# Patient Record
Sex: MALE | Race: WHITE | Employment: OTHER | URBAN - METROPOLITAN AREA
[De-identification: names, ages, dates, MRNs, and addresses within clinical notes are randomized per-mention and may not be internally consistent; named-entity substitution may affect disease eponyms.]

---

## 2017-01-06 ENCOUNTER — OFFICE VISIT (OUTPATIENT)
Dept: INTERNAL MEDICINE CLINIC | Age: 72
End: 2017-01-06

## 2017-01-06 VITALS
OXYGEN SATURATION: 96 % | WEIGHT: 187.8 LBS | HEIGHT: 70 IN | SYSTOLIC BLOOD PRESSURE: 142 MMHG | TEMPERATURE: 97.9 F | HEART RATE: 87 BPM | BODY MASS INDEX: 26.88 KG/M2 | DIASTOLIC BLOOD PRESSURE: 85 MMHG | RESPIRATION RATE: 16 BRPM

## 2017-01-06 DIAGNOSIS — I87.2 VENOUS INSUFFICIENCY OF BOTH LOWER EXTREMITIES: ICD-10-CM

## 2017-01-06 DIAGNOSIS — R79.89 LOW SERUM TESTOSTERONE LEVEL: ICD-10-CM

## 2017-01-06 DIAGNOSIS — N40.1 BENIGN PROSTATIC HYPERPLASIA WITH LOWER URINARY TRACT SYMPTOMS, UNSPECIFIED MORPHOLOGY: ICD-10-CM

## 2017-01-06 DIAGNOSIS — N52.9 IMPOTENCE OF ORGANIC ORIGIN: ICD-10-CM

## 2017-01-06 DIAGNOSIS — M19.90 OSTEOARTHRITIS, UNSPECIFIED OSTEOARTHRITIS TYPE, UNSPECIFIED SITE: ICD-10-CM

## 2017-01-06 DIAGNOSIS — E78.00 PURE HYPERCHOLESTEROLEMIA: ICD-10-CM

## 2017-01-06 DIAGNOSIS — I10 ESSENTIAL HYPERTENSION: Primary | ICD-10-CM

## 2017-01-06 RX ORDER — SIMVASTATIN 40 MG/1
TABLET, FILM COATED ORAL
Qty: 90 TAB | Refills: 3 | Status: SHIPPED | OUTPATIENT
Start: 2017-01-06 | End: 2017-03-04 | Stop reason: SDUPTHER

## 2017-01-06 RX ORDER — LOSARTAN POTASSIUM 50 MG/1
TABLET ORAL
Qty: 90 TAB | Refills: 3 | Status: SHIPPED | OUTPATIENT
Start: 2017-01-06 | End: 2018-01-08 | Stop reason: SDUPTHER

## 2017-01-06 RX ORDER — DICLOFENAC SODIUM 75 MG/1
TABLET, DELAYED RELEASE ORAL
Qty: 90 TAB | Refills: 3 | Status: SHIPPED | OUTPATIENT
Start: 2017-01-06 | End: 2017-03-09 | Stop reason: SDUPTHER

## 2017-01-06 RX ORDER — TOLTERODINE TARTRATE 2 MG/1
2 TABLET, EXTENDED RELEASE ORAL DAILY
COMMUNITY
End: 2022-01-11 | Stop reason: SDUPTHER

## 2017-01-06 RX ORDER — TADALAFIL 5 MG/1
TABLET ORAL
Qty: 90 TAB | Refills: 3 | Status: SHIPPED | OUTPATIENT
Start: 2017-01-06 | End: 2017-03-09 | Stop reason: SDUPTHER

## 2017-01-06 NOTE — PROGRESS NOTES
Mr Marcos Veras returns to [facility] for an annual physical.  He c/o no pain and appears to be in no distress

## 2017-01-06 NOTE — MR AVS SNAPSHOT
Visit Information Date & Time Provider Department Dept. Phone Encounter #  
 1/6/2017 11:15 AM [facility] [phone] [id number] Follow-up Instructions Return in about 6 months (around 7/6/2017). Upcoming Health Maintenance Date Due Hepatitis C Screening 1945 DTaP/Tdap/Td series (1 - Tdap) 12/14/2011 MEDICARE YEARLY EXAM 7/13/2016 INFLUENZA AGE 9 TO ADULT 8/1/2016 GLAUCOMA SCREENING Q2Y 4/8/2018 COLONOSCOPY 2/15/2023 Allergies as of 1/6/2017  Review Complete On: 1/6/2017 By: Jero Factor, DO Severity Noted Reaction Type Reactions Bactrim [Sulfamethoprim Ds] Medium 08/30/2013   Side Effect Hives, Other (comments) Nausea,sweating,cold sweats Pcn [Penicillins] Medium 12/13/2010   Intolerance Swelling Throat and face Lisinopril  12/21/2015    Angioedema Current Immunizations  Reviewed on 8/11/2015 Name Date Influenza Vaccine 10/20/2014 Pneumococcal Conjugate (PCV-13) 8/12/2015 Pneumococcal Vaccine (Unspecified Type) 12/13/2011 TD Vaccine 12/13/2011 Zoster Vaccine, Live 11/10/2014 Not reviewed this visit You Were Diagnosed With   
  
 Codes Comments Essential hypertension    -  Primary ICD-10-CM: I10 
ICD-9-CM: 401.9 Pure hypercholesterolemia     ICD-10-CM: E78.00 ICD-9-CM: 272.0 Osteoarthritis, unspecified osteoarthritis type, unspecified site     ICD-10-CM: M19.90 ICD-9-CM: 715.90 Benign prostatic hyperplasia with lower urinary tract symptoms, unspecified morphology     ICD-10-CM: N40.1 ICD-9-CM: 600.01 Venous insufficiency of both lower extremities     ICD-10-CM: I87.2 ICD-9-CM: 459.81 Low serum testosterone level     ICD-10-CM: E29.1 ICD-9-CM: 257.2 Impotence of organic origin     ICD-10-CM: N52.9 ICD-9-CM: 607.84 Vitals BP Pulse Temp Resp Height(growth percentile) Weight(growth percentile) 142/85 (BP 1 Location: Left arm, BP Patient Position: Sitting) 87 97.9 °F (36.6 °C) (Oral) 16 5' 10\" (1.778 m) 187 lb 12.8 oz (85.2 kg) SpO2 BMI Smoking Status 96% 26.95 kg/m2 Current Some Day Smoker BMI and BSA Data Body Mass Index Body Surface Area  
 26.95 kg/m 2 2.05 m 2 Preferred Pharmacy Pharmacy Name Phone [address] [facility] [phone] Your Updated Medication List  
  
   
This list is accurate as of: 1/6/17 11:44 AM.  Always use your most recent med list.  
  
  
  
  
 aspirin delayed-release 81 mg tablet Take 81 mg by mouth daily. clindamycin 150 mg capsule Commonly known as:  CLEOCIN Take 600 mg by mouth. Take 600 mg (4 capsules) 1 hour prior to dental appointment DETROL 2 mg tablet Generic drug:  tolterodine Take 2 mg by mouth daily. diclofenac EC 75 mg EC tablet Commonly known as:  VOLTAREN  
TAKE 1 TABLET DAILY losartan 50 mg tablet Commonly known as:  COZAAR  
TAKE 1 TABLET DAILY  
  
 M-VIT PO Take 1 Tab by mouth. Takes one multivitamin po daily. Does not have iron. Omega-3-DHA-EPA-Fish Oil 1,000 mg (120 mg-180 mg) Cap Take 1,200 mg by mouth daily. simvastatin 40 mg tablet Commonly known as:  ZOCOR  
TAKE 1 TABLET NIGHTLY  
  
 tadalafil 5 mg tablet Commonly known as:  CIALIS  
TAKE 1 TABLET AS NEEDED  
  
 vardenafil 20 mg tablet Commonly known as:  LEVITRA Take 20 mg by mouth as needed. Prescriptions Sent to Pharmacy Refills  
 losartan (COZAAR) 50 mg tablet 3 Sig: TAKE 1 TABLET DAILY Class: Normal  
 Pharmacy: [address], [address] Ph #: [phone]  
 tadalafil (CIALIS) 5 mg tablet 3 Sig: TAKE 1 TABLET AS NEEDED Class: Normal  
 Pharmacy: [address], [address] Ph #: [phone] diclofenac EC (VOLTAREN) 75 mg EC tablet 3 Sig: TAKE 1 TABLET DAILY Class: Normal  
 Pharmacy: [address], [address] [place] [address] Ph #: [phone]  
 simvastatin (ZOCOR) 40 mg tablet 3 Sig: TAKE 1 TABLET NIGHTLY Class: Normal  
 Pharmacy: [address], [address] Ph #: [phone] We Performed the Following CBC W/O DIFF [52697 CPT(R)] LIPID PANEL [45381 CPT(R)] METABOLIC PANEL, COMPREHENSIVE [31533 CPT(R)] PSA DIAGNOSTIC (PROSTATIC SPECIFIC AG) [id number] CPT(R)] Follow-up Instructions Return in about 6 months (around 7/6/2017). Introducing [facility]! [facility] introduces Q1Media patient portal. Now you can access parts of your medical record, email your doctor's office, and request medication refills online. 1. In your internet browser, go to [email or web address] 2. Click on the First Time User? Click Here link in the Sign In box. You will see the New Member Sign Up page. 3. Enter your Q1Media Access Code exactly as it appears below. You will not need to use this code after youve completed the sign-up process. If you do not sign up before the expiration date, you must request a new code. · Q1Media Access Code: [email or web address] Expires: 4/6/2017 11:34 AM 
 
4. Enter the last four digits of your Social Security Number (xxxx) and Date of Birth (mm/dd/yyyy) as indicated and click Submit. You will be taken to the next sign-up page. 5. Create a Calxedat ID. This will be your Q1Media login ID and cannot be changed, so think of one that is secure and easy to remember. 6. Create a Calxedat password. You can change your password at any time. 7. Enter your Password Reset Question and Answer. This can be used at a later time if you forget your password. 8. Enter your e-mail address.  You will receive e-mail notification when new information is available in Navera. 9. Click Sign Up. You can now view and download portions of your medical record. 10. Click the Download Summary menu link to download a portable copy of your medical information. If you have questions, please visit the Frequently Asked Questions section of the Navera website. Remember, Navera is NOT to be used for urgent needs. For medical emergencies, dial 911. Now available from your iPhone and Android! Please provide this summary of care documentation to your next provider. Your primary care clinician is listed as Margarita York. If you have any questions after today's visit, please call [phone].

## 2017-01-06 NOTE — PROGRESS NOTES
HISTORY OF PRESENT ILLNESS  Lg Martell is a 70 y.o. male. Pt. comes in for f/u. Has multiple medical problems. Overall has been doing well. Chronic issues with BPH. Urologist added Detrol at [facility] and it is helping. Insurance may not cover Cialis although it has helped. Levitra is not helping his ED much. Testosterone was low but expensive. has chronic arthritic issues in knees but stable. Vision is stable. Followed by eye MD with h/o retinal detachment. Reports compliance with medications and diet. Med list and most recent labs/studies reviewed with pt. All look stable. Active physically to control weight. Due for repeat labs. Needs refills. Preventive care is up to date. Reports no other new c/o. Follow Up Chronic Condition   Pertinent negatives include no chest pain, no abdominal pain, no headaches and no shortness of breath. Hypertension    Pertinent negatives include no chest pain, no blurred vision, no headaches, no dizziness and no shortness of breath. Review of Systems   Constitutional: Negative. Eyes: Negative for blurred vision. Respiratory: Negative for shortness of breath. Cardiovascular: Positive for leg swelling. Negative for chest pain. Gastrointestinal: Negative for abdominal pain. Genitourinary: Positive for urgency. Negative for dysuria. Musculoskeletal: Positive for joint pain. Negative for falls. Skin: Negative. Neurological: Negative for dizziness, sensory change and headaches. Endo/Heme/Allergies: Negative. Psychiatric/Behavioral: Negative. All other systems reviewed and are negative. Physical Exam   Constitutional: He is oriented to person, place, and time. He appears well-developed and well-nourished. No distress. pleasant   HENT:   Head: Normocephalic and atraumatic. Mouth/Throat: Oropharynx is clear and moist.   Eyes: Conjunctivae are normal. No scleral icterus. Neck: Normal range of motion. Neck supple. No JVD present.  No thyromegaly present. Cardiovascular: Normal rate, regular rhythm, normal heart sounds and intact distal pulses. No murmur heard. Pulmonary/Chest: Effort normal and breath sounds normal. No respiratory distress. He has no wheezes. He has no rales. Abdominal: Soft. Bowel sounds are normal. He exhibits no distension. There is no tenderness. Musculoskeletal: He exhibits edema (L pedal with skin changes,chronic, stable). He exhibits no tenderness. djd  L knee surgical scar   Neurological: He is alert and oriented to person, place, and time. No cranial nerve deficit. Coordination normal.   Skin: Skin is warm and dry. No rash noted. Psychiatric: He has a normal mood and affect. His behavior is normal.   Nursing note and vitals reviewed. ASSESSMENT and PLAN  Susi Tavares was seen today for follow up chronic condition and hypertension. Diagnoses and all orders for this visit:    Essential hypertension  -     CBC W/O DIFF  -     LIPID PANEL  -     METABOLIC PANEL, COMPREHENSIVE    Pure hypercholesterolemia    Osteoarthritis, unspecified osteoarthritis type, unspecified site    Benign prostatic hyperplasia with lower urinary tract symptoms, unspecified morphology  -     PSA - PROSTATE SPECIFIC AG    Venous insufficiency of both lower extremities    Low serum testosterone level  -     tadalafil (CIALIS) 5 mg tablet; TAKE 1 TABLET AS NEEDED    Impotence of organic origin  -     tadalafil (CIALIS) 5 mg tablet; TAKE 1 TABLET AS NEEDED    Other orders  -     losartan (COZAAR) 50 mg tablet; TAKE 1 TABLET DAILY  -     diclofenac EC (VOLTAREN) 75 mg EC tablet; TAKE 1 TABLET DAILY  -     simvastatin (ZOCOR) 40 mg tablet; TAKE 1 TABLET NIGHTLY      Follow-up Disposition:  Return in about 6 months (around 7/6/2017).   lab results and schedule of future lab studies reviewed with patient  reviewed diet, exercise and weight control  cardiovascular risk and specific lipid/LDL goals reviewed  reviewed medications and side effects in detail  use of aspirin to prevent MI and TIA's discussed  Overall stable  F/u with other MD's as scheduled

## 2017-01-06 NOTE — LETTER
1/11/2017 9:10 AM 
 
Mr. Nathaly Figueroa [address] [phone] Dear Nathaly Figueroa: 
 
Please find your most recent results below. Resulted Orders PSA DIAGNOSTIC (PROSTATIC SPECIFIC AG) Result Value Ref Range Prostate Specific Ag 1.3 0.0 - 4.0 ng/mL Comment:  
   Roche ECLIA methodology. According to the American Urological Association, Serum PSA should 
decrease and remain at undetectable levels after radical 
prostatectomy. The AUA defines biochemical recurrence as an initial 
PSA value 0.2 ng/mL or greater followed by a subsequent confirmatory PSA value 0.2 ng/mL or greater. Values obtained with different assay methods or kits cannot be used 
interchangeably. Results cannot be interpreted as absolute evidence 
of the presence or absence of malignant disease. Narrative Performed at:  [address]  [id number] : Nicolás Acevedo MD, Phone:  [phone] CBC W/O DIFF Result Value Ref Range WBC 7.4 3.4 - 10.8 x10E3/uL  
 RBC 4.86 4.14 - 5.80 x10E6/uL HGB 14.7 12.6 - 17.7 g/dL HCT 44.0 37.5 - 51.0 % MCV 91 79 - 97 fL  
 MCH 30.2 26.6 - 33.0 pg  
 MCHC 33.4 31.5 - 35.7 g/dL  
 RDW 14.2 12.3 - 15.4 % PLATELET 049 314 - 028 x10E3/uL Narrative Performed at:  [address]  [id number] : Nicolás Acevedo MD, Phone:  [phone] LIPID PANEL Result Value Ref Range Cholesterol, total 174 100 - 199 mg/dL Triglyceride 91 0 - 149 mg/dL HDL Cholesterol 62 >39 mg/dL VLDL, calculated 18 5 - 40 mg/dL LDL, calculated 94 0 - 99 mg/dL Narrative Performed at:  [address]  [id number] : Nicolás Acevedo MD, Phone:  [phone] METABOLIC PANEL, COMPREHENSIVE Result Value Ref Range Glucose 101 (H) 65 - 99 mg/dL BUN 17 8 - 27 mg/dL Creatinine 1.03 0.76 - 1.27 mg/dL GFR est non-AA 73 >59 mL/min/1.73 GFR est AA 84 >59 mL/min/1.73  
 BUN/Creatinine ratio 17 10 - 22 Sodium 144 134 - 144 mmol/L Potassium 4.9 3.5 - 5.2 mmol/L Chloride 104 96 - 106 mmol/L  
 CO2 24 18 - 29 mmol/L Calcium 10.6 (H) 8.6 - 10.2 mg/dL Protein, total 7.1 6.0 - 8.5 g/dL Albumin 4.3 3.5 - 4.8 g/dL GLOBULIN, TOTAL 2.8 1.5 - 4.5 g/dL A-G Ratio 1.5 1.1 - 2.5 Bilirubin, total 0.6 0.0 - 1.2 mg/dL Alk. phosphatase 79 39 - 117 IU/L  
 AST 24 0 - 40 IU/L  
 ALT 24 0 - 44 IU/L Narrative Performed at:  [address]  [id number] : Natividad Soriano MD, Phone:  [phone] CVD REPORT Result Value Ref Range INTERPRETATION Note Comment:  
   Supplement report is available. Narrative Performed at:  [address] [place] [address]  [id number] : Jose Orlando PhD, Phone:  [phone] RECOMMENDATIONS: 
 
Mildly high calcium otherwise stable Stop calcium supplements Increase fluid intake Please call me if you have any questions: [phone] Sincerely, 
 
 
Ryder Blackmon, DO

## 2017-01-07 LAB
ALBUMIN SERPL-MCNC: 4.3 G/DL (ref 3.5–4.8)
ALBUMIN/GLOB SERPL: 1.5 {RATIO} (ref 1.1–2.5)
ALP SERPL-CCNC: 79 IU/L (ref 39–117)
ALT SERPL-CCNC: 24 IU/L (ref 0–44)
AST SERPL-CCNC: 24 IU/L (ref 0–40)
BILIRUB SERPL-MCNC: 0.6 MG/DL (ref 0–1.2)
BUN SERPL-MCNC: 17 MG/DL (ref 8–27)
BUN/CREAT SERPL: 17 (ref 10–22)
CALCIUM SERPL-MCNC: 10.6 MG/DL (ref 8.6–10.2)
CHLORIDE SERPL-SCNC: 104 MMOL/L (ref 96–106)
CHOLEST SERPL-MCNC: 174 MG/DL (ref 100–199)
CO2 SERPL-SCNC: 24 MMOL/L (ref 18–29)
CREAT SERPL-MCNC: 1.03 MG/DL (ref 0.76–1.27)
ERYTHROCYTE [DISTWIDTH] IN BLOOD BY AUTOMATED COUNT: 14.2 % (ref 12.3–15.4)
GLOBULIN SER CALC-MCNC: 2.8 G/DL (ref 1.5–4.5)
GLUCOSE SERPL-MCNC: 101 MG/DL (ref 65–99)
HCT VFR BLD AUTO: 44 % (ref 37.5–51)
HDLC SERPL-MCNC: 62 MG/DL
HGB BLD-MCNC: 14.7 G/DL (ref 12.6–17.7)
INTERPRETATION, 910389: NORMAL
LDLC SERPL CALC-MCNC: 94 MG/DL (ref 0–99)
MCH RBC QN AUTO: 30.2 PG (ref 26.6–33)
MCHC RBC AUTO-ENTMCNC: 33.4 G/DL (ref 31.5–35.7)
MCV RBC AUTO: 91 FL (ref 79–97)
PLATELET # BLD AUTO: 236 X10E3/UL (ref 150–379)
POTASSIUM SERPL-SCNC: 4.9 MMOL/L (ref 3.5–5.2)
PROT SERPL-MCNC: 7.1 G/DL (ref 6–8.5)
PSA SERPL-MCNC: 1.3 NG/ML (ref 0–4)
RBC # BLD AUTO: 4.86 X10E6/UL (ref 4.14–5.8)
SODIUM SERPL-SCNC: 144 MMOL/L (ref 134–144)
TRIGL SERPL-MCNC: 91 MG/DL (ref 0–149)
VLDLC SERPL CALC-MCNC: 18 MG/DL (ref 5–40)
WBC # BLD AUTO: 7.4 X10E3/UL (ref 3.4–10.8)

## 2017-07-07 ENCOUNTER — HOSPITAL ENCOUNTER (OUTPATIENT)
Dept: LAB | Age: 72
Discharge: HOME | End: 2017-07-07
Payer: MEDICARE

## 2017-07-07 ENCOUNTER — OFFICE VISIT (OUTPATIENT)
Dept: INTERNAL MEDICINE CLINIC | Age: 72
End: 2017-07-07

## 2017-07-07 VITALS
HEIGHT: 70 IN | TEMPERATURE: 96.5 F | RESPIRATION RATE: 20 BRPM | BODY MASS INDEX: 26.63 KG/M2 | SYSTOLIC BLOOD PRESSURE: 153 MMHG | HEART RATE: 73 BPM | DIASTOLIC BLOOD PRESSURE: 97 MMHG | OXYGEN SATURATION: 98 % | WEIGHT: 186 LBS

## 2017-07-07 DIAGNOSIS — I10 ESSENTIAL HYPERTENSION: Primary | ICD-10-CM

## 2017-07-07 DIAGNOSIS — Z00.00 MEDICARE ANNUAL WELLNESS VISIT, INITIAL: ICD-10-CM

## 2017-07-07 DIAGNOSIS — L98.9 NON-HEALING SKIN LESION: ICD-10-CM

## 2017-07-07 DIAGNOSIS — E78.00 PURE HYPERCHOLESTEROLEMIA: ICD-10-CM

## 2017-07-07 DIAGNOSIS — N40.1 BENIGN PROSTATIC HYPERPLASIA WITH LOWER URINARY TRACT SYMPTOMS, UNSPECIFIED MORPHOLOGY: ICD-10-CM

## 2017-07-07 DIAGNOSIS — L21.9 SEBORRHEIC DERMATITIS: ICD-10-CM

## 2017-07-07 DIAGNOSIS — I87.2 VENOUS INSUFFICIENCY OF BOTH LOWER EXTREMITIES: ICD-10-CM

## 2017-07-07 DIAGNOSIS — E34.9 HYPOTESTOSTERONISM: ICD-10-CM

## 2017-07-07 PROCEDURE — 80061 LIPID PANEL: CPT

## 2017-07-07 PROCEDURE — 84403 ASSAY OF TOTAL TESTOSTERONE: CPT

## 2017-07-07 PROCEDURE — 84460 ALANINE AMINO (ALT) (SGPT): CPT

## 2017-07-07 PROCEDURE — 36415 COLL VENOUS BLD VENIPUNCTURE: CPT

## 2017-07-07 PROCEDURE — 84450 TRANSFERASE (AST) (SGOT): CPT

## 2017-07-07 PROCEDURE — 80048 BASIC METABOLIC PNL TOTAL CA: CPT

## 2017-07-07 RX ORDER — KETOCONAZOLE 20 MG/G
CREAM TOPICAL
Qty: 30 G | Refills: 2 | Status: SHIPPED | OUTPATIENT
Start: 2017-07-07 | End: 2019-01-11 | Stop reason: ALTCHOICE

## 2017-07-07 NOTE — PATIENT INSTRUCTIONS
Medicare Part B Preventive Services Guidelines/Limitations Date last completed and Frequency Due Date   Bone Mass Measurement  (age 72 & older, biennial) Requires diagnosis related to osteoporosis or estrogen deficiency. Biennial benefit unless patient has history of long-term glucocorticoid tx or baseline is needed because initial test was by other method, or for patients with vertebral abnormalities on x-ray Not Complete:       Recommended every 2 years Due: NOW      As recommended by your PCP or Specialist     Cardiovascular Screening Blood Tests (every 5 years)  Total cholesterol, HDL, Triglycerides Order as a panel if possible Completed:   1-6-17    As recommended by your PCP Scheduled to have labs done today 7-7-17      As recommended by your PCP or Specialist   Hepatitis B vaccines  (covered for patients at high risk- hemophilia, ESRD, DM, body fluid contact   Three shot series covered once     May have been done in the 70's N/A   Zoster Shingles vaccine Covered by Medicare Part D through the pharmacy- PCP provides prescription Completed:   11-10-14    Recommended once over age 48  This is a one time vaccine    Pneumococcal vaccine (once after age 72)      __________________  Anmol Carlos 15           ___________________ Completed:   12-13-11    Recommended once over the age of 72  __________________  Completed:  8-12-15    Recommended once over the age of 72 This is a one time vaccine        ________________    This is a one time vaccine       Colorectal Cancer Screening  -Fecal occult blood test (annual)  -Flexible sigmoidoscopy (5y)  -Screening colonoscopy (10y)  -Barium Enema Age 49-80;  After age [de-identified] if history of abnormal results Completed:    2-15-13    Recommended every 5 to 10 years  Due: 2023      As recommended by your PCP or Specialist     Counseling to Prevent Tobacco Use (up to 8 sessions per year)  - Counseling greater than 3 and up to 10 minutes  - Counseling greater than 10 minutes   Patients must be asymptomatic of tobacco-related conditions to receive as preventive service N/A N/A   Diabetes Screening Tests (at least every 3 years, Medicare covers annually or at 6-month intervals for prediabetic patients)    Fasting blood sugar (FBS) or glucose tolerance test (GTT) Patient must be diagnosed with one of the following:  -Hypertension, Dyslipidemia, obesity, previous impaired FBS or GTT  Or any two of the following: overweight, FH of diabetes, age ? 72, history of gestational diabetes, birth of baby weighing more than 9 pounds Not diabetic      Recommended every 3 years for non-diabetics      Recommended every 3-6 months for Pre-Diabetics and Diabetics       As recommended by your PCP or Specialist     Lung Cancer Screening-with low dose CT for patients who meet all criteria:  -Age 50-69  -either a current smoker or have quit in the past 15 yrs  -have a smoking history of 30 pack years or more  -have a written order from MD for screening   Covered once every 12 months  N/A N/A   Glaucoma Screening (no USPSTF recommendation) Covered for high risk patients such as patients with Diabetes mellitus, family history of glaucoma, , age 48 or over,  American, age 72 or over Completed:   4-8-16  Recommended annually Due: NOW  Please schedule for follow up   Seasonal Influenza Vaccination (annually)  Completed:  11-11-16     Recommended Annually Due: Fall of 2017   TDAP Vaccination Covered by Medicare part D through the pharmacy- PCP provides prescription Completed:   at [facility] pharmacy     Recommended every 10 years Due:   Records to be requested   HIV Screening (includes patients at high risk and includes any patient that requests the test and pregnant women   Covered once every 12 months or up to 3 times during pregnancy N/A N/A   Hepatitis C screening tests Indicated for patients with at least one of the following: current or past history of IV drug use, those who had blood transfusion before 12, those born between [facility]. Decline   Declined     Please contact your RN/Nurse Navigator with any questions about your visit or instructions today. Thank you for the opportunity for us to participate in your care.

## 2017-07-07 NOTE — LETTER
7/11/2017 11:18 AM 
 
Mr. Hayley Cantrell [address] [phone] Dear Hayley Cantrell: 
 
Please find your most recent results below. Resulted Orders ALT Result Value Ref Range ALT (SGPT) 34 0 - 44 IU/L Narrative Performed at:  [address]  [id number] : Mckenna Fuller MD, Phone:  [phone] AST Result Value Ref Range AST (SGOT) 26 0 - 40 IU/L Narrative Performed at:  [address]  [id number] : Mckenna Fuller MD, Phone:  [phone] LIPID PANEL Result Value Ref Range Cholesterol, total 168 100 - 199 mg/dL Triglyceride 92 0 - 149 mg/dL HDL Cholesterol 61 >39 mg/dL VLDL, calculated 18 5 - 40 mg/dL LDL, calculated 89 0 - 99 mg/dL Narrative Performed at:  [address]  [id number] : Mckenna Fuller MD, Phone:  [phone] METABOLIC PANEL, BASIC Result Value Ref Range Glucose 103 (H) 65 - 99 mg/dL BUN 22 8 - 27 mg/dL Creatinine 0.84 0.76 - 1.27 mg/dL GFR est non-AA 88 >59 mL/min/1.73 GFR est  >59 mL/min/1.73  
 BUN/Creatinine ratio 26 (H) 10 - 24 Sodium 143 134 - 144 mmol/L Potassium 4.6 3.5 - 5.2 mmol/L Chloride 102 96 - 106 mmol/L  
 CO2 26 18 - 29 mmol/L Calcium 10.3 (H) 8.6 - 10.2 mg/dL Narrative Performed at:  [address]  [id number] : Mckenna Fuller MD, Phone:  [phone] TESTOSTERONE, FREE & TOTAL Result Value Ref Range Testosterone 396 348 - 1197 ng/dL Comment: **Effective July 17, 2017 the reference interval for** Testosterone, Serum Males >22 years old will be 
  changing to: Adult Males:      [phone] COMMENT Comment Comment:  
   Adult male reference interval is based on a population of lean males up to 36years old. Free testosterone (Direct) 7.8 6.6 - 18.1 pg/mL Narrative Performed at:  [address]  [id number] : Fabi Barrios MD, Phone:  [phone] CVD REPORT Result Value Ref Range INTERPRETATION Note Comment:  
   Supplement report is available. Narrative Performed at:  [address] [place] [address]  [id number] : Missy Cordoba PhD, Phone:  [phone] RECOMMENDATIONS: 
All labs are stable Please call me if you have any questions: [phone] Sincerely, 
 
 
Abigail Lunch, DO

## 2017-07-07 NOTE — PROGRESS NOTES
Medicare Wellness Visit      Boyd Mackenzie is a 70 y.o. male and presents for Annual Medicare Wellness Visit. Assessment of cognitive impairment: Alert and oriented x 3. Depression Screen:   PHQ over the last two weeks 7/7/2017   Little interest or pleasure in doing things Not at all   Feeling down, depressed or hopeless Not at all   Total Score PHQ 2 0       Fall Risk Assessment:    Fall Risk Assessment, last 12 mths 7/7/2017   Able to walk? Yes   Fall in past 12 months? No       Abuse Screen:   Abuse Screening Questionnaire 7/7/2017   Do you ever feel afraid of your partner? N   Are you in a relationship with someone who physically or mentally threatens you? N   Is it safe for you to go home? Y       Activities of Daily Living:  Self-care. Requires assistance with: no ADLs  Patient handle his/her own medications  yes Use of pill box  no  Activities of Daily Living:   ADL Assessment 7/7/2017   Feeding yourself No Help Needed   Getting from bed to chair No Help Needed   Getting dressed No Help Needed   Bathing or showering No Help Needed   Walk across the room (includes cane/walker) No Help Needed   Using the telphone No Help Needed   Taking your medications No Help Needed   Preparing meals No Help Needed   Managing money (expenses/bills) No Help Needed   Moderately strenuous housework (laundry) No Help Needed   Shopping for personal items (toiletries/medicines) No Help Needed   Shopping for groceries No Help Needed   Driving No Help Needed   Climbing a flight of stairs No Help Needed   Getting to places beyond walking distances No Help Needed       Health Maintenance:  Daily Aspirin: yes   Bone Density: has not had this but will consider for future. Information provided  Glaucoma Screening: Patient has yearly exams. Last one on 4-8-16. Information provided r/t keeping follow up appointments. Patient will call to schedule  Immunizations:    Tetanus: up to date per patient report.  [facility]  Influenza: up to date. Shingles: up to date. PPSV-23: up to date. Prevnar-13: up to date. Cancer screening:    Colon: up to date. Prostate:  up to date    Alcohol Risk Screen:   On any occasion during the past 3 months, have you had more than 3 drinks(female) or 4 drinks (male) containing alcohol in one? No  Do you average more than 7 drinks (female) or 14 drinks (male) per week? No  Type and amount: drinks occasional glass of wine    Hearing Loss:  denies any hearing loss, wears hearing aides    Vision Loss:   Wears glasses, contact lenses, or have any other visual impairment  no    Adult Nutrition Screen:  No risk factors noted. Advance Care Planning:   End of Life Planning: has an advanced directive - a copy has been provided, has NO advanced directive  - add't info provided, reviewed DNR/DNI and patient is not interested      Medications/Allergies: Reviewed with patient  Prior to Admission medications    Medication Sig Start Date End Date Taking? Authorizing Provider   simvastatin (ZOCOR) 40 mg tablet TAKE 1 TABLET NIGHTLY 3/9/17  Yes Jaron Haney DO   CIALIS 5 mg tablet TAKE 1 TABLET AS NEEDED 3/9/17  Yes Jaron Haney DO   diclofenac EC (VOLTAREN) 75 mg EC tablet TAKE 1 TABLET DAILY 3/9/17  Yes Jaron Haney DO   tolterodine (DETROL) 2 mg tablet Take 2 mg by mouth daily. Yes Historical Provider   losartan (COZAAR) 50 mg tablet TAKE 1 TABLET DAILY 1/6/17  Yes Jaron Haney DO   aspirin delayed-release 81 mg tablet Take 81 mg by mouth daily. Yes Historical Provider   Omega-3-DHA-EPA-Fish Oil 1,000 (120-180) mg Cap Take 1,200 mg by mouth daily. Yes Historical Provider   PV W-O MILLICENT/FERROUS FUMARATE/FA (M-VIT PO) Take 1 Tab by mouth. Takes one multivitamin po daily. Does not have iron. Yes Historical Provider   vardenafil (LEVITRA) 20 mg tablet Take 20 mg by mouth as needed.  7/17/15   Peyton Owen, DO       Allergies   Allergen Reactions    Bactrim [Sulfamethoprim Ds] Hives and Other (comments)     Nausea,sweating,cold sweats    Pcn [Penicillins] Swelling     Throat and face    Lisinopril Angioedema       Objective:  Visit Vitals    BP (!) 153/97 (BP 1 Location: Right arm, BP Patient Position: Sitting)    Pulse 73    Temp 96.5 °F (35.8 °C) (Oral)    Resp 20    Ht 5' 10\" (1.778 m)    Wt 186 lb (84.4 kg)    SpO2 98%    BMI 26.69 kg/m2    Body mass index is 26.69 kg/(m^2). Problem List: Reviewed with patient and discussed risk factors. 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 3XXA    Seborrheic dermatitis L21.9       PSH: Reviewed with patient  Past Surgical History:   Procedure Laterality Date    HX CATARACT REMOVAL      bilateral then bilateral laser surgery to open back of eye to let more light in    HX HERNIA REPAIR      inguinal hernia repair    HX ORTHOPAEDIC      8 left knee surgeries - arthroscopies/ACL repair/knee replacement    HX RETINAL DETACHMENT REPAIR      2 right eye/1 left eye to repair retina and macula    HX TONSILLECTOMY      HX VASECTOMY          SH: Reviewed with patient  Social History   Substance Use Topics    Smoking status: Current Some Day Smoker     Types: Cigars    Smokeless tobacco: Never Used      Comment: occasional cigar    Alcohol use 0.5 oz/week     1 Glasses of wine per week       FH: Reviewed with patient  Family History   Problem Relation Age of Onset    Lung Disease Mother        Current medical providers:    Patient Care Team:  Sarwat Dunlap DO as PCP - Yasmin Benitse MD (Ophthalmology)  Yaz Clement MD (Gastroenterology)  Julienne Bamberger, RN as Ambulatory Care Navigator (Internal Medicine)    Plan:      Orders Placed This Encounter    ALT    AST    LIPID PANEL    METABOLIC PANEL, BASIC    TESTOSTERONE, FREE & TOTAL    [facility] Maintenance   Topic Date Due    Hepatitis C Screening  1945    DTaP/Tdap/Td series (1 - Tdap) 12/14/2011    INFLUENZA AGE 9 TO ADULT  08/01/2017    GLAUCOMA SCREENING Q2Y  04/08/2018    MEDICARE YEARLY EXAM  07/08/2018    COLONOSCOPY  02/15/2023    ZOSTER VACCINE AGE 60>  Completed    Pneumococcal 65+ Low/Medium Risk  Completed         Urinary/ Fecal Incontinence: No    Regular physical exercise: Yes patient plays golf regularly    Patient verbalized understanding of information presented. AVS and Medicare Part B Preventive Services Table printed, given to pt and reviewed. See table for findings under Recommendation and Scheduled. All of the patient's questions were answered.

## 2017-07-07 NOTE — MR AVS SNAPSHOT
Visit Information Date & Time Provider Department Dept. Phone Encounter #  
 7/7/2017 10:15 AM Elsa Maya [facility] [phone] [id number] Follow-up Instructions Return in about 6 months (around 1/7/2018). Upcoming Health Maintenance Date Due Hepatitis C Screening 1945 DTaP/Tdap/Td series (1 - Tdap) 12/14/2011 MEDICARE YEARLY EXAM 7/13/2016 INFLUENZA AGE 9 TO ADULT 8/1/2017 GLAUCOMA SCREENING Q2Y 4/8/2018 COLONOSCOPY 2/15/2023 Allergies as of 7/7/2017  Review Complete On: 7/7/2017 By: Jordan Jovel,  Severity Noted Reaction Type Reactions Bactrim [Sulfamethoprim Ds] Medium 08/30/2013   Side Effect Hives, Other (comments) Nausea,sweating,cold sweats Pcn [Penicillins] Medium 12/13/2010   Intolerance Swelling Throat and face Lisinopril  12/21/2015    Angioedema Current Immunizations  Reviewed on 8/11/2015 Name Date Influenza Vaccine 10/20/2014 Pneumococcal Conjugate (PCV-13) 8/12/2015 Pneumococcal Vaccine (Unspecified Type) 12/13/2011 TD Vaccine 12/13/2011 Zoster Vaccine, Live 11/10/2014 Not reviewed this visit You Were Diagnosed With   
  
 Codes Comments Essential hypertension    -  Primary ICD-10-CM: I10 
ICD-9-CM: 401.9 Pure hypercholesterolemia     ICD-10-CM: E78.00 ICD-9-CM: 272.0 Venous insufficiency of both lower extremities     ICD-10-CM: I87.2 ICD-9-CM: 459.81 Benign prostatic hyperplasia with lower urinary tract symptoms, unspecified morphology     ICD-10-CM: N40.1 ICD-9-CM: 600.01 Non-healing skin lesion     ICD-10-CM: L98.9 ICD-9-CM: 709.9 Seborrheic dermatitis     ICD-10-CM: L21.9 ICD-9-CM: 690.10 Hypotestosteronism     ICD-10-CM: E29.1 ICD-9-CM: 257.2 Vitals BP Pulse Temp Resp Height(growth percentile) Weight(growth percentile)  (!) 153/97 (BP 1 Location: Right arm, BP Patient Position: Sitting) 73 96.5 °F (35.8 °C) (Oral) 20 5' 10\" (1.778 m) 186 lb (84.4 kg) SpO2 BMI Smoking Status 98% 26.69 kg/m2 Current Some Day Smoker Vitals History BMI and BSA Data Body Mass Index Body Surface Area  
 26.69 kg/m 2 2.04 m 2 Preferred Pharmacy Pharmacy Name Phone [facility] [phone] Your Updated Medication List  
  
   
This list is accurate as of: 7/7/17 10:27 AM.  Always use your most recent med list.  
  
  
  
  
 aspirin delayed-release 81 mg tablet Take 81 mg by mouth daily. CIALIS 5 mg tablet Generic drug:  tadalafil TAKE 1 TABLET AS NEEDED DETROL 2 mg tablet Generic drug:  tolterodine Take 2 mg by mouth daily. diclofenac EC 75 mg EC tablet Commonly known as:  VOLTAREN  
TAKE 1 TABLET DAILY losartan 50 mg tablet Commonly known as:  COZAAR  
TAKE 1 TABLET DAILY  
  
 M-VIT PO Take 1 Tab by mouth. Takes one multivitamin po daily. Does not have iron. Omega-3-DHA-EPA-Fish Oil 1,000 mg (120 mg-180 mg) Cap Take 1,200 mg by mouth daily. simvastatin 40 mg tablet Commonly known as:  ZOCOR  
TAKE 1 TABLET NIGHTLY  
  
 vardenafil 20 mg tablet Commonly known as:  LEVITRA Take 20 mg by mouth as needed. We Performed the Following ALT [id number] CPT(R)] AST L3215073 CPT(R)] LIPID PANEL [09243 CPT(R)] METABOLIC PANEL, BASIC [99461 CPT(R)] REFERRAL TO DERMATOLOGY [REF19 Custom] Comments:  
 Please evaluate patient for non-healing skin lesion. TESTOSTERONE, FREE & TOTAL [22629 CPT(R)] Follow-up Instructions Return in about 6 months (around 1/7/2018). Referral Information Referral ID Referred By Referred To  
  
 [id number] MD Bella Guevara  [address] [place], [facility] Phone: [phone] Fax: [phone] Visits Status Start Date End Date 1 New Request 7/7/17 7/7/18 If your referral has a status of pending review or denied, additional information will be sent to support the outcome of this decision. Patient Instructions Medicare Part B Preventive Services Guidelines/Limitations Date last completed and Frequency Due Date Bone Mass Measurement 
(age 72 & older, biennial) Requires diagnosis related to osteoporosis or estrogen deficiency. Biennial benefit unless patient has history of long-term glucocorticoid tx or baseline is needed because initial test was by other method, or for patients with vertebral abnormalities on x-ray Not Complete:  
 
 
Recommended every 2 years Due: NOW As recommended by your PCP or Specialist 
  
Cardiovascular Screening Blood Tests (every 5 years) Total cholesterol, HDL, Triglycerides Order as a panel if possible Completed:  
1-6-17 As recommended by your PCP Scheduled to have labs done today 7-7-17 As recommended by your PCP or Specialist  
Hepatitis B vaccines (covered for patients at high risk- hemophilia, ESRD, DM, body fluid contact Three shot series covered once May have been done in the 70's N/A Zoster Shingles vaccine Covered by Medicare Part D through the pharmacy- PCP provides prescription Completed:  
11-10-14 Recommended once over age 48  This is a one time vaccine Pneumococcal vaccine (once after age 72) 
 
 
__________________ Prevnar 13  
 
 
 
 
___________________ Completed:  
30-44-33 Recommended once over the age of 72 
__________________ Completed: 
8-12-15 Recommended once over the age of 72 This is a one time vaccine 
 
 
 
________________ This is a one time vaccine Colorectal Cancer Screening 
-Fecal occult blood test (annual) -Flexible sigmoidoscopy (5y) 
-Screening colonoscopy (10y) -Barium Enema Age 49-80; After age [de-identified] if history of abnormal results Completed:  
 2-15-13 Recommended every 5 to 10 years  Due: 2023 As recommended by your PCP or Specialist 
  
Counseling to Prevent Tobacco Use (up to 8 sessions per year) - Counseling greater than 3 and up to 10 minutes - Counseling greater than 10 minutes Patients must be asymptomatic of tobacco-related conditions to receive as preventive service N/A N/A Diabetes Screening Tests (at least every 3 years, Medicare covers annually or at 6-month intervals for prediabetic patients) Fasting blood sugar (FBS) or glucose tolerance test (GTT) Patient must be diagnosed with one of the following: 
-Hypertension, Dyslipidemia, obesity, previous impaired FBS or GTT 
Or any two of the following: overweight, FH of diabetes, age ? 72, history of gestational diabetes, birth of baby weighing more than 9 pounds Not diabetic Recommended every 3 years for non-diabetics Recommended every 3-6 months for Pre-Diabetics and Diabetics As recommended by your PCP or Specialist 
  
Lung Cancer Screening-with low dose CT for patients who meet all criteria: 
-Age 50-69 
-either a current smoker or have quit in the past 15 yrs 
-have a smoking history of 30 pack years or more 
-have a written order from MD for screening Covered once every 12 months  N/A N/A Glaucoma Screening (no USPSTF recommendation) Covered for high risk patients such as patients with Diabetes mellitus, family history of glaucoma, , age 48 or over,  American, age 72 or over Completed:  
4-8-16 Recommended annually Due: NOW Please schedule for follow up Seasonal Influenza Vaccination (annually)  Completed: 
11-11-16 Recommended Annually Due: Fall of 2017 TDAP Vaccination Covered by Medicare part D through the pharmacy- PCP provides prescription Completed: 
 at [facility] pharmacy Recommended every 10 years Due:  
Records to be requested HIV Screening (includes patients at high risk and includes any patient that requests the test and pregnant women Covered once every 12 months or up to 3 times during pregnancy N/A N/A Hepatitis C screening tests Indicated for patients with at least one of the following: current or past history of IV drug use, those who had blood transfusion before 1992, those born between [facility]. Decline Declined Please contact your RN/Nurse Navigator with any questions about your visit or instructions today. Thank you for the opportunity for us to participate in your care. Introducing [facility]! [employer] introduces [facility] patient portal. Now you can access parts of your medical record, email your doctor's office, and request medication refills online. 1. In your internet browser, go to [email or web address] 2. Click on the First Time User? Click Here link in the Sign In box. You will see the New Member Sign Up page. 3. Enter your [facility] Access Code exactly as it appears below. You will not need to use this code after youve completed the sign-up process. If you do not sign up before the expiration date, you must request a new code. · [facility] Access Code: [email or web address] Expires: 10/5/2017 10:03 AM 
 
4. Enter the last four digits of your Social Security Number (xxxx) and Date of Birth (mm/dd/yyyy) as indicated and click Submit. You will be taken to the next sign-up page. 5. Create a [facility] ID. This will be your [facility] login ID and cannot be changed, so think of one that is secure and easy to remember. 6. Create a [facility] password. You can change your password at any time. 7. Enter your Password Reset Question and Answer. This can be used at a later time if you forget your password. 8. Enter your e-mail address. You will receive e-mail notification when new information is available in [address] 9. Click Sign Up. You can now view and download portions of your medical record. 10. Click the Download Summary menu link to download a portable copy of your medical information. If you have questions, please visit the Frequently Asked Questions section of the [employer] website. Remember, [employer] is NOT to be used for urgent needs. For medical emergencies, dial 911. Now available from your iPhone and Android! Please provide this summary of care documentation to your next provider. Your primary care clinician is listed as Koko Tracey. If you have any questions after today's visit, please call [phone].

## 2017-07-07 NOTE — PROGRESS NOTES
HISTORY OF PRESENT ILLNESS  Rob Pereyra is a 70 y.o. male. Pt. comes in for f/u. Has multiple medical problems. Has chronic issues with BPH and ED. Followed by urologist. Ellie Chapa on Detroland Cialis. Testosterone was low but expensive. He has chronic arthritic issues in knees but stable. Vision is stable. Followed by eye MD with h/o retinal detachment. Reports a spot on his forehead that is not healing. Also having redness and flaking on his face. He is out in the sun but using sunscreen and wearing a hat. Reports compliance with medications and diet. Med list and most recent labs/studies reviewed with pt. All look stable. Active physically to control weight. Due for repeat labs. Preventive care is up to date. Reports no other new c/o. Hypertension    Pertinent negatives include no chest pain, no blurred vision, no headaches, no dizziness and no shortness of breath. Cholesterol Problem   Pertinent negatives include no chest pain, no abdominal pain, no headaches and no shortness of breath. Review of Systems   Constitutional: Negative. Eyes: Negative for blurred vision. Respiratory: Negative for shortness of breath. Cardiovascular: Positive for leg swelling. Negative for chest pain. Gastrointestinal: Negative for abdominal pain. Genitourinary: Positive for urgency. Negative for dysuria. ED   Musculoskeletal: Positive for joint pain. Negative for falls. Skin: Negative. Neurological: Negative for dizziness, sensory change and headaches. Endo/Heme/Allergies: Negative. Psychiatric/Behavioral: Negative. All other systems reviewed and are negative. Physical Exam   Constitutional: He is oriented to person, place, and time. He appears well-developed and well-nourished. No distress. pleasant   HENT:   Head: Normocephalic and atraumatic. Mouth/Throat: Oropharynx is clear and moist.   Eyes: Conjunctivae are normal. No scleral icterus. Neck: Normal range of motion. Neck supple. No JVD present. No thyromegaly present. Cardiovascular: Normal rate, regular rhythm, normal heart sounds and intact distal pulses. No murmur heard. Pulmonary/Chest: Effort normal and breath sounds normal. No respiratory distress. He has no wheezes. He has no rales. Abdominal: Soft. Bowel sounds are normal. He exhibits no distension. There is no tenderness. Musculoskeletal: He exhibits edema (L pedal with skin changes,chronic, stable). He exhibits no tenderness. djd  L knee surgical scar   Neurological: He is alert and oriented to person, place, and time. No cranial nerve deficit. Coordination normal.   Skin: Skin is warm and dry. No rash noted. Psychiatric: He has a normal mood and affect. His behavior is normal.   Nursing note and vitals reviewed. ASSESSMENT and PLAN  Joan Nobles was seen today for hypertension, cholesterol problem, benign prostatic hypertrophy and annual wellness visit. Diagnoses and all orders for this visit:    Essential hypertension  -     METABOLIC PANEL, BASIC    Pure hypercholesterolemia  -     ALT  -     AST  -     LIPID PANEL  -     METABOLIC PANEL, BASIC    Venous insufficiency of both lower extremities    Benign prostatic hyperplasia with lower urinary tract symptoms, unspecified morphology    Non-healing skin lesion  -     REFERRAL TO DERMATOLOGY    Seborrheic dermatitis    Hypotestosteronism  -     TESTOSTERONE, FREE & TOTAL    Medicare annual wellness visit, initial    Other orders  -     ketoconazole (NIZORAL) 2 % topical cream; Apply to affected areas on face daily      Follow-up Disposition:  Return in about 6 months (around 1/7/2018).    lab results and schedule of future lab studies reviewed with patient  reviewed diet, exercise and weight control  reviewed medications and side effects in detail  Overall stable  F/u with other MD's as scheduled  I have reviewed and agree with Medicare Annual Wellness visit done by Addie Garcias

## 2017-07-09 LAB
ALT SERPL-CCNC: 34 IU/L (ref 0–44)
AST SERPL-CCNC: 26 IU/L (ref 0–40)
BUN SERPL-MCNC: 22 MG/DL (ref 8–27)
BUN/CREAT SERPL: 26 (ref 10–24)
CALCIUM SERPL-MCNC: 10.3 MG/DL (ref 8.6–10.2)
CHLORIDE SERPL-SCNC: 102 MMOL/L (ref 96–106)
CHOLEST SERPL-MCNC: 168 MG/DL (ref 100–199)
CO2 SERPL-SCNC: 26 MMOL/L (ref 18–29)
COMMENT: NORMAL
CREAT SERPL-MCNC: 0.84 MG/DL (ref 0.76–1.27)
GLUCOSE SERPL-MCNC: 103 MG/DL (ref 65–99)
HDLC SERPL-MCNC: 61 MG/DL
INTERPRETATION, 910389: NORMAL
LDLC SERPL CALC-MCNC: 89 MG/DL (ref 0–99)
POTASSIUM SERPL-SCNC: 4.6 MMOL/L (ref 3.5–5.2)
SODIUM SERPL-SCNC: 143 MMOL/L (ref 134–144)
TESTOST FREE SERPL-MCNC: 7.8 PG/ML (ref 6.6–18.1)
TESTOST SERPL-MCNC: 396 NG/DL (ref 348–1197)
TRIGL SERPL-MCNC: 92 MG/DL (ref 0–149)
VLDLC SERPL CALC-MCNC: 18 MG/DL (ref 5–40)

## 2018-01-08 ENCOUNTER — OFFICE VISIT (OUTPATIENT)
Dept: INTERNAL MEDICINE CLINIC | Age: 73
End: 2018-01-08

## 2018-01-08 ENCOUNTER — HOSPITAL ENCOUNTER (OUTPATIENT)
Dept: LAB | Age: 73
Discharge: HOME | End: 2018-01-08
Payer: MEDICARE

## 2018-01-08 VITALS
OXYGEN SATURATION: 96 % | HEART RATE: 80 BPM | TEMPERATURE: 96.3 F | SYSTOLIC BLOOD PRESSURE: 125 MMHG | WEIGHT: 189 LBS | BODY MASS INDEX: 27.06 KG/M2 | HEIGHT: 70 IN | RESPIRATION RATE: 20 BRPM | DIASTOLIC BLOOD PRESSURE: 88 MMHG

## 2018-01-08 DIAGNOSIS — N40.1 BENIGN PROSTATIC HYPERPLASIA WITH INCOMPLETE BLADDER EMPTYING: ICD-10-CM

## 2018-01-08 DIAGNOSIS — E78.00 PURE HYPERCHOLESTEROLEMIA: ICD-10-CM

## 2018-01-08 DIAGNOSIS — I10 ESSENTIAL HYPERTENSION: Primary | ICD-10-CM

## 2018-01-08 DIAGNOSIS — R79.89 LOW SERUM TESTOSTERONE LEVEL: ICD-10-CM

## 2018-01-08 DIAGNOSIS — R39.14 BENIGN PROSTATIC HYPERPLASIA WITH INCOMPLETE BLADDER EMPTYING: ICD-10-CM

## 2018-01-08 DIAGNOSIS — M19.90 OSTEOARTHRITIS, UNSPECIFIED OSTEOARTHRITIS TYPE, UNSPECIFIED SITE: ICD-10-CM

## 2018-01-08 PROCEDURE — 80053 COMPREHEN METABOLIC PANEL: CPT

## 2018-01-08 PROCEDURE — 80061 LIPID PANEL: CPT

## 2018-01-08 RX ORDER — SIMVASTATIN 40 MG/1
TABLET, FILM COATED ORAL
Qty: 90 TAB | Refills: 3 | Status: SHIPPED | OUTPATIENT
Start: 2018-01-08 | End: 2019-01-11 | Stop reason: SDUPTHER

## 2018-01-08 RX ORDER — DOXYCYCLINE 100 MG/1
CAPSULE ORAL
COMMUNITY
Start: 2017-11-06 | End: 2019-07-12 | Stop reason: ALTCHOICE

## 2018-01-08 RX ORDER — LOSARTAN POTASSIUM 50 MG/1
TABLET ORAL
Qty: 90 TAB | Refills: 3 | Status: SHIPPED | OUTPATIENT
Start: 2018-01-08 | End: 2019-01-11 | Stop reason: SDUPTHER

## 2018-01-08 RX ORDER — TADALAFIL 5 MG/1
TABLET ORAL
Qty: 90 TAB | Refills: 3 | Status: SHIPPED | OUTPATIENT
Start: 2018-01-08 | End: 2019-01-11 | Stop reason: ALTCHOICE

## 2018-01-08 RX ORDER — DICLOFENAC SODIUM 75 MG/1
TABLET, DELAYED RELEASE ORAL
Qty: 90 TAB | Refills: 3 | Status: SHIPPED | OUTPATIENT
Start: 2018-01-08 | End: 2019-01-11 | Stop reason: SDUPTHER

## 2018-01-08 RX ORDER — ALFUZOSIN HYDROCHLORIDE 10 MG/1
10 TABLET, EXTENDED RELEASE ORAL DAILY
COMMUNITY
Start: 2017-11-18 | End: 2019-01-11 | Stop reason: ALTCHOICE

## 2018-01-08 NOTE — PROGRESS NOTES
Chief Complaint   Patient presents with    Hypertension    Cholesterol Problem    Medication Problem     needs Dx-Cialis is for prostrate     1. Have you been to the ER, urgent care clinic since your last visit? Hospitalized since your last visit? Urgent Care--sinus infection-several mo ago. 2. Have you seen or consulted any other health care providers outside of the [address] [place] [address] since your last visit? Include any pap smears or colon screening.  No

## 2018-01-08 NOTE — LETTER
1/10/2018 3:43 PM 
 
Mr. Mat Tello [address] [id number] Dear Mat Tello: 
 
Please find your most recent results below. Resulted Orders METABOLIC PANEL, COMPREHENSIVE Result Value Ref Range Glucose 102 (H) 65 - 99 mg/dL BUN 22 8 - 27 mg/dL Creatinine 0.87 0.76 - 1.27 mg/dL GFR est non-AA 86 >59 mL/min/1.73 GFR est  >59 mL/min/1.73  
 BUN/Creatinine ratio 25 (H) 10 - 24 Sodium 144 134 - 144 mmol/L Potassium 5.1 3.5 - 5.2 mmol/L Chloride 105 96 - 106 mmol/L  
 CO2 25 18 - 29 mmol/L Calcium 9.9 8.6 - 10.2 mg/dL Protein, total 6.5 6.0 - 8.5 g/dL Albumin 4.2 3.5 - 4.8 g/dL GLOBULIN, TOTAL 2.3 1.5 - 4.5 g/dL A-G Ratio 1.8 1.2 - 2.2 Bilirubin, total 0.4 0.0 - 1.2 mg/dL Alk. phosphatase 76 39 - 117 IU/L  
 AST (SGOT) 25 0 - 40 IU/L  
 ALT (SGPT) 27 0 - 44 IU/L Narrative Performed at:  [address]  [id number] : Arlice Meckel MD, Phone:  [phone] LIPID PANEL Result Value Ref Range Cholesterol, total 164 100 - 199 mg/dL Triglyceride 72 0 - 149 mg/dL HDL Cholesterol 59 >39 mg/dL VLDL, calculated 14 5 - 40 mg/dL LDL, calculated 91 0 - 99 mg/dL Narrative Performed at:  [address]  [id number] : Arlice Meckel MD, Phone:  [phone] CVD REPORT Result Value Ref Range INTERPRETATION Note Comment:  
   Supplemental report is available. Narrative Performed at:  [address] [place] [address]  [id number] : Sulma Christianson PhD, Phone:  [phone] RECOMMENDATIONS: 
All labs are stable Please call me if you have any questions: [phone] Sincerely, 
 
 
Shasha Mane, DO

## 2018-01-08 NOTE — PROGRESS NOTES
HISTORY OF PRESENT ILLNESS  Ghislaine Raymond is a 67 y.o. male. Pt. comes in for f/u. Has multiple medical problems. He has chronic arthritic pain in the knees. Voltaren helps. Continues to have issues with BPH. He is followed by urologist.  Has been started on combination of Uroxatral and Detrol which seems to help. Also remains on Cialis for BPH which has helped for years. Cialis and other medications did not help his ED. Reports compliance with medications and diet. Med list and most recent labs/studies reviewed with pt. Trying to be active physically to control weight. Needs med refills. Due for repeat labs. No smoking. Social alcohol use. Reports no other new c/o. Hypertension    Pertinent negatives include no chest pain, no blurred vision, no headaches, no dizziness and no shortness of breath. Cholesterol Problem   Pertinent negatives include no chest pain, no abdominal pain, no headaches and no shortness of breath. Medication Problem   Pertinent negatives include no chest pain, no abdominal pain, no headaches and no shortness of breath. Review of Systems   Constitutional: Negative. HENT: Negative. Eyes: Negative for blurred vision. Respiratory: Negative for shortness of breath. Cardiovascular: Positive for leg swelling. Negative for chest pain. Gastrointestinal: Negative for abdominal pain. Genitourinary: Positive for urgency. Negative for dysuria. Musculoskeletal: Positive for joint pain. Negative for falls. Skin: Negative. Neurological: Negative for dizziness, sensory change, focal weakness and headaches. Endo/Heme/Allergies: Negative. Psychiatric/Behavioral: Negative. All other systems reviewed and are negative. Physical Exam   Constitutional: He is oriented to person, place, and time. He appears well-developed and well-nourished. No distress. pleasant   HENT:   Head: Normocephalic and atraumatic.    Mouth/Throat: Oropharynx is clear and moist. Eyes: Conjunctivae are normal. No scleral icterus. Neck: Normal range of motion. Neck supple. No JVD present. No thyromegaly present. Cardiovascular: Normal rate, regular rhythm, normal heart sounds and intact distal pulses. No murmur heard. Pulmonary/Chest: Effort normal and breath sounds normal. No respiratory distress. He has no wheezes. He has no rales. Abdominal: Soft. Bowel sounds are normal. He exhibits no distension. There is no tenderness. Musculoskeletal: He exhibits edema (L pedal with skin changes,chronic, stable). He exhibits no tenderness. djd  L knee surgical scar   Neurological: He is alert and oriented to person, place, and time. Coordination normal.   Skin: Skin is warm and dry. No rash noted. Psychiatric: He has a normal mood and affect. His behavior is normal.   Nursing note and vitals reviewed. ASSESSMENT and PLAN  Diagnoses and all orders for this visit:    1. Essential hypertension  -     METABOLIC PANEL, COMPREHENSIVE  -     LIPID PANEL  -     CBC W/O DIFF    2. Benign prostatic hyperplasia with incomplete bladder emptying   tadalafil (CIALIS) 5 mg tablet; 1 daily for BPH      3. Pure hypercholesterolemia  -     METABOLIC PANEL, COMPREHENSIVE  -     LIPID PANEL  -     CBC W/O DIFF    4. Osteoarthritis, unspecified osteoarthritis type, unspecified site    5. Low serum testosterone level  -     tadalafil (CIALIS) 5 mg tablet; 1 daily for BPH    Other orders  -     simvastatin (ZOCOR) 40 mg tablet; TAKE 1 TABLET NIGHTLY  -     diclofenac EC (VOLTAREN) 75 mg EC tablet; TAKE 1 TABLET DAILY  -     losartan (COZAAR) 50 mg tablet; TAKE 1 TABLET DAILY      Follow-up Disposition:  Return in about 6 months (around 7/8/2018).    lab results and schedule of future lab studies reviewed with patient  reviewed diet, exercise and weight control  reviewed medications and side effects in detail  F/u with other MD's as scheduled  We will call insurance to get approval for Cialis which is for BPH

## 2018-01-08 NOTE — MR AVS SNAPSHOT
Visit Information Date & Time Provider Department Dept. Phone Encounter #  
 1/8/2018  9:00 AM Blossom Contreras, [address] [facility] Internal Medicine [phone] [id number] Follow-up Instructions Return in about 6 months (around 7/8/2018). Upcoming Health Maintenance Date Due DTaP/Tdap/Td series (1 - Tdap) 12/14/2011 GLAUCOMA SCREENING Q2Y 4/8/2018 MEDICARE YEARLY EXAM 7/8/2018 COLONOSCOPY 2/15/2023 Allergies as of 1/8/2018  Review Complete On: 1/8/2018 By: Blossom Contreras DO Severity Noted Reaction Type Reactions Bactrim [Sulfamethoprim Ds] Medium 08/30/2013   Side Effect Hives, Other (comments) Nausea,sweating,cold sweats Pcn [Penicillins] Medium 12/13/2010   Intolerance Swelling Throat and face Lisinopril  12/21/2015    Angioedema Current Immunizations  Reviewed on 8/11/2015 Name Date Influenza Vaccine 10/20/2014 Pneumococcal Conjugate (PCV-13) 8/12/2015 TD Vaccine 12/13/2011 ZZZ-RETIRED (DO NOT USE) Pneumococcal Vaccine (Unspecified Type) 12/13/2011 Zoster Vaccine, Live 11/10/2014 Not reviewed this visit You Were Diagnosed With   
  
 Codes Comments Essential hypertension    -  Primary ICD-10-CM: I10 
ICD-9-CM: 401.9 Benign prostatic hyperplasia with incomplete bladder emptying     ICD-10-CM: N40.1, R39.14 ICD-9-CM: 600.01, 788.21 Pure hypercholesterolemia     ICD-10-CM: E78.00 ICD-9-CM: 272.0 Osteoarthritis, unspecified osteoarthritis type, unspecified site     ICD-10-CM: M19.90 ICD-9-CM: 715.90 Low serum testosterone level     ICD-10-CM: E29.1 ICD-9-CM: 257.2 Impotence of organic origin     ICD-10-CM: N52.9 ICD-9-CM: 607.84 Vitals BP Pulse Temp Resp Height(growth percentile) Weight(growth percentile) (!) 154/92 (BP 1 Location: Left arm, BP Patient Position: Sitting) 80 96.3 °F (35.7 °C) (Oral) 20 5' 10\" (1.778 m) 189 lb (85.7 kg) SpO2 BMI Smoking Status 96% 27.12 kg/m2 Current Some Day Smoker Vitals History BMI and BSA Data Body Mass Index Body Surface Area  
 27.12 kg/m 2 2.06 m 2 Preferred Pharmacy Pharmacy Name Phone [facility] [phone] Your Updated Medication List  
  
   
This list is accurate as of: 18  9:15 AM.  Always use your most recent med list.  
  
  
  
  
 alfuzosin SR 10 mg SR tablet Commonly known as:  UROXATRAL  
  
 aspirin delayed-release 81 mg tablet Take 81 mg by mouth daily. DETROL 2 mg tablet Generic drug:  tolterodine Take 2 mg by mouth daily. diclofenac EC 75 mg EC tablet Commonly known as:  VOLTAREN  
TAKE 1 TABLET DAILY  
  
 doxycycline 100 mg capsule Commonly known as:  VIBRAMYCIN  
  
 ketoconazole 2 % topical cream  
Commonly known as:  NIZORAL Apply to affected areas on face daily  
  
 losartan 50 mg tablet Commonly known as:  COZAAR  
TAKE 1 TABLET DAILY  
  
 M-VIT PO Take 1 Tab by mouth. Takes one multivitamin po daily. Does not have iron. Omega-3-DHA-EPA-Fish Oil 1,000 mg (120 mg-180 mg) Cap Take 1,200 mg by mouth daily. simvastatin 40 mg tablet Commonly known as:  ZOCOR  
TAKE 1 TABLET NIGHTLY  
  
 tadalafil 5 mg tablet Commonly known as:  CIALIS  
1 daily for BPH Prescriptions Sent to Pharmacy Refills  
 simvastatin (ZOCOR) 40 mg tablet 3 Sig: TAKE 1 TABLET NIGHTLY Class: Normal  
 Pharmacy: [address], [address] Ph #: [phone]  
 tadalafil (CIALIS) 5 mg tablet 3 Si daily for BPH Class: Normal  
 Pharmacy: [address], [address] Ph #: [phone]  
 diclofenac EC (VOLTAREN) 75 mg EC tablet 3 Sig: TAKE 1 TABLET DAILY Class: Normal  
 Pharmacy: [address], [address] Ph #: [phone] losartan (COZAAR) 50 mg tablet 3 Sig: TAKE 1 TABLET DAILY Class: Normal  
 Pharmacy: [address], [address] [facility] #: [phone] We Performed the Following CBC W/O DIFF [06009 CPT(R)] LIPID PANEL [61704 CPT(R)] METABOLIC PANEL, COMPREHENSIVE [81193 CPT(R)] Follow-up Instructions Return in about 6 months (around 7/8/2018). Please provide this summary of care documentation to your next provider. Your primary care clinician is listed as Gonsalo Ferguson. If you have any questions after today's visit, please call [phone].

## 2018-01-09 LAB
ALBUMIN SERPL-MCNC: 4.2 G/DL (ref 3.5–4.8)
ALBUMIN/GLOB SERPL: 1.8 {RATIO} (ref 1.2–2.2)
ALP SERPL-CCNC: 76 IU/L (ref 39–117)
ALT SERPL-CCNC: 27 IU/L (ref 0–44)
AST SERPL-CCNC: 25 IU/L (ref 0–40)
BILIRUB SERPL-MCNC: 0.4 MG/DL (ref 0–1.2)
BUN SERPL-MCNC: 22 MG/DL (ref 8–27)
BUN/CREAT SERPL: 25 (ref 10–24)
CALCIUM SERPL-MCNC: 9.9 MG/DL (ref 8.6–10.2)
CHLORIDE SERPL-SCNC: 105 MMOL/L (ref 96–106)
CHOLEST SERPL-MCNC: 164 MG/DL (ref 100–199)
CO2 SERPL-SCNC: 25 MMOL/L (ref 18–29)
CREAT SERPL-MCNC: 0.87 MG/DL (ref 0.76–1.27)
GLOBULIN SER CALC-MCNC: 2.3 G/DL (ref 1.5–4.5)
GLUCOSE SERPL-MCNC: 102 MG/DL (ref 65–99)
HDLC SERPL-MCNC: 59 MG/DL
INTERPRETATION, 910389: NORMAL
LDLC SERPL CALC-MCNC: 91 MG/DL (ref 0–99)
POTASSIUM SERPL-SCNC: 5.1 MMOL/L (ref 3.5–5.2)
PROT SERPL-MCNC: 6.5 G/DL (ref 6–8.5)
SODIUM SERPL-SCNC: 144 MMOL/L (ref 134–144)
TRIGL SERPL-MCNC: 72 MG/DL (ref 0–149)
VLDLC SERPL CALC-MCNC: 14 MG/DL (ref 5–40)

## 2018-01-15 ENCOUNTER — TELEPHONE (OUTPATIENT)
Dept: INTERNAL MEDICINE CLINIC | Age: 73
End: 2018-01-15

## 2018-01-15 NOTE — TELEPHONE ENCOUNTER
PA obtained for Cialis - Dx BPH, case #[id number], per [employer] with [employer] (date approved 12- through 01/15/2019. Patient has been notified.

## 2018-07-13 ENCOUNTER — HOSPITAL ENCOUNTER (OUTPATIENT)
Dept: LAB | Age: 73
Discharge: HOME | End: 2018-07-13
Payer: MEDICARE

## 2018-07-13 ENCOUNTER — OFFICE VISIT (OUTPATIENT)
Dept: INTERNAL MEDICINE CLINIC | Age: 73
End: 2018-07-13

## 2018-07-13 VITALS
HEART RATE: 64 BPM | DIASTOLIC BLOOD PRESSURE: 84 MMHG | RESPIRATION RATE: 18 BRPM | HEIGHT: 70 IN | BODY MASS INDEX: 26.86 KG/M2 | SYSTOLIC BLOOD PRESSURE: 137 MMHG | OXYGEN SATURATION: 96 % | WEIGHT: 187.6 LBS | TEMPERATURE: 96.1 F

## 2018-07-13 DIAGNOSIS — N40.1 BENIGN PROSTATIC HYPERPLASIA WITH INCOMPLETE BLADDER EMPTYING: ICD-10-CM

## 2018-07-13 DIAGNOSIS — I10 ESSENTIAL HYPERTENSION: Primary | ICD-10-CM

## 2018-07-13 DIAGNOSIS — M19.90 OSTEOARTHRITIS, UNSPECIFIED OSTEOARTHRITIS TYPE, UNSPECIFIED SITE: ICD-10-CM

## 2018-07-13 DIAGNOSIS — R73.9 HYPERGLYCEMIA: ICD-10-CM

## 2018-07-13 DIAGNOSIS — Z71.89 ACP (ADVANCE CARE PLANNING): ICD-10-CM

## 2018-07-13 DIAGNOSIS — E78.00 PURE HYPERCHOLESTEROLEMIA: ICD-10-CM

## 2018-07-13 DIAGNOSIS — M65.322 TRIGGER INDEX FINGER OF LEFT HAND: ICD-10-CM

## 2018-07-13 DIAGNOSIS — R39.14 BENIGN PROSTATIC HYPERPLASIA WITH INCOMPLETE BLADDER EMPTYING: ICD-10-CM

## 2018-07-13 PROCEDURE — 36415 COLL VENOUS BLD VENIPUNCTURE: CPT

## 2018-07-13 PROCEDURE — 80048 BASIC METABOLIC PNL TOTAL CA: CPT

## 2018-07-13 PROCEDURE — 84450 TRANSFERASE (AST) (SGOT): CPT

## 2018-07-13 PROCEDURE — 83036 HEMOGLOBIN GLYCOSYLATED A1C: CPT

## 2018-07-13 PROCEDURE — 84460 ALANINE AMINO (ALT) (SGPT): CPT

## 2018-07-13 PROCEDURE — 80061 LIPID PANEL: CPT

## 2018-07-13 RX ORDER — TAMSULOSIN HYDROCHLORIDE 0.4 MG/1
CAPSULE ORAL
COMMUNITY
Start: 2018-04-18 | End: 2022-01-11 | Stop reason: SDUPTHER

## 2018-07-13 NOTE — PROGRESS NOTES
HISTORY OF PRESENT ILLNESS  Nathaly Figueroa is a 67 y.o. male. Pt. comes in for f/u. Has multiple medical problems. BP and cholesterol have been stable. Reports left index trigger finger from time to time. Also has had bruising of right index finger while playing golf. No obvious trauma. His chronic arthritic pains are stable. BPH is stable on medications. Reports compliance with medications and diet. Med list and most recent labs/studies reviewed with pt. Glucose has been borderline high. Trying to be active physically to control weight. Due for repeat labs. ACP discussed. He already has swollen 5. Reports no other new c/o. Hypertension    Pertinent negatives include no chest pain, no blurred vision, no headaches, no dizziness and no shortness of breath. Follow Up Chronic Condition   Pertinent negatives include no chest pain, no abdominal pain, no headaches and no shortness of breath. Finger Pain   Pertinent negatives include no chest pain, no abdominal pain, no headaches and no shortness of breath. Cholesterol Problem   Pertinent negatives include no chest pain, no abdominal pain, no headaches and no shortness of breath. Review of Systems   Constitutional: Negative. HENT: Negative. Eyes: Negative for blurred vision. Respiratory: Negative for shortness of breath. Cardiovascular: Positive for leg swelling. Negative for chest pain. Gastrointestinal: Negative for abdominal pain. Genitourinary: Positive for urgency. Negative for dysuria. Musculoskeletal: Positive for joint pain. Negative for falls. Skin: Negative. Neurological: Negative for dizziness, sensory change, focal weakness and headaches. Endo/Heme/Allergies: Negative. Psychiatric/Behavioral: Negative. All other systems reviewed and are negative. Physical Exam   Constitutional: He is oriented to person, place, and time. He appears well-developed and well-nourished. No distress.    pleasant   HENT: Head: Normocephalic and atraumatic. Mouth/Throat: Oropharynx is clear and moist.   Eyes: Conjunctivae are normal.   Neck: Normal range of motion. Neck supple. No JVD present. No thyromegaly present. Cardiovascular: Normal rate, regular rhythm, normal heart sounds and intact distal pulses. No murmur heard. Pulmonary/Chest: Effort normal and breath sounds normal. No respiratory distress. He has no wheezes. He has no rales. Abdominal: Soft. Bowel sounds are normal. He exhibits no distension. There is no tenderness. Musculoskeletal: He exhibits edema (L pedal with skin changes,chronic, stable). He exhibits no tenderness. djd  L knee surgical scar   Neurological: He is alert and oriented to person, place, and time. Coordination normal.   Skin: Skin is warm and dry. No rash noted. Psychiatric: He has a normal mood and affect. His behavior is normal.   Nursing note and vitals reviewed. ASSESSMENT and PLAN  Diagnoses and all orders for this visit:    1. Essential hypertension  -     LIPID PANEL  -     METABOLIC PANEL, BASIC  -     ALT  -     AST    2. Pure hypercholesterolemia  -     LIPID PANEL  -     METABOLIC PANEL, BASIC  -     ALT  -     AST    3. Osteoarthritis, unspecified osteoarthritis type, unspecified site    4. Benign prostatic hyperplasia with incomplete bladder emptying    5. Hyperglycemia  -     HEMOGLOBIN A1C WITH EAG    6. ACP (advance care planning)    7. Trigger index finger of left hand      Follow-up Disposition:  Return in about 6 months (around 1/13/2019).    lab results and schedule of future lab studies reviewed with patient  reviewed diet, exercise and weight control  reviewed medications and side effects in detail  Overall stable

## 2018-07-13 NOTE — LETTER
July 13, 2018 Mayuri Ta [address] [facility] [address] [id number] Dear Regina Mccarty: Thank you for requesting access to GoingOn. Please follow the instructions below to securely access and download your online medical record. GoingOn allows you to send messages to your doctor, view your test results, renew your prescriptions, schedule appointments, and more. How Do I Sign Up? 1. In your internet browser, go to [email or web address]  
2. Click on the First Time User? Click Here link in the Sign In box. You will be redirected to the New Member Sign Up page. 3. Enter your GoingOn Access Code exactly as it appears below. You will not need to use this code after youve completed the sign-up process. If you do not sign up before the expiration date, you must request a new code. GoingOn Access Code: [email or web address] Expires: 10/11/2018  8:56 AM  
 
4. Enter the last four digits of your Social Security Number (xxxx) and Date of Birth (mm/dd/yyyy) as indicated and click Submit. You will be taken to the next sign-up page. 5. Create a GoingOn ID. This will be your GoingOn login ID and cannot be changed, so think of one that is secure and easy to remember. 6. Create a GoingOn password. You can change your password at any time. 7. Enter your Password Reset Question and Answer. This can be used at a later time if you forget your password. 8. Enter your e-mail address. You will receive e-mail notification when new information is available in [address]. 9. Click Sign Up. You can now view and download portions of your medical record. 10. Click the Download Summary menu link to download a portable copy of your medical information. Additional Information If you have questions, please visit the Frequently Asked Questions section of the GoingOn website at [email or web address] Remember, GoingOn is NOT to be used for urgent needs. For medical emergencies, dial 911. Now available from your iPhone and Android! Sincerely, Leo Sawyer

## 2018-07-13 NOTE — PROGRESS NOTES
Chief Complaint   Patient presents with    Hypertension     follow up  and lab work     1. Have you been to the ER, urgent care clinic since your last visit? Hospitalized since your last visit? no    2. Have you seen or consulted any other health care providers outside of the [facility] since your last visit? Include any pap smears or colon screening.   no    Visit Vitals    /84 (BP 1 Location: Left arm, BP Patient Position: Sitting)    Pulse 64    Temp 96.1 °F (35.6 °C) (Oral)    Resp 18    Ht 5' 10\" (1.778 m)    Wt 187 lb 9.6 oz (85.1 kg)    SpO2 96%    BMI 26.92 kg/m2

## 2018-07-13 NOTE — MR AVS SNAPSHOT
[address] [facility] [address] [place] [address] 
[phone] Patient: Nguyễn Sanchez MRN:  [id number] Visit Information Date & Time Provider Department Dept. Phone Encounter #  
 7/13/2018  8:30 AM Nidia Trevizo [facility] Internal Medicine [phone] [id number] Follow-up Instructions Return in about 6 months (around 1/13/2019). Your Appointments 1/11/2019  9:00 AM  
PHYSICAL PRE OP with Nidia Trevizo DO [facility] Internal Medicine ([address] [facility]) Appt Note: physical  
 [address] [facility] [address] [facility] [address]  
[phone]  
  
   
 [address] [facility] [address] Upcoming Health Maintenance Date Due DTaP/Tdap/Td series (1 - Tdap) 12/14/2011 GLAUCOMA SCREENING Q2Y 4/8/2018 MEDICARE YEARLY EXAM 7/8/2018 Influenza Age 5 to Adult 8/1/2018 COLONOSCOPY 2/15/2023 Allergies as of 7/13/2018  Review Complete On: 7/13/2018 By: Nidia Trevizo DO Severity Noted Reaction Type Reactions Bactrim [Sulfamethoprim Ds] Medium 08/30/2013   Side Effect Hives, Other (comments) Nausea,sweating,cold sweats Pcn [Penicillins] Medium 12/13/2010   Intolerance Swelling Throat and face Lisinopril  12/21/2015    Angioedema Current Immunizations  Reviewed on 8/11/2015 Name Date Influenza Vaccine 10/20/2014 Pneumococcal Conjugate (PCV-13) 8/12/2015 TD Vaccine 12/13/2011 ZZZ-RETIRED (DO NOT USE) Pneumococcal Vaccine (Unspecified Type) 12/13/2011 Zoster Vaccine, Live 11/10/2014 Not reviewed this visit You Were Diagnosed With   
  
 Codes Comments Essential hypertension    -  Primary ICD-10-CM: I10 
ICD-9-CM: 401.9 Pure hypercholesterolemia     ICD-10-CM: E78.00 ICD-9-CM: 272.0 Osteoarthritis, unspecified osteoarthritis type, unspecified site     ICD-10-CM: M19.90 ICD-9-CM: 715.90 Benign prostatic hyperplasia with incomplete bladder emptying     ICD-10-CM: N40.1, R39.14 ICD-9-CM: 600.01, 788.21 Hyperglycemia     ICD-10-CM: R73.9 ICD-9-CM: 790.29 ACP (advance care planning)     ICD-10-CM: Z71.89 ICD-9-CM: V65.49 Vitals BP Pulse Temp Resp Height(growth percentile) Weight(growth percentile) 137/84 (BP 1 Location: Left arm, BP Patient Position: Sitting) 64 96.1 °F (35.6 °C) (Oral) 18 5' 10\" (1.778 m) 187 lb 9.6 oz (85.1 kg) SpO2 BMI Smoking Status 96% 26.92 kg/m2 Current Some Day Smoker Vitals History BMI and BSA Data Body Mass Index Body Surface Area  
 26.92 kg/m 2 2.05 m 2 Preferred Pharmacy Pharmacy Name Phone [facility], [facility] [phone] Your Updated Medication List  
  
   
This list is accurate as of 7/13/18  8:53 AM.  Always use your most recent med list.  
  
  
  
  
 alfuzosin SR 10 mg SR tablet Commonly known as:  Freeda [place] Take 10 mg by mouth daily. aspirin delayed-release 81 mg tablet Take 81 mg by mouth daily. DETROL 2 mg tablet Generic drug:  tolterodine Take 2 mg by mouth daily. diclofenac EC 75 mg EC tablet Commonly known as:  VOLTAREN  
TAKE 1 TABLET DAILY  
  
 doxycycline 100 mg capsule Commonly known as:  VIBRAMYCIN  
  
 ketoconazole 2 % topical cream  
Commonly known as:  NIZORAL Apply to affected areas on face daily  
  
 losartan 50 mg tablet Commonly known as:  COZAAR  
TAKE 1 TABLET DAILY  
  
 M-VIT PO Take 1 Tab by mouth. Takes one multivitamin po daily. Does not have iron. omega 3-DHA-EPA-fish oil 1,000 mg (120 mg-180 mg) capsule Take 1,200 mg by mouth daily. simvastatin 40 mg tablet Commonly known as:  ZOCOR  
TAKE 1 TABLET NIGHTLY  
  
 tadalafil 5 mg tablet Commonly known as:  CIALIS  
1 daily for BPH  
  
 tamsulosin 0.4 mg capsule Commonly known as:  FLOMAX We Performed the Following ALT [id number] CPT(R)] AST Z8121994 CPT(R)] HEMOGLOBIN A1C WITH EAG [74108 CPT(R)] LIPID PANEL [29752 CPT(R)] METABOLIC PANEL, BASIC [55076 CPT(R)] Follow-up Instructions Return in about 6 months (around 1/13/2019). Please provide this summary of care documentation to your next provider. Your primary care clinician is listed as Yandy Pearson. If you have any questions after today's visit, please call [phone].

## 2018-07-14 LAB
ALT SERPL-CCNC: 26 IU/L (ref 0–44)
AST SERPL-CCNC: 21 IU/L (ref 0–40)
BUN SERPL-MCNC: 21 MG/DL (ref 8–27)
BUN/CREAT SERPL: 19 (ref 10–24)
CALCIUM SERPL-MCNC: 9.4 MG/DL (ref 8.6–10.2)
CHLORIDE SERPL-SCNC: 106 MMOL/L (ref 96–106)
CHOLEST SERPL-MCNC: 148 MG/DL (ref 100–199)
CO2 SERPL-SCNC: 25 MMOL/L (ref 20–29)
CREAT SERPL-MCNC: 1.08 MG/DL (ref 0.76–1.27)
EST. AVERAGE GLUCOSE BLD GHB EST-MCNC: 108 MG/DL
GLUCOSE SERPL-MCNC: 108 MG/DL (ref 65–99)
HBA1C MFR BLD: 5.4 % (ref 4.8–5.6)
HDLC SERPL-MCNC: 57 MG/DL
INTERPRETATION, 910389: NORMAL
LDLC SERPL CALC-MCNC: 78 MG/DL (ref 0–99)
POTASSIUM SERPL-SCNC: 5.1 MMOL/L (ref 3.5–5.2)
SODIUM SERPL-SCNC: 142 MMOL/L (ref 134–144)
TRIGL SERPL-MCNC: 67 MG/DL (ref 0–149)
VLDLC SERPL CALC-MCNC: 13 MG/DL (ref 5–40)

## 2019-01-11 ENCOUNTER — OFFICE VISIT (OUTPATIENT)
Dept: INTERNAL MEDICINE CLINIC | Age: 74
End: 2019-01-11

## 2019-01-11 VITALS
DIASTOLIC BLOOD PRESSURE: 85 MMHG | BODY MASS INDEX: 26.26 KG/M2 | RESPIRATION RATE: 15 BRPM | SYSTOLIC BLOOD PRESSURE: 125 MMHG | WEIGHT: 183.4 LBS | HEART RATE: 82 BPM | HEIGHT: 70 IN | TEMPERATURE: 95.1 F | OXYGEN SATURATION: 97 %

## 2019-01-11 DIAGNOSIS — E78.00 PURE HYPERCHOLESTEROLEMIA: ICD-10-CM

## 2019-01-11 DIAGNOSIS — I87.2 VENOUS INSUFFICIENCY OF BOTH LOWER EXTREMITIES: ICD-10-CM

## 2019-01-11 DIAGNOSIS — I10 ESSENTIAL HYPERTENSION: Primary | ICD-10-CM

## 2019-01-11 DIAGNOSIS — Z00.00 MEDICARE ANNUAL WELLNESS VISIT, SUBSEQUENT: ICD-10-CM

## 2019-01-11 RX ORDER — DICLOFENAC SODIUM 75 MG/1
TABLET, DELAYED RELEASE ORAL
Qty: 90 TAB | Refills: 3 | Status: SHIPPED | OUTPATIENT
Start: 2019-01-11 | End: 2020-01-10 | Stop reason: SDUPTHER

## 2019-01-11 RX ORDER — SIMVASTATIN 40 MG/1
TABLET, FILM COATED ORAL
Qty: 90 TAB | Refills: 3 | Status: SHIPPED | OUTPATIENT
Start: 2019-01-11 | End: 2020-01-10 | Stop reason: SDUPTHER

## 2019-01-11 RX ORDER — LOSARTAN POTASSIUM 50 MG/1
TABLET ORAL
Qty: 90 TAB | Refills: 3 | Status: SHIPPED | OUTPATIENT
Start: 2019-01-11 | End: 2020-01-10 | Stop reason: SDUPTHER

## 2019-01-11 NOTE — PROGRESS NOTES
Health Maintenance Due   Topic Date Due    Shingrix Vaccine Age 49> (1 of 2) 07/17/1995    DTaP/Tdap/Td series (1 - Tdap) 12/14/2011    GLAUCOMA SCREENING Q2Y  04/08/2018    MEDICARE YEARLY EXAM  07/08/2018    Influenza Age 9 to Adult  08/01/2018       Chief Complaint   Patient presents with    Complete Physical       1. Have you been to the ER, urgent care clinic since your last visit? Hospitalized since your last visit? Yes, [facility] for cellulitis, left medial ankle    2. Have you seen or consulted any other health care providers outside of the [address] [place] [address] since your last visit? Include any pap smears or colon screening. No    3) Do you have an Advance Directive on file? yes    4) Are you interested in receiving information on Advance Directives? NO      Patient is accompanied by self I have received verbal consent from [address] [place] [address] to discuss any/all medical information while they are present in the room.

## 2019-01-11 NOTE — PROGRESS NOTES
Rob Pereyra is a 68 y.o. male and presents for annual Medicare Wellness Visit. Problem List: Reviewed with patient and discussed risk factors. Patient Active Problem List   Diagnosis Code    HTN (hypertension) I10    Pure hypercholesterolemia E78.00    Retinal detachment H33.20    DJD (degenerative joint disease) M19.90    ED (erectile dysfunction) N52.9    Hypotestosteronism E34.9    Venous insufficiency of leg I87.2    S/P TKR (total knee replacement) Z96.659    BPH (benign prostatic hyperplasia) N40.0    ACE inhibitor-aggravated angioedema T78. 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    Seborrheic dermatitis L21.9    Hyperglycemia R73.9    ACP (advance care planning) Z71.89    Trigger index finger of left hand M65.322       Current medical providers:  Patient Care Team:  Tray Lagunas DO as PCP - Nunu Ryan MD (Ophthalmology)  Jordyn Pastrana MD (Gastroenterology)  Massiel Mcgregor RN as Ambulatory Care Navigator (Internal Medicine)  Ray Harden, JESSICA as Physician (Optometry)    PSH: Reviewed with patient  Past Surgical History:   Procedure Laterality Date    HX CATARACT REMOVAL      bilateral then bilateral laser surgery to open back of eye to let more light in    HX HERNIA REPAIR      inguinal hernia repair    HX ORTHOPAEDIC      8 left knee surgeries - arthroscopies/ACL repair/knee replacement    HX RETINAL DETACHMENT REPAIR      2 right eye/1 left eye to repair retina and macula    HX TONSILLECTOMY      HX VASECTOMY          SH: Reviewed with patient  Social History     Tobacco Use    Smoking status: Current Some Day Smoker     Types: Cigars    Smokeless tobacco: Never Used    Tobacco comment: occasional cigar   Substance Use Topics    Alcohol use:  Yes     Alcohol/week: 0.5 oz     Types: 1 Glasses of wine per week    Drug use: No       FH: Reviewed with patient  Family History   Problem Relation Age of Onset    Lung Disease Mother        Medications/Allergies: Reviewed with patient  Current Outpatient Medications on File Prior to Visit   Medication Sig Dispense Refill    tamsulosin (FLOMAX) 0.4 mg capsule       doxycycline (VIBRAMYCIN) 100 mg capsule       simvastatin (ZOCOR) 40 mg tablet TAKE 1 TABLET NIGHTLY 90 Tab 3    diclofenac EC (VOLTAREN) 75 mg EC tablet TAKE 1 TABLET DAILY 90 Tab 3    losartan (COZAAR) 50 mg tablet TAKE 1 TABLET DAILY 90 Tab 3    ketoconazole (NIZORAL) 2 % topical cream Apply to affected areas on face daily 30 g 2    tolterodine (DETROL) 2 mg tablet Take 2 mg by mouth daily.  aspirin delayed-release 81 mg tablet Take 81 mg by mouth daily.  Omega-3-DHA-EPA-Fish Oil 1,000 (120-180) mg Cap Take 1,200 mg by mouth daily.  PV W-O MILLICENT/FERROUS FUMARATE/FA (M-VIT PO) Take 1 Tab by mouth. Takes one multivitamin po daily. Does not have iron.  alfuzosin SR (UROXATRAL) 10 mg SR tablet Take 10 mg by mouth daily.  tadalafil (CIALIS) 5 mg tablet 1 daily for BPH 90 Tab 3     No current facility-administered medications on file prior to visit. Allergies   Allergen Reactions    Bactrim [Sulfamethoprim Ds] Hives and Other (comments)     Nausea,sweating,cold sweats    Pcn [Penicillins] Swelling     Throat and face    Lisinopril Angioedema       Objective:  Visit Vitals  BP (!) 138/96 (BP 1 Location: Left arm, BP Patient Position: Sitting)   Pulse 82   Temp 95.1 °F (35.1 °C) (Oral)   Resp 15   Ht 5' 10\" (1.778 m)   Wt 183 lb 6.4 oz (83.2 kg)   SpO2 97%   BMI 26.32 kg/m²    Body mass index is 26.32 kg/m². Assessment of cognitive impairment: Alert and oriented x 3    Depression Screen:   PHQ over the last two weeks 1/11/2019   Little interest or pleasure in doing things Not at all   Feeling down, depressed, irritable, or hopeless Not at all   Total Score PHQ 2 0       Fall Risk Assessment:    Fall Risk Assessment, last 12 mths 1/11/2019   Able to walk? Yes   Fall in past 12 months?  No       Functional Ability:   Does the patient exhibit a steady gait? yes   How long did it take the patient to get up and walk from a sitting position? 7 sec   Is the patient self reliant?  (ie can do own laundry, meals, household chores)  yes     Does the patient handle his/her own medications? yes     Does the patient handle his/her own money? yes     Is the patients home safe (ie good lighting, handrails on stairs and bath, etc.)? yes     Did you notice or did patient express any hearing difficulties? no     Did you notice or did patient express any vision difficulties?   no     Were distance and reading eye charts used? no       Advance Care Planning:   Patient was offered the opportunity to discuss advance care planning:  yes     Does patient have an Advance Directive:  yes   If no, did you provide information on Caring Connections?  no       Plan:      No orders of the defined types were placed in this encounter. Health Maintenance   Topic Date Due    Shingrix Vaccine Age 49> (1 of 2) 07/17/1995    DTaP/Tdap/Td series (1 - Tdap) 12/14/2011    GLAUCOMA SCREENING Q2Y  04/08/2018    Influenza Age 9 to Adult  08/01/2018    MEDICARE YEARLY EXAM  01/12/2020    COLONOSCOPY  02/15/2023    Pneumococcal 65+ Low/Medium Risk  Completed    Hepatitis C Screening  Addressed       *Patient verbalized understanding and agreement with the plan. A copy of the After Visit Summary with personalized health plan was given to the patient today.

## 2019-01-11 NOTE — PROGRESS NOTES
Schedule of Personalized Health Plan  (Provide Copy to Patient)  The best way to stay healthy is to live a healthy lifestyle. A healthy lifestyle includes regular exercise, eating a well-balanced diet, keeping a healthy weight and not smoking. Regular physical exams and screening tests are another important way to take care of yourself. Preventive exams provided by health care providers can find health problems early when treatment works best and can keep you from getting certain diseases or illnesses. Preventive services include exams, lab tests, screenings, shots, monitoring and information to help you take care of your own health. All people over 65 should have a pneumonia shot. Pneumonia shots are usually only needed once in a lifetime unless your doctor decides differently. All people over 65 should have a yearly flu shot. People over 65 are at medium to high risk for Hepatitis B. Three shots are needed for complete protection. In addition to your physical exam, some screening tests are recommended:    Bone mass measurement (dexa scan) is recommended every two years if you have certain risk factors, such as personal history of vertebral fracture or chronic steroid medication use    Diabetes Mellitus screening is recommended every year. Glaucoma is an eye disease caused by high pressure in the eye. An eye exam is recommended every year. Cardiovascular screening tests that check your cholesterol and other blood fat (lipid) levels are recommended every five years. Colorectal Cancer screening tests help to find pre-cancerous polyps (growths in the colon) so they can be removed before they turn into cancer. Tests ordered for screening depend on your personal and family history risk factors.     Screening for Prostate Cancer is recommended yearly with a digital rectal exam and/or a PSA test    Here is a list of your current Health Maintenance items with a due date:  Health Maintenance Topic Date Due    Shingrix Vaccine Age 50> (1 of 2) 07/17/1995    DTaP/Tdap/Td series (1 - Tdap) 12/14/2011    GLAUCOMA SCREENING Q2Y  04/08/2018    Influenza Age 9 to Adult  08/01/2018    MEDICARE YEARLY EXAM  01/12/2020    COLONOSCOPY  02/15/2023    Pneumococcal 65+ Low/Medium Risk  Completed    Hepatitis C Screening  Addressed

## 2019-01-30 NOTE — PROGRESS NOTES
HISTORY OF PRESENT ILLNESS  Di Black is a 68 y.o. male. Pt. comes in for f/u. Has multiple medical problems. BP is stable. Has some chronic arthritic pains which are relatively stable. Has chronic lower extremity venous insufficiency and skin changes. No recent infections. Remains on medications for BPH. Has ED but medications do not help. Denies smoking. Drinks alcohol socially. Reports compliance with medications and diet. Med list and most recent labs/studies reviewed with pt. Trying to be active physically to control weight. Needs med refills. Reports no other new c/o. HPI    Review of Systems   Constitutional: Negative. HENT: Negative. Eyes: Negative for blurred vision. Respiratory: Negative for shortness of breath. Cardiovascular: Positive for leg swelling. Negative for chest pain. Gastrointestinal: Negative for abdominal pain. Genitourinary: Positive for urgency. Negative for dysuria. Musculoskeletal: Positive for joint pain. Negative for falls. Skin: Negative. Neurological: Negative for dizziness, sensory change, focal weakness and headaches. Endo/Heme/Allergies: Negative. Psychiatric/Behavioral: Negative. All other systems reviewed and are negative. Physical Exam   Constitutional: He is oriented to person, place, and time. He appears well-developed and well-nourished. No distress. pleasant   HENT:   Head: Normocephalic and atraumatic. Mouth/Throat: Oropharynx is clear and moist.   Eyes: Conjunctivae are normal. No scleral icterus. Neck: Normal range of motion. Neck supple. No JVD present. No thyromegaly present. Cardiovascular: Normal rate, regular rhythm, normal heart sounds and intact distal pulses. No murmur heard. Pulmonary/Chest: Effort normal and breath sounds normal. No respiratory distress. He has no wheezes. He has no rales. Abdominal: Soft. Bowel sounds are normal. He exhibits no distension. There is no tenderness.    Musculoskeletal: He exhibits edema (L pedal with skin changes,chronic, stable). He exhibits no tenderness. djd  L knee surgical scar   Neurological: He is alert and oriented to person, place, and time. Coordination normal.   Skin: Skin is warm and dry. No rash noted. Psychiatric: He has a normal mood and affect. His behavior is normal.   Nursing note and vitals reviewed. ASSESSMENT and PLAN  Diagnoses and all orders for this visit:    1. Essential hypertension  -     METABOLIC PANEL, COMPREHENSIVE; Future  -     CBC WITH AUTOMATED DIFF; Future    2. Pure hypercholesterolemia  -     LIPID PANEL; Future  -     METABOLIC PANEL, COMPREHENSIVE; Future    3. Venous insufficiency of both lower extremities    4. Medicare annual wellness visit, subsequent    Other orders  -     losartan (COZAAR) 50 mg tablet; TAKE 1 TABLET DAILY  -     simvastatin (ZOCOR) 40 mg tablet; TAKE 1 TABLET NIGHTLY  -     diclofenac EC (VOLTAREN) 75 mg EC tablet; TAKE 1 TABLET DAILY      Follow-up Disposition:  Return in about 6 months (around 7/11/2019).    lab results and schedule of future lab studies reviewed with patient  reviewed diet, exercise and weight control  reviewed medications and side effects in detail  F/u with other MD's as scheduled  Overall stable

## 2019-07-10 DIAGNOSIS — E78.00 PURE HYPERCHOLESTEROLEMIA: ICD-10-CM

## 2019-07-10 DIAGNOSIS — I10 ESSENTIAL HYPERTENSION: ICD-10-CM

## 2019-07-12 ENCOUNTER — HOSPITAL ENCOUNTER (OUTPATIENT)
Dept: LAB | Age: 74
Discharge: HOME | End: 2019-07-12
Payer: MEDICARE

## 2019-07-12 ENCOUNTER — OFFICE VISIT (OUTPATIENT)
Dept: INTERNAL MEDICINE CLINIC | Age: 74
End: 2019-07-12

## 2019-07-12 DIAGNOSIS — R39.14 BENIGN PROSTATIC HYPERPLASIA WITH INCOMPLETE BLADDER EMPTYING: ICD-10-CM

## 2019-07-12 DIAGNOSIS — N40.1 BENIGN PROSTATIC HYPERPLASIA WITH INCOMPLETE BLADDER EMPTYING: ICD-10-CM

## 2019-07-12 DIAGNOSIS — E78.00 PURE HYPERCHOLESTEROLEMIA: ICD-10-CM

## 2019-07-12 DIAGNOSIS — I87.2 VENOUS INSUFFICIENCY OF BOTH LOWER EXTREMITIES: ICD-10-CM

## 2019-07-12 DIAGNOSIS — M25.432 WRIST SWELLING, LEFT: ICD-10-CM

## 2019-07-12 DIAGNOSIS — I10 ESSENTIAL HYPERTENSION: Primary | ICD-10-CM

## 2019-07-12 PROCEDURE — 85027 COMPLETE CBC AUTOMATED: CPT

## 2019-07-12 PROCEDURE — 36415 COLL VENOUS BLD VENIPUNCTURE: CPT

## 2019-07-12 PROCEDURE — 80061 LIPID PANEL: CPT

## 2019-07-12 PROCEDURE — 80053 COMPREHEN METABOLIC PANEL: CPT

## 2019-07-12 NOTE — PROGRESS NOTES
Health Maintenance Due   Topic Date Due    Shingrix Vaccine Age 49> (1 of 2) 07/17/1995    DTaP/Tdap/Td series (1 - Tdap) 12/14/2011    Pneumococcal 65+ years (2 of 2 - PPSV23) 12/13/2016    GLAUCOMA SCREENING Q2Y  04/08/2018       Chief Complaint   Patient presents with    Hypertension     6 month follow up    Benign Prostatic Hypertrophy    Cholesterol Problem       1. Have you been to the ER, urgent care clinic since your last visit? Hospitalized since your last visit? No    2. Have you seen or consulted any other health care providers outside of the [address] [place] [address] since your last visit? Include any pap smears or colon screening. No    3) Do you have an Advance Directive on file? yes    4) Are you interested in receiving information on Advance Directives? NO      Patient is accompanied by self I have received verbal consent from [address] [place] [address] to discuss any/all medical information while they are present in the room.

## 2019-07-13 LAB
ALBUMIN SERPL-MCNC: 4.3 G/DL (ref 3.5–4.8)
ALBUMIN/GLOB SERPL: 1.9 {RATIO} (ref 1.2–2.2)
ALP SERPL-CCNC: 74 IU/L (ref 39–117)
ALT SERPL-CCNC: 30 IU/L (ref 0–44)
AST SERPL-CCNC: 19 IU/L (ref 0–40)
BILIRUB SERPL-MCNC: 0.6 MG/DL (ref 0–1.2)
BUN SERPL-MCNC: 28 MG/DL (ref 8–27)
BUN/CREAT SERPL: 31 (ref 10–24)
CALCIUM SERPL-MCNC: 9.8 MG/DL (ref 8.6–10.2)
CHLORIDE SERPL-SCNC: 106 MMOL/L (ref 96–106)
CHOLEST SERPL-MCNC: 156 MG/DL (ref 100–199)
CO2 SERPL-SCNC: 24 MMOL/L (ref 20–29)
CREAT SERPL-MCNC: 0.89 MG/DL (ref 0.76–1.27)
ERYTHROCYTE [DISTWIDTH] IN BLOOD BY AUTOMATED COUNT: 14 % (ref 12.3–15.4)
GLOBULIN SER CALC-MCNC: 2.3 G/DL (ref 1.5–4.5)
GLUCOSE SERPL-MCNC: 96 MG/DL (ref 65–99)
HCT VFR BLD AUTO: 43.8 % (ref 37.5–51)
HDLC SERPL-MCNC: 60 MG/DL
HGB BLD-MCNC: 13.9 G/DL (ref 13–17.7)
INTERPRETATION, 910389: NORMAL
LDLC SERPL CALC-MCNC: 81 MG/DL (ref 0–99)
MCH RBC QN AUTO: 29.3 PG (ref 26.6–33)
MCHC RBC AUTO-ENTMCNC: 31.7 G/DL (ref 31.5–35.7)
MCV RBC AUTO: 92 FL (ref 79–97)
PLATELET # BLD AUTO: 225 X10E3/UL (ref 150–450)
POTASSIUM SERPL-SCNC: 4.7 MMOL/L (ref 3.5–5.2)
PROT SERPL-MCNC: 6.6 G/DL (ref 6–8.5)
RBC # BLD AUTO: 4.75 X10E6/UL (ref 4.14–5.8)
SODIUM SERPL-SCNC: 143 MMOL/L (ref 134–144)
TRIGL SERPL-MCNC: 74 MG/DL (ref 0–149)
VLDLC SERPL CALC-MCNC: 15 MG/DL (ref 5–40)
WBC # BLD AUTO: 9.5 X10E3/UL (ref 3.4–10.8)

## 2019-07-30 VITALS
BODY MASS INDEX: 26.34 KG/M2 | TEMPERATURE: 97.3 F | WEIGHT: 184 LBS | OXYGEN SATURATION: 97 % | SYSTOLIC BLOOD PRESSURE: 140 MMHG | HEIGHT: 70 IN | HEART RATE: 78 BPM | DIASTOLIC BLOOD PRESSURE: 88 MMHG | RESPIRATION RATE: 20 BRPM

## 2019-07-31 NOTE — PROGRESS NOTES
HISTORY OF PRESENT ILLNESS  Tash Burch is a 76 y.o. male. Pt. comes in for f/u. Has multiple medical problems. BP is stable. Reports recent swelling of left wrist.  Plays a lot of golf. Does not recall any obvious trauma. Has chronic venous insufficiency which has been stable. Has chronic BPH symptoms. No smoking. Drinks alcohol socially. Reports compliance with medications and diet. Med list and most recent labs/studies reviewed with pt. Trying to be active physically to control weight. Due for repeat labs. Reports no other new c/o. HPI    Review of Systems   Constitutional: Negative. HENT: Negative. Eyes: Negative for blurred vision. Respiratory: Negative for shortness of breath. Cardiovascular: Positive for leg swelling. Negative for chest pain. Gastrointestinal: Negative for abdominal pain. Genitourinary: Positive for urgency. Negative for dysuria. Musculoskeletal: Positive for joint pain. Negative for falls. Skin: Negative. Neurological: Negative for dizziness, sensory change, focal weakness and headaches. Endo/Heme/Allergies: Negative. Psychiatric/Behavioral: Negative. All other systems reviewed and are negative. Physical Exam   Constitutional: He is oriented to person, place, and time. He appears well-developed and well-nourished. No distress. pleasant   HENT:   Head: Normocephalic and atraumatic. Mouth/Throat: Oropharynx is clear and moist.   Eyes: Conjunctivae are normal. No scleral icterus. Neck: Normal range of motion. Neck supple. No JVD present. No thyromegaly present. Cardiovascular: Normal rate, regular rhythm, normal heart sounds and intact distal pulses. No murmur heard. Pulmonary/Chest: Effort normal and breath sounds normal. No respiratory distress. He has no wheezes. He has no rales. Abdominal: Soft. Bowel sounds are normal. He exhibits no distension. There is no tenderness.    Musculoskeletal: He exhibits edema (L pedal with skin changes,chronic, stable). He exhibits no tenderness. djd  L knee surgical scar   Neurological: He is alert and oriented to person, place, and time. Coordination normal.   Skin: Skin is warm and dry. No rash noted. Psychiatric: He has a normal mood and affect. His behavior is normal.   Nursing note and vitals reviewed. ASSESSMENT and PLAN  Diagnoses and all orders for this visit:    1. Essential hypertension  -     LIPID PANEL  -     METABOLIC PANEL, COMPREHENSIVE  -     CBC W/O DIFF    2. Pure hypercholesterolemia  -     LIPID PANEL  -     METABOLIC PANEL, COMPREHENSIVE    3. Venous insufficiency of both lower extremities    4. Benign prostatic hyperplasia with incomplete bladder emptying    5. Wrist swelling, left    Other orders  -     CVD REPORT      Follow-up and Dispositions    · Return in about 6 months (around 1/12/2020).      lab results and schedule of future lab studies reviewed with patient  reviewed diet, exercise and weight control  reviewed medications and side effects in detail  F/u with other MD's as scheduled  Overall stable

## 2019-09-05 ENCOUNTER — HOSPITAL ENCOUNTER (OUTPATIENT)
Dept: MRI IMAGING | Age: 74
Discharge: HOME | End: 2019-09-05
Attending: ORTHOPAEDIC SURGERY
Payer: MEDICARE

## 2019-09-05 DIAGNOSIS — S63.599A: ICD-10-CM

## 2019-09-05 PROCEDURE — 73221 MRI JOINT UPR EXTREM W/O DYE: CPT

## 2020-01-10 ENCOUNTER — OFFICE VISIT (OUTPATIENT)
Dept: INTERNAL MEDICINE CLINIC | Age: 75
End: 2020-01-10

## 2020-01-10 VITALS
HEART RATE: 85 BPM | TEMPERATURE: 98.3 F | SYSTOLIC BLOOD PRESSURE: 124 MMHG | WEIGHT: 182 LBS | BODY MASS INDEX: 26.05 KG/M2 | DIASTOLIC BLOOD PRESSURE: 78 MMHG | HEIGHT: 70 IN | OXYGEN SATURATION: 98 % | RESPIRATION RATE: 18 BRPM

## 2020-01-10 DIAGNOSIS — E78.00 PURE HYPERCHOLESTEROLEMIA: ICD-10-CM

## 2020-01-10 DIAGNOSIS — I87.2 VENOUS INSUFFICIENCY OF BOTH LOWER EXTREMITIES: ICD-10-CM

## 2020-01-10 DIAGNOSIS — M79.89 CALF SWELLING: ICD-10-CM

## 2020-01-10 DIAGNOSIS — I10 ESSENTIAL HYPERTENSION: Primary | ICD-10-CM

## 2020-01-10 DIAGNOSIS — R39.14 BENIGN PROSTATIC HYPERPLASIA WITH INCOMPLETE BLADDER EMPTYING: ICD-10-CM

## 2020-01-10 DIAGNOSIS — R73.9 HYPERGLYCEMIA: ICD-10-CM

## 2020-01-10 DIAGNOSIS — Z00.00 MEDICARE ANNUAL WELLNESS VISIT, SUBSEQUENT: ICD-10-CM

## 2020-01-10 DIAGNOSIS — N40.1 BENIGN PROSTATIC HYPERPLASIA WITH INCOMPLETE BLADDER EMPTYING: ICD-10-CM

## 2020-01-10 RX ORDER — LOSARTAN POTASSIUM 50 MG/1
TABLET ORAL
Qty: 90 TAB | Refills: 3 | Status: SHIPPED | OUTPATIENT
Start: 2020-01-10 | End: 2020-11-20

## 2020-01-10 RX ORDER — SIMVASTATIN 40 MG/1
TABLET, FILM COATED ORAL
Qty: 90 TAB | Refills: 3 | Status: SHIPPED | OUTPATIENT
Start: 2020-01-10 | End: 2021-01-15 | Stop reason: SDUPTHER

## 2020-01-10 RX ORDER — DICLOFENAC SODIUM 75 MG/1
TABLET, DELAYED RELEASE ORAL
Qty: 90 TAB | Refills: 3 | Status: SHIPPED | OUTPATIENT
Start: 2020-01-10 | End: 2021-01-15 | Stop reason: SDUPTHER

## 2020-01-10 NOTE — PROGRESS NOTES
This is the Subsequent Medicare Annual Wellness Exam, performed 12 months or more after the Initial AWV or the last Subsequent AWV    I have reviewed the patient's medical history in detail and updated the computerized patient record. History     Patient Active Problem List   Diagnosis Code    HTN (hypertension) I10    Pure hypercholesterolemia E78.00    Retinal detachment H33.20    DJD (degenerative joint disease) M19.90    ED (erectile dysfunction) N52.9    Hypotestosteronism E34.9    Venous insufficiency of leg I87.2    S/P TKR (total knee replacement) Z96.659    BPH (benign prostatic hyperplasia) N40.0    ACE inhibitor-aggravated angioedema T78. 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    Seborrheic dermatitis L21.9    Hyperglycemia R73.9    ACP (advance care planning) Z71.89    Trigger index finger of left hand M65.322    Wrist swelling, left M25.432     Past Medical History:   Diagnosis Date    Arthritis     knee    ED (erectile dysfunction)     Hemorrhoids     Hypercholesterolemia     Hypertension     Other ill-defined conditions(799.89)     hx phlebitis left leg      Past Surgical History:   Procedure Laterality Date    HX CATARACT REMOVAL      bilateral then bilateral laser surgery to open back of eye to let more light in    HX HERNIA REPAIR      inguinal hernia repair    HX ORTHOPAEDIC      8 left knee surgeries - arthroscopies/ACL repair/knee replacement    HX RETINAL DETACHMENT REPAIR      2 right eye/1 left eye to repair retina and macula    HX TONSILLECTOMY      HX VASECTOMY       Current Outpatient Medications   Medication Sig Dispense Refill    losartan (COZAAR) 50 mg tablet TAKE 1 TABLET DAILY 90 Tab 3    simvastatin (ZOCOR) 40 mg tablet TAKE 1 TABLET NIGHTLY 90 Tab 3    diclofenac EC (VOLTAREN) 75 mg EC tablet TAKE 1 TABLET DAILY 90 Tab 3    tamsulosin (FLOMAX) 0.4 mg capsule       tolterodine (DETROL) 2 mg tablet Take 2 mg by mouth daily.       aspirin delayed-release 81 mg tablet Take 81 mg by mouth daily.  Omega-3-DHA-EPA-Fish Oil 1,000 (120-180) mg Cap Take 1,200 mg by mouth daily.  PV W-O MILLICENT/FERROUS FUMARATE/FA (M-VIT PO) Take 1 Tab by mouth. Takes one multivitamin po daily. Does not have iron. Allergies   Allergen Reactions    Bactrim [Sulfamethoprim Ds] Hives and Other (comments)     Nausea,sweating,cold sweats    Pcn [Penicillins] Swelling     Throat and face    Lisinopril Angioedema       Family History   Problem Relation Age of Onset    Lung Disease Mother     No Known Problems Father      Social History     Tobacco Use    Smoking status: Current Some Day Smoker     Types: Cigars    Smokeless tobacco: Never Used    Tobacco comment: occasional cigar   Substance Use Topics    Alcohol use: Yes     Alcohol/week: 0.8 standard drinks     Types: 1 Glasses of wine per week       Depression Risk Factor Screening:     3 most recent PHQ Screens 1/10/2020   Little interest or pleasure in doing things Not at all   Feeling down, depressed, irritable, or hopeless Not at all   Total Score PHQ 2 0       Alcohol Risk Factor Screening (MALE > 65): Do you average more 1 drink per night or more than 7 drinks a week: No    In the past three months have you have had more than 4 drinks containing alcohol on one occasion: No      Functional Ability and Level of Safety:   Hearing: Hearing is good. Activities of Daily Living: The home contains: no safety equipment. Patient does total self care    Ambulation: with no difficulty    Fall Risk:  Fall Risk Assessment, last 12 mths 1/10/2020   Able to walk? Yes   Fall in past 12 months?  No       Abuse Screen:  Patient is not abused    Cognitive Screening   Has your family/caregiver stated any concerns about your memory: no  Cognitive Screening: A+O x 3    Patient Care Team   Patient Care Team:  Dena Franco DO as PCP - General  Dena Franco DO as [facility] - [facility] Empaneled Provider  Usama Carbajal MD (Ophthalmology)  Silva Nieves MD (Gastroenterology)  Zora Butler, RN as [facility] (Internal Medicine)  Andrew Chandler as Physician (Optometry)    Assessment/Plan   Education and counseling provided:  Are appropriate based on today's review and evaluation          Health Maintenance Due   Topic Date Due    DTaP/Tdap/Td series (1 - Tdap) 07/17/1956    Shingrix Vaccine Age 50> (1 of 2) 07/17/1995    Influenza Age 5 to Adult  08/01/2019

## 2020-01-10 NOTE — PROGRESS NOTES
Schedule of Personalized Health Plan  (Provide Copy to Patient)  The best way to stay healthy is to live a healthy lifestyle. A healthy lifestyle includes regular exercise, eating a well-balanced diet, keeping a healthy weight and not smoking. Regular physical exams and screening tests are another important way to take care of yourself. Preventive exams provided by health care providers can find health problems early when treatment works best and can keep you from getting certain diseases or illnesses. Preventive services include exams, lab tests, screenings, shots, monitoring and information to help you take care of your own health. All people over 65 should have a pneumonia shot. Pneumonia shots are usually only needed once in a lifetime unless your doctor decides differently. All people over 65 should have a yearly flu shot. People over 65 are at medium to high risk for Hepatitis B. Three shots are needed for complete protection. In addition to your physical exam, some screening tests are recommended:    Bone mass measurement (dexa scan) is recommended every two years if you have certain risk factors, such as personal history of vertebral fracture or chronic steroid medication use    Diabetes Mellitus screening is recommended every year. Glaucoma is an eye disease caused by high pressure in the eye. An eye exam is recommended every year. Cardiovascular screening tests that check your cholesterol and other blood fat (lipid) levels are recommended every five years. Colorectal Cancer screening tests help to find pre-cancerous polyps (growths in the colon) so they can be removed before they turn into cancer. Tests ordered for screening depend on your personal and family history risk factors.     Screening for Prostate Cancer is recommended yearly with a digital rectal exam and/or a PSA test    Here is a list of your current Health Maintenance items with a due date:  Health Maintenance Topic Date Due    DTaP/Tdap/Td series (1 - Tdap) 07/17/1956    Shingrix Vaccine Age 50> (1 of 2) 07/17/1995    Influenza Age 5 to Adult  08/01/2019    Pneumococcal 65+ years (2 of 2 - PPSV23) 08/06/2020    MEDICARE YEARLY EXAM  01/10/2021    GLAUCOMA SCREENING Q2Y  11/08/2021    COLONOSCOPY  02/15/2023    Hepatitis C Screening  Addressed

## 2020-01-10 NOTE — PROGRESS NOTES
Health Maintenance Due   Topic Date Due    DTaP/Tdap/Td series (1 - Tdap) 07/17/1956    Shingrix Vaccine Age 50> (1 of 2) 07/17/1995    Influenza Age 5 to Adult  08/01/2019    MEDICARE YEARLY EXAM  01/12/2020       Chief Complaint   Patient presents with    Complete Physical    Hypertension    Benign Prostatic Hypertrophy    Cholesterol Problem       1. Have you been to the ER, urgent care clinic since your last visit? Hospitalized since your last visit? No    2. Have you seen or consulted any other health care providers outside of the [address] [place] [address] since your last visit? Include any pap smears or colon screening. No    3) Do you have an Advance Directive on file? no    4) Are you interested in receiving information on Advance Directives? NO      Patient is accompanied by self I have received verbal consent from [address] [place] [address] to discuss any/all medical information while they are present in the room.

## 2020-01-20 NOTE — PROGRESS NOTES
HISTORY OF PRESENT ILLNESS  Patsy Linda is a 76 y.o. male. Pt. comes in for f/u. Has multiple medical problems. BP is stable. Reports  some swelling in her left calf. History of DVT. Denies any pain, redness, or other related symptoms. Has chronic venous insufficiency which has been stable. Has chronic BPH symptoms. No smoking. Drinks alcohol socially. Reports compliance with medications and diet. Med list and most recent labs/studies reviewed with pt. Trying to be active physically to control weight. Still exercising and playing golf on a regular basis. He is retired. Does a lot of volunteer work. Needs med refills. Reports no other new c/o. Complete Physical   Pertinent negatives include no chest pain, no abdominal pain, no headaches and no shortness of breath. Hypertension    Pertinent negatives include no chest pain, no blurred vision, no headaches, no dizziness and no shortness of breath. Cholesterol Problem   Pertinent negatives include no chest pain, no abdominal pain, no headaches and no shortness of breath. Review of Systems   Constitutional: Negative. HENT: Negative. Eyes: Negative for blurred vision. Respiratory: Negative for shortness of breath. Cardiovascular: Positive for leg swelling. Negative for chest pain. Gastrointestinal: Negative for abdominal pain. Genitourinary: Positive for urgency. Negative for dysuria. Musculoskeletal: Positive for joint pain. Negative for falls. Skin: Negative. Neurological: Negative for dizziness, sensory change, focal weakness and headaches. Endo/Heme/Allergies: Negative. Psychiatric/Behavioral: Negative. All other systems reviewed and are negative. Physical Exam  Vitals signs and nursing note reviewed. Constitutional:       General: He is not in acute distress. Appearance: He is well-developed. Comments: pleasant   HENT:      Head: Normocephalic and atraumatic.    Eyes:      General: No scleral icterus. Conjunctiva/sclera: Conjunctivae normal.   Neck:      Musculoskeletal: Normal range of motion and neck supple. Thyroid: No thyromegaly. Vascular: No JVD. Cardiovascular:      Rate and Rhythm: Normal rate and regular rhythm. Heart sounds: Normal heart sounds. No murmur. Pulmonary:      Effort: Pulmonary effort is normal. No respiratory distress. Breath sounds: Normal breath sounds. No wheezing or rales. Abdominal:      General: Bowel sounds are normal. There is no distension. Palpations: Abdomen is soft. Tenderness: There is no tenderness. Musculoskeletal:         General: No tenderness. Comments: djd  L knee surgical scar   Skin:     General: Skin is warm and dry. Findings: No rash. Neurological:      Mental Status: He is alert and oriented to person, place, and time. Coordination: Coordination normal.   Psychiatric:         Behavior: Behavior normal.         ASSESSMENT and PLAN  Diagnoses and all orders for this visit:    1. Essential hypertension  -     LIPID PANEL; Future  -     METABOLIC PANEL, COMPREHENSIVE; Future  -     CBC W/O DIFF; Future    2. Pure hypercholesterolemia  -     LIPID PANEL; Future  -     METABOLIC PANEL, COMPREHENSIVE; Future  -     CBC W/O DIFF; Future    3. Venous insufficiency of both lower extremities    4. Benign prostatic hyperplasia with incomplete bladder emptying    5. Medicare annual wellness visit, subsequent    6. Hyperglycemia  -     METABOLIC PANEL, COMPREHENSIVE; Future  -     HEMOGLOBIN A1C WITH EAG; Future    7. Calf swelling    Other orders  -     diclofenac EC (VOLTAREN) 75 mg EC tablet; TAKE 1 TABLET DAILY  -     losartan (COZAAR) 50 mg tablet; TAKE 1 TABLET DAILY  -     simvastatin (ZOCOR) 40 mg tablet; TAKE 1 TABLET NIGHTLY      Follow-up and Dispositions    · Return in about 6 months (around 7/10/2020).      lab results and schedule of future lab studies reviewed with patient  reviewed diet, exercise and weight control  reviewed medications and side effects in detail  F/u with other MD's as scheduled  Overall stable

## 2020-06-29 LAB
ALBUMIN SERPL-MCNC: 4.2 G/DL (ref 3.7–4.7)
ALBUMIN/GLOB SERPL: 1.8 {RATIO} (ref 1.2–2.2)
ALP SERPL-CCNC: 66 IU/L (ref 39–117)
ALT SERPL-CCNC: 28 IU/L (ref 0–44)
AST SERPL-CCNC: 23 IU/L (ref 0–40)
BASOPHILS # BLD AUTO: 0.1 X10E3/UL (ref 0–0.2)
BASOPHILS NFR BLD AUTO: 1 %
BILIRUB SERPL-MCNC: 0.5 MG/DL (ref 0–1.2)
BUN SERPL-MCNC: 22 MG/DL (ref 8–27)
BUN/CREAT SERPL: 27 (ref 10–24)
CALCIUM SERPL-MCNC: 9.6 MG/DL (ref 8.6–10.2)
CHLORIDE SERPL-SCNC: 107 MMOL/L (ref 96–106)
CHOLEST SERPL-MCNC: 159 MG/DL (ref 100–199)
CO2 SERPL-SCNC: 24 MMOL/L (ref 20–29)
CREAT SERPL-MCNC: 0.83 MG/DL (ref 0.76–1.27)
EOSINOPHIL # BLD AUTO: 0.3 X10E3/UL (ref 0–0.4)
EOSINOPHIL NFR BLD AUTO: 4 %
ERYTHROCYTE [DISTWIDTH] IN BLOOD BY AUTOMATED COUNT: 12.7 % (ref 11.6–15.4)
EST. AVERAGE GLUCOSE BLD GHB EST-MCNC: 108 MG/DL
GLOBULIN SER CALC-MCNC: 2.3 G/DL (ref 1.5–4.5)
GLUCOSE SERPL-MCNC: 104 MG/DL (ref 65–99)
HBA1C MFR BLD: 5.4 % (ref 4.8–5.6)
HCT VFR BLD AUTO: 41.5 % (ref 37.5–51)
HDLC SERPL-MCNC: 62 MG/DL
HGB BLD-MCNC: 14 G/DL (ref 13–17.7)
IMM GRANULOCYTES # BLD AUTO: 0 X10E3/UL (ref 0–0.1)
IMM GRANULOCYTES NFR BLD AUTO: 0 %
INTERPRETATION, 910389: NORMAL
LDLC SERPL CALC-MCNC: 81 MG/DL (ref 0–99)
LYMPHOCYTES # BLD AUTO: 2.3 X10E3/UL (ref 0.7–3.1)
LYMPHOCYTES NFR BLD AUTO: 38 %
MCH RBC QN AUTO: 30.2 PG (ref 26.6–33)
MCHC RBC AUTO-ENTMCNC: 33.7 G/DL (ref 31.5–35.7)
MCV RBC AUTO: 90 FL (ref 79–97)
MONOCYTES # BLD AUTO: 0.4 X10E3/UL (ref 0.1–0.9)
MONOCYTES NFR BLD AUTO: 7 %
NEUTROPHILS # BLD AUTO: 3 X10E3/UL (ref 1.4–7)
NEUTROPHILS NFR BLD AUTO: 50 %
PLATELET # BLD AUTO: 214 X10E3/UL (ref 150–450)
POTASSIUM SERPL-SCNC: 4.6 MMOL/L (ref 3.5–5.2)
PROT SERPL-MCNC: 6.5 G/DL (ref 6–8.5)
RBC # BLD AUTO: 4.63 X10E6/UL (ref 4.14–5.8)
SODIUM SERPL-SCNC: 144 MMOL/L (ref 134–144)
TRIGL SERPL-MCNC: 81 MG/DL (ref 0–149)
VLDLC SERPL CALC-MCNC: 16 MG/DL (ref 5–40)
WBC # BLD AUTO: 6.1 X10E3/UL (ref 3.4–10.8)

## 2020-07-09 DIAGNOSIS — R73.9 HYPERGLYCEMIA: ICD-10-CM

## 2020-07-09 DIAGNOSIS — I10 ESSENTIAL HYPERTENSION: ICD-10-CM

## 2020-07-09 DIAGNOSIS — E78.00 PURE HYPERCHOLESTEROLEMIA: ICD-10-CM

## 2020-07-13 ENCOUNTER — VIRTUAL VISIT (OUTPATIENT)
Dept: INTERNAL MEDICINE CLINIC | Age: 75
End: 2020-07-13

## 2020-07-13 DIAGNOSIS — I87.2 VENOUS INSUFFICIENCY OF BOTH LOWER EXTREMITIES: ICD-10-CM

## 2020-07-13 DIAGNOSIS — N40.1 BENIGN PROSTATIC HYPERPLASIA WITH INCOMPLETE BLADDER EMPTYING: ICD-10-CM

## 2020-07-13 DIAGNOSIS — I10 ESSENTIAL HYPERTENSION: Primary | ICD-10-CM

## 2020-07-13 DIAGNOSIS — R39.14 BENIGN PROSTATIC HYPERPLASIA WITH INCOMPLETE BLADDER EMPTYING: ICD-10-CM

## 2020-07-13 DIAGNOSIS — E29.1 HYPOTESTOSTERONEMIA IN MALE: ICD-10-CM

## 2020-07-13 DIAGNOSIS — E78.00 PURE HYPERCHOLESTEROLEMIA: ICD-10-CM

## 2020-07-13 NOTE — PROGRESS NOTES
ADVISED PATIENT OF THE FOLLOWING HEALTH MAINTAINCE DUE  Health Maintenance Due   Topic Date Due    DTaP/Tdap/Td series (1 - Tdap) 07/[birth year]/1966      Chief Complaint   Patient presents with    Hypertension     6 month    Cholesterol Problem    Benign Prostatic Hypertrophy    Labs     review results        1. Have you been to the ER, urgent care clinic since your last visit? Hospitalized since your last visit? No    2. Have you seen or consulted any other health care providers outside of the [address] [place] [address] since your last visit? Include any DEXA scan, mammography  or colon screening. No    3. Do you have an Advance Directive on file? no    4. Do you have a DNR on file? npo    Patient is accompanied by self I have received verbal consent from [address] [place] [address] to discuss any/all medical information while they are present in the room. No flowsheet data found. [address] [place] [address]  Phone: [phone] Fax: [phone]    [facility] [address] [place] [address], [address] [place] [address]  Phone: [phone] Fax: [phone]        Patient reminded during visit to bring all medication bottles, OTC medications to all appointments.

## 2020-07-13 NOTE — PROGRESS NOTES
Massiel Henderson is a 76 y.o. male who was seen by synchronous (real-time) audio-video technology on 7/13/2020 for Hypertension (6 month); Cholesterol Problem; Benign Prostatic Hypertrophy; and Labs (review results )        Assessment & Plan:   Diagnoses and all orders for this visit:    1. Essential hypertension    2. Pure hypercholesterolemia  -     LIPID PANEL; Future  -     METABOLIC PANEL, COMPREHENSIVE; Future    3. Benign prostatic hyperplasia with incomplete bladder emptying    4. Venous insufficiency of both lower extremities    5. Hypotestosteronemia in male  -     TESTOSTERONE, FREE & TOTAL; Future        I spent at least 25 minutes on this visit with this established patient. Subjective:     Pt. is seen virtually for f/u. Has a few chronic medical issues as documented. Reports BP being stable with medications. Has some BPH symptoms but medications help. His chronic pedal edema/venous insufficiency has been stable. Continues have issues with ED. Different medications did not help. Testosterone has been low. His chronic arthritic pains especially knees have been stable. Still active physically and playing golf. Taking precautions for Covid 19. Denies any related signs or symptoms including fever, cough, SOB or CP. PMH/PSH/Allergies/Social History/medication list and most recent studies reviewed with patient. Tobacco use: No  Alcohol use: Social    Reports compliance with medications and diet. Trying to be active physically to control weight. Reports no other new c/o. Plan:  Continue current medications  Repeat labs  Remain active physically and watch diet  F/u with other MD's as scheduled  COVID-19 precautions discussed with pt  Follow-up 6 months or as needed  Prior to Admission medications    Medication Sig Start Date End Date Taking?  Authorizing Provider   diclofenac EC (VOLTAREN) 75 mg EC tablet TAKE 1 TABLET DAILY 1/10/20  Yes Jaron Haney,    losartan (COZAAR) 50 mg tablet TAKE 1 TABLET DAILY 1/10/20  Yes Jaron Haney,    simvastatin (ZOCOR) 40 mg tablet TAKE 1 TABLET NIGHTLY 1/10/20  Yes Jaron Haney DO   tamsulosin (FLOMAX) 0.4 mg capsule  4/18/18  Yes Provider, Historical   tolterodine (DETROL) 2 mg tablet Take 2 mg by mouth daily. Yes Provider, Historical   aspirin delayed-release 81 mg tablet Take 81 mg by mouth daily. Yes Provider, Historical   Omega-3-DHA-EPA-Fish Oil 1,000 (120-180) mg Cap Take 1,200 mg by mouth daily. Yes Provider, Historical   PV W-O MILLICENT/FERROUS FUMARATE/FA (M-VIT PO) Take 1 Tab by mouth. Takes one multivitamin po daily. Does not have iron. 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    BPH (benign prostatic hyperplasia) 06/16/2014    S/P TKR (total knee replacement) 09/17/2013    Venous insufficiency of leg 08/27/2013    Hypotestosteronemia in male 06/14/2013    HTN (hypertension) 12/13/2010    Pure hypercholesterolemia 12/13/2010    Retinal detachment 12/13/2010    DJD (degenerative joint disease) 12/13/2010    ED (erectile dysfunction) 12/13/2010     Current Outpatient Medications   Medication Sig Dispense Refill    diclofenac EC (VOLTAREN) 75 mg EC tablet TAKE 1 TABLET DAILY 90 Tab 3    losartan (COZAAR) 50 mg tablet TAKE 1 TABLET DAILY 90 Tab 3    simvastatin (ZOCOR) 40 mg tablet TAKE 1 TABLET NIGHTLY 90 Tab 3    tamsulosin (FLOMAX) 0.4 mg capsule       tolterodine (DETROL) 2 mg tablet Take 2 mg by mouth daily.  aspirin delayed-release 81 mg tablet Take 81 mg by mouth daily.  Omega-3-DHA-EPA-Fish Oil 1,000 (120-180) mg Cap Take 1,200 mg by mouth daily.  PV W-O MILLICENT/FERROUS FUMARATE/FA (M-VIT PO) Take 1 Tab by mouth.  Takes one multivitamin po daily. Does not have iron. Allergies   Allergen Reactions    Bactrim [Sulfamethoprim Ds] Hives and Other (comments)     Nausea,sweating,cold sweats    Penicillins Swelling     Throat and face  Throat and face    Sulfamethoxazole-Trimethoprim Hives and Unknown (comments)     Nausea,sweating,cold sweats    Lisinopril Angioedema and Swelling     Past Medical History:   Diagnosis Date    Arthritis     knee    ED (erectile dysfunction)     Hemorrhoids     Hypercholesterolemia     Hypertension     Other ill-defined conditions(799.89)     hx phlebitis left leg     Past Surgical History:   Procedure Laterality Date    HX CATARACT REMOVAL      bilateral then bilateral laser surgery to open back of eye to let more light in    HX HERNIA REPAIR      inguinal hernia repair    HX ORTHOPAEDIC      8 left knee surgeries - arthroscopies/ACL repair/knee replacement    HX RETINAL DETACHMENT REPAIR      2 right eye/1 left eye to repair retina and macula    HX TONSILLECTOMY      HX VASECTOMY       Social History     Tobacco Use    Smoking status: Current Some Day Smoker     Types: Cigars    Smokeless tobacco: Never Used    Tobacco comment: occasional cigar   Substance Use Topics    Alcohol use:  Yes     Alcohol/week: 0.8 standard drinks     Types: 1 Glasses of wine per week       ROS    Objective:     Patient-Reported Vitals 7/13/2020   Patient-Reported Height 5f10        [INSTRUCTIONS:  \"[x]\" Indicates a positive item  \"[]\" Indicates a negative item  -- DELETE ALL ITEMS NOT EXAMINED]    Constitutional: [x] Appears well-developed and well-nourished [x] No apparent distress      [] Abnormal -     Mental status: [x] Alert and awake  [x] Oriented to person/place/time [x] Able to follow commands    [] Abnormal -     Eyes:   EOM    [x]  Normal    [] Abnormal -   Sclera  [x]  Normal    [] Abnormal -          Discharge [x]  None visible   [] Abnormal -     HENT: [x] Normocephalic, atraumatic  [] Abnormal -   [x] Mouth/Throat: Mucous membranes are moist    External Ears [x] Normal  [] Abnormal -    Neck: [x] No visualized mass [] Abnormal -     Pulmonary/Chest: [x] Respiratory effort normal   [x] No visualized signs of difficulty breathing or respiratory distress        [] Abnormal -      Musculoskeletal:   [x] Normal gait with no signs of ataxia         [x] Normal range of motion of neck        [] Abnormal -     Neurological:        [x] No Facial Asymmetry (Cranial nerve 7 motor function) (limited exam due to video visit)          [x] No gaze palsy        [] Abnormal -          Skin:        [x] No significant exanthematous lesions or discoloration noted on facial skin         [] Abnormal -            Psychiatric:       [x] Normal Affect [] Abnormal -        [x] No Hallucinations    Other pertinent observable physical exam findings:-        We discussed the expected course, resolution and complications of the diagnosis(es) in detail. Medication risks, benefits, costs, interactions, and alternatives were discussed as indicated. I advised him to contact the office if his condition worsens, changes or fails to improve as anticipated. He expressed understanding with the diagnosis(es) and plan. Leopoldo Fridge, who was evaluated through a patient-initiated, synchronous (real-time) audio-video encounter, and/or his healthcare decision maker, is aware that it is a billable service, with coverage as determined by his insurance carrier. He provided verbal consent to proceed: Yes, and patient identification was verified. It was conducted pursuant to the emergency declaration under the [address] [place] [address] and the [employer] and [employer] General Act. A caregiver was present when appropriate. Ability to conduct physical exam was limited. I was at home. The patient was at home.       Lian Hernandez DO

## 2021-01-08 ENCOUNTER — HOSPITAL ENCOUNTER (OUTPATIENT)
Dept: LAB | Age: 76
Discharge: HOME | End: 2021-01-08
Payer: MEDICARE

## 2021-01-08 PROCEDURE — 80053 COMPREHEN METABOLIC PANEL: CPT

## 2021-01-08 PROCEDURE — 36415 COLL VENOUS BLD VENIPUNCTURE: CPT

## 2021-01-08 PROCEDURE — 80061 LIPID PANEL: CPT

## 2021-01-08 PROCEDURE — 84403 ASSAY OF TOTAL TESTOSTERONE: CPT

## 2021-01-09 DIAGNOSIS — E78.00 PURE HYPERCHOLESTEROLEMIA: ICD-10-CM

## 2021-01-10 LAB
ALBUMIN SERPL-MCNC: 4.2 G/DL (ref 3.7–4.7)
ALBUMIN/GLOB SERPL: 1.9 {RATIO} (ref 1.2–2.2)
ALP SERPL-CCNC: 80 IU/L (ref 39–117)
ALT SERPL-CCNC: 24 IU/L (ref 0–44)
AST SERPL-CCNC: 21 IU/L (ref 0–40)
BILIRUB SERPL-MCNC: 0.5 MG/DL (ref 0–1.2)
BUN SERPL-MCNC: 14 MG/DL (ref 8–27)
BUN/CREAT SERPL: 14 (ref 10–24)
CALCIUM SERPL-MCNC: 9.7 MG/DL (ref 8.6–10.2)
CHLORIDE SERPL-SCNC: 106 MMOL/L (ref 96–106)
CHOLEST SERPL-MCNC: 157 MG/DL (ref 100–199)
CO2 SERPL-SCNC: 24 MMOL/L (ref 20–29)
CREAT SERPL-MCNC: 0.97 MG/DL (ref 0.76–1.27)
GLOBULIN SER CALC-MCNC: 2.2 G/DL (ref 1.5–4.5)
GLUCOSE SERPL-MCNC: 97 MG/DL (ref 65–99)
HDLC SERPL-MCNC: 58 MG/DL
INTERPRETATION, 910389: NORMAL
LDLC SERPL CALC-MCNC: 82 MG/DL (ref 0–99)
POTASSIUM SERPL-SCNC: 4.5 MMOL/L (ref 3.5–5.2)
PROT SERPL-MCNC: 6.4 G/DL (ref 6–8.5)
SODIUM SERPL-SCNC: 141 MMOL/L (ref 134–144)
TESTOST FREE SERPL-MCNC: 7.1 PG/ML (ref 6.6–18.1)
TESTOST SERPL-MCNC: 365 NG/DL (ref 264–916)
TRIGL SERPL-MCNC: 89 MG/DL (ref 0–149)
VLDLC SERPL CALC-MCNC: 17 MG/DL (ref 5–40)

## 2021-01-13 DIAGNOSIS — E29.1 HYPOTESTOSTERONEMIA IN MALE: ICD-10-CM

## 2021-01-14 NOTE — PROGRESS NOTES
VV:  [phone] Phone Call    Identified pt with two pt identifiers(name and ). Reviewed record in preparation for visit and have obtained necessary documentation. All patient medications has been reviewed. No chief complaint on file. Health Maintenance Due   Topic    DTaP/Tdap/Td series (1 - Tdap)    Flu Vaccine (1)    Medicare Yearly Exam      [facility]      There were no vitals filed for this visit. 4.Have you been to the ER, urgent care clinic since your last visit? Hospitalized since your last visit? No    5. Have you seen or consulted any other health care providers outside of the [address] [place] [address] since your last visit? Include any pap smears or colon screening. [facility]; [facility], Dr Blair Siegel    Patient is accompanied by self I have received verbal consent from [facility] to discuss any/all medical information while they are present in the room.

## 2021-01-15 ENCOUNTER — VIRTUAL VISIT (OUTPATIENT)
Dept: INTERNAL MEDICINE CLINIC | Age: 76
End: 2021-01-15
Payer: MEDICARE

## 2021-01-15 DIAGNOSIS — N52.9 ERECTILE DYSFUNCTION, UNSPECIFIED ERECTILE DYSFUNCTION TYPE: ICD-10-CM

## 2021-01-15 DIAGNOSIS — N40.1 BENIGN PROSTATIC HYPERPLASIA WITH INCOMPLETE BLADDER EMPTYING: ICD-10-CM

## 2021-01-15 DIAGNOSIS — I87.2 VENOUS INSUFFICIENCY OF BOTH LOWER EXTREMITIES: ICD-10-CM

## 2021-01-15 DIAGNOSIS — I10 ESSENTIAL HYPERTENSION: Primary | ICD-10-CM

## 2021-01-15 DIAGNOSIS — R39.14 BENIGN PROSTATIC HYPERPLASIA WITH INCOMPLETE BLADDER EMPTYING: ICD-10-CM

## 2021-01-15 DIAGNOSIS — Z00.00 MEDICARE ANNUAL WELLNESS VISIT, SUBSEQUENT: ICD-10-CM

## 2021-01-15 DIAGNOSIS — E78.00 PURE HYPERCHOLESTEROLEMIA: ICD-10-CM

## 2021-01-15 PROCEDURE — 1101F PT FALLS ASSESS-DOCD LE1/YR: CPT | Performed by: INTERNAL MEDICINE

## 2021-01-15 PROCEDURE — G0463 HOSPITAL OUTPT CLINIC VISIT: HCPCS | Performed by: INTERNAL MEDICINE

## 2021-01-15 PROCEDURE — G8536 NO DOC ELDER MAL SCRN: HCPCS | Performed by: INTERNAL MEDICINE

## 2021-01-15 PROCEDURE — G8421 BMI NOT CALCULATED: HCPCS | Performed by: INTERNAL MEDICINE

## 2021-01-15 PROCEDURE — G8756 NO BP MEASURE DOC: HCPCS | Performed by: INTERNAL MEDICINE

## 2021-01-15 PROCEDURE — 99214 OFFICE O/P EST MOD 30 MIN: CPT | Performed by: INTERNAL MEDICINE

## 2021-01-15 PROCEDURE — 3017F COLORECTAL CA SCREEN DOC REV: CPT | Performed by: INTERNAL MEDICINE

## 2021-01-15 PROCEDURE — G8427 DOCREV CUR MEDS BY ELIG CLIN: HCPCS | Performed by: INTERNAL MEDICINE

## 2021-01-15 PROCEDURE — G8432 DEP SCR NOT DOC, RNG: HCPCS | Performed by: INTERNAL MEDICINE

## 2021-01-15 PROCEDURE — G0439 PPPS, SUBSEQ VISIT: HCPCS | Performed by: INTERNAL MEDICINE

## 2021-01-15 RX ORDER — DICLOFENAC SODIUM 75 MG/1
TABLET, DELAYED RELEASE ORAL
Qty: 90 TAB | Refills: 1 | Status: SHIPPED | OUTPATIENT
Start: 2021-01-15 | End: 2021-07-13

## 2021-01-15 RX ORDER — LOSARTAN POTASSIUM 50 MG/1
TABLET ORAL
Qty: 90 TAB | Refills: 1 | Status: SHIPPED | OUTPATIENT
Start: 2021-01-15 | End: 2021-07-13 | Stop reason: SDUPTHER

## 2021-01-15 RX ORDER — SIMVASTATIN 40 MG/1
TABLET, FILM COATED ORAL
Qty: 90 TAB | Refills: 1 | Status: SHIPPED | OUTPATIENT
Start: 2021-01-15 | End: 2021-07-13 | Stop reason: SDUPTHER

## 2021-01-15 NOTE — PROGRESS NOTES
Oma Florence is a 76 y.o. male who was seen by synchronous (real-time) audio-video technology on 1/15/2021 for Follow Up Chronic Condition (Check up) and Hypertension        Assessment & Plan:   Diagnoses and all orders for this visit:    1. Essential hypertension  -     METABOLIC PANEL, BASIC; Future    2. Pure hypercholesterolemia  -     LIPID PANEL; Future  -     METABOLIC PANEL, BASIC; Future  -     ALT; Future  -     AST; Future    3. Benign prostatic hyperplasia with incomplete bladder emptying    4. Venous insufficiency of both lower extremities    5. Erectile dysfunction, unspecified erectile dysfunction type    6. Medicare annual wellness visit, subsequent    Other orders  -     diclofenac EC (VOLTAREN) 75 mg EC tablet; TAKE 1 TABLET DAILY  -     losartan (COZAAR) 50 mg tablet; TAKE 1 TABLET DAILY  -     simvastatin (ZOCOR) 40 mg tablet; TAKE 1 TABLET NIGHTLY        I spent at least 25 minutes on this visit with this established patient. Subjective:     Pt. is seen virtually for f/u. Has a few chronic medical issues as documented. His BP and HLD are stable on medications. No side effects. His chronic lower extremity edema has been stable. Has chronic issues with BPH and ED. Medications did not help much. Evaluated by urologist.  Mehdi Holland injections but not interested in continuation. Has chronic arthritic pain especially in knees. Taking precautions for Covid 19. Denies any related signs or symptoms including fever, cough, SOB or CP. PMH/PSH/Allergies/Social History/medication list and most recent studies reviewed with patient. Recent labs are stable. Tobacco use: No  Alcohol use: Social    Reports compliance with medications and diet. Trying to be active physically to control weight. Reports no other new c/o.     Plan:  Refill medications  Monitor BP at home with goal of 140/90 or less  Watch diet and remain active physically  Follow-up with other MDs/specialists as scheduled  COVID-19 precautions discussed with pt  Follow-up 6 months or as needed      Prior to Admission medications    Medication Sig Start Date End Date Taking? Authorizing Provider   diclofenac EC (VOLTAREN) 75 mg EC tablet TAKE 1 TABLET DAILY 1/15/21  Yes Jaron Haney DO   losartan (COZAAR) 50 mg tablet TAKE 1 TABLET DAILY 1/15/21  Yes Jaron Haney DO   simvastatin (ZOCOR) 40 mg tablet TAKE 1 TABLET NIGHTLY 1/15/21  Yes Jaron Haney DO   tamsulosin (FLOMAX) 0.4 mg capsule  4/18/18  Yes Provider, Historical   tolterodine (DETROL) 2 mg tablet Take 2 mg by mouth daily. Yes Provider, Historical   aspirin delayed-release 81 mg tablet Take 81 mg by mouth daily. Yes Provider, Historical   Omega-3-DHA-EPA-Fish Oil 1,000 (120-180) mg Cap Take 1,200 mg by mouth daily. Yes Provider, Historical   PV W-O MILLICENT/FERROUS FUMARATE/FA (M-VIT PO) Take 1 Tab by mouth. Takes one multivitamin po daily. Does not have iron.    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    BPH (benign prostatic hyperplasia) 06/16/2014    S/P TKR (total knee replacement) 09/17/2013    Venous insufficiency of leg 08/27/2013    Hypotestosteronemia in male 06/14/2013    HTN (hypertension) 12/13/2010    Pure hypercholesterolemia 12/13/2010    Retinal detachment 12/13/2010    DJD (degenerative joint disease) 12/13/2010    ED (erectile dysfunction) 12/13/2010     Current Outpatient Medications Medication Sig Dispense Refill    diclofenac EC (VOLTAREN) 75 mg EC tablet TAKE 1 TABLET DAILY 90 Tab 1    losartan (COZAAR) 50 mg tablet TAKE 1 TABLET DAILY 90 Tab 1    simvastatin (ZOCOR) 40 mg tablet TAKE 1 TABLET NIGHTLY 90 Tab 1    tamsulosin (FLOMAX) 0.4 mg capsule       tolterodine (DETROL) 2 mg tablet Take 2 mg by mouth daily.  aspirin delayed-release 81 mg tablet Take 81 mg by mouth daily.  Omega-3-DHA-EPA-Fish Oil 1,000 (120-180) mg Cap Take 1,200 mg by mouth daily.  PV W-O MILLICENT/FERROUS FUMARATE/FA (M-VIT PO) Take 1 Tab by mouth. Takes one multivitamin po daily. Does not have iron. Allergies   Allergen Reactions    Bactrim [Sulfamethoprim Ds] Hives and Other (comments)     Nausea,sweating,cold sweats    Penicillins Swelling     Throat and face  Throat and face    Sulfamethoxazole-Trimethoprim Hives and Unknown (comments)     Nausea,sweating,cold sweats    Lisinopril Angioedema and Swelling     Past Medical History:   Diagnosis Date    Arthritis     knee    ED (erectile dysfunction)     Hemorrhoids     Hypercholesterolemia     Hypertension     Other ill-defined conditions(213.53)     hx phlebitis left leg     Past Surgical History:   Procedure Laterality Date    HX CATARACT REMOVAL      bilateral then bilateral laser surgery to open back of eye to let more light in    HX HERNIA REPAIR      inguinal hernia repair    HX ORTHOPAEDIC      8 left knee surgeries - arthroscopies/ACL repair/knee replacement    HX RETINAL DETACHMENT REPAIR      2 right eye/1 left eye to repair retina and macula    HX TONSILLECTOMY      HX VASECTOMY       Social History     Tobacco Use    Smoking status: Current Some Day Smoker     Types: Cigars    Smokeless tobacco: Never Used    Tobacco comment: occasional cigar   Substance Use Topics    Alcohol use:  Yes     Alcohol/week: 0.8 standard drinks     Types: 1 Glasses of wine per week       ROS    Objective:     Patient-Reported Vitals 1/15/2021   Patient-Reported Weight 182 lbs   Patient-Reported Height -   Patient-Reported Pulse 78   Patient-Reported Temperature 97.3 temporal   Patient-Reported Systolic  628   Patient-Reported Diastolic 90        [INSTRUCTIONS:  \"[x]\" Indicates a positive item  \"[]\" Indicates a negative item  -- DELETE ALL ITEMS NOT EXAMINED]    Constitutional: [x] Appears well-developed and well-nourished [x] No apparent distress      [] Abnormal -     Mental status: [x] Alert and awake  [x] Oriented to person/place/time [x] Able to follow commands    [] Abnormal -     Eyes:   EOM    [x]  Normal    [] Abnormal -   Sclera  [x]  Normal    [] Abnormal -          Discharge [x]  None visible   [] Abnormal -     HENT: [x] Normocephalic, atraumatic  [] Abnormal -   [x] Mouth/Throat: Mucous membranes are moist    External Ears [x] Normal  [] Abnormal -    Neck: [x] No visualized mass [] Abnormal -     Pulmonary/Chest: [x] Respiratory effort normal   [x] No visualized signs of difficulty breathing or respiratory distress        [] Abnormal -      Musculoskeletal:   [x] Normal gait with no signs of ataxia         [x] Normal range of motion of neck        [] Abnormal -     Neurological:        [x] No Facial Asymmetry (Cranial nerve 7 motor function) (limited exam due to video visit)          [x] No gaze palsy        [] Abnormal -          Skin:        [x] No significant exanthematous lesions or discoloration noted on facial skin         [] Abnormal -            Psychiatric:       [x] Normal Affect [] Abnormal -        [x] No Hallucinations    Other pertinent observable physical exam findings:-        We discussed the expected course, resolution and complications of the diagnosis(es) in detail. Medication risks, benefits, costs, interactions, and alternatives were discussed as indicated. I advised him to contact the office if his condition worsens, changes or fails to improve as anticipated.  He expressed understanding with the diagnosis(es) and plan. Zacarias Gutierrez, who was evaluated through a patient-initiated, synchronous (real-time) audio-video encounter, and/or his healthcare decision maker, is aware that it is a billable service, with coverage as determined by his insurance carrier. He provided verbal consent to proceed: Yes, and patient identification was verified. It was conducted pursuant to the emergency declaration under the [address] [place] [address] and the [employer] and [employer] General Act. A caregiver was present when appropriate. Ability to conduct physical exam was limited. I was at home. The patient was at home.       Vignesh Vásquez,

## 2021-01-15 NOTE — PROGRESS NOTES
Schedule of Personalized Health Plan  (Provide Copy to Patient)  The best way to stay healthy is to live a healthy lifestyle. A healthy lifestyle includes regular exercise, eating a well-balanced diet, keeping a healthy weight and not smoking. Regular physical exams and screening tests are another important way to take care of yourself. Preventive exams provided by health care providers can find health problems early when treatment works best and can keep you from getting certain diseases or illnesses. Preventive services include exams, lab tests, screenings, shots, monitoring and information to help you take care of your own health. All people over 65 should have a pneumonia shot. Pneumonia shots are usually only needed once in a lifetime unless your doctor decides differently. All people over 65 should have a yearly flu shot. People over 65 are at medium to high risk for Hepatitis B. Three shots are needed for complete protection. In addition to your physical exam, some screening tests are recommended:    Bone mass measurement (dexa scan) is recommended every two years  Diabetes Mellitus screening is recommended every year. Glaucoma is an eye disease caused by high pressure in the eye. An eye exam is recommended every year. Cardiovascular screening tests that check your cholesterol and other blood fat (lipid) levels are recommended every five years. Colorectal Cancer screening tests help to find pre-cancerous polyps (growths in the colon) so they can be removed before they turn into cancer. Tests ordered for screening depend on your personal and family history risk factors.     Screening for Breast Cancer is recommended yearly with a mammogram.    Screening for Cervical Cancer is recommended every two years (annually for certain risk factors, such as previous history of STD or abnormal PAP in past 7 years), with a Pelvic Exam with PAP    Here is a list of your current Health Maintenance items with a due date:  Health Maintenance   Topic Date Due    DTaP/Tdap/Td series (1 - Tdap) 07/17/1966    Flu Vaccine (1) 09/01/2020    GLAUCOMA SCREENING Q2Y  11/08/2021    Lipid Screen  01/08/2022    Medicare Yearly Exam  01/16/2022    Colorectal Cancer Screening Combo  02/15/2023    Shingrix Vaccine Age 50>  Completed    Pneumococcal 65+ years  Completed    Hepatitis C Screening  Addressed       This is the Subsequent Medicare Annual Wellness Exam, performed 12 months or more after the Initial AWV or the last Subsequent AWV    I have reviewed the patient's medical history in detail and updated the computerized patient record. Depression Risk Factor Screening:     3 most recent PHQ Screens 7/13/2020   Little interest or pleasure in doing things Not at all   Feeling down, depressed, irritable, or hopeless Not at all   Total Score PHQ 2 0       Alcohol Risk Screen    Do you average more than 1 drink per night or more than 7 drinks a week: No    In the past three months have you have had more than 4 drinks containing alcohol on one occasion: No        Functional Ability and Level of Safety:    Hearing: Hearing is good. Activities of Daily Living: The home contains: no safety equipment. Patient does total self care      Ambulation: with no difficulty     Fall Risk:  Fall Risk Assessment, last 12 mths 7/13/2020   Able to walk? Yes   Fall in past 12 months? No      Abuse Screen:  Patient is not abused       Cognitive Screening    Has your family/caregiver stated any concerns about your memory: no     Cognitive Screening: A+O x 3    Assessment/Plan   Education and counseling provided:  Are appropriate based on today's review and evaluation    Diagnoses and all orders for this visit:    1. Essential hypertension  -     METABOLIC PANEL, BASIC; Future    2. Pure hypercholesterolemia  -     LIPID PANEL; Future  -     METABOLIC PANEL, BASIC; Future  -     ALT; Future  -     AST; Future    3.  Benign prostatic hyperplasia with incomplete bladder emptying    4. Venous insufficiency of both lower extremities    5. Erectile dysfunction, unspecified erectile dysfunction type    6. Medicare annual wellness visit, subsequent    Other orders  -     diclofenac EC (VOLTAREN) 75 mg EC tablet; TAKE 1 TABLET DAILY  -     losartan (COZAAR) 50 mg tablet; TAKE 1 TABLET DAILY  -     simvastatin (ZOCOR) 40 mg tablet; TAKE 1 TABLET NIGHTLY        Health Maintenance Due     Health Maintenance Due   Topic Date Due    DTaP/Tdap/Td series (1 - Tdap) 07/17/1966    Flu Vaccine (1) 09/01/2020       Patient Care Team   Patient Care Team:  Carmelita Babinski, DO as PCP - General  Carmelita Babinski, DO as PCP - [facility]  Zuleyka Castanon MD (Ophthalmology)  Sonny Fuentes MD (Gastroenterology)  Kelsey Chavez RN as [facility] (Internal Medicine)  Andrew Giraldo as Physician (Optometry)    History     Patient Active Problem List   Diagnosis Code    HTN (hypertension) I10    Pure hypercholesterolemia E78.00    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    Seborrheic dermatitis L21.9    Hyperglycemia R73.9    ACP (advance care planning) Z71.89    Trigger index finger of left hand M65.322    Wrist swelling, left M25.432     Past Medical History:   Diagnosis Date    Arthritis     knee    ED (erectile dysfunction)     Hemorrhoids     Hypercholesterolemia     Hypertension     Other ill-defined conditions(799.89)     hx phlebitis left leg      Past Surgical History:   Procedure Laterality Date    HX CATARACT REMOVAL      bilateral then bilateral laser surgery to open back of eye to let more light in    HX HERNIA REPAIR inguinal hernia repair    HX ORTHOPAEDIC      8 left knee surgeries - arthroscopies/ACL repair/knee replacement    HX RETINAL DETACHMENT REPAIR      2 right eye/1 left eye to repair retina and macula    HX TONSILLECTOMY      HX VASECTOMY       Current Outpatient Medications   Medication Sig Dispense Refill    diclofenac EC (VOLTAREN) 75 mg EC tablet TAKE 1 TABLET DAILY 90 Tab 1    losartan (COZAAR) 50 mg tablet TAKE 1 TABLET DAILY 90 Tab 1    simvastatin (ZOCOR) 40 mg tablet TAKE 1 TABLET NIGHTLY 90 Tab 1    tamsulosin (FLOMAX) 0.4 mg capsule       tolterodine (DETROL) 2 mg tablet Take 2 mg by mouth daily.  aspirin delayed-release 81 mg tablet Take 81 mg by mouth daily.  Omega-3-DHA-EPA-Fish Oil 1,000 (120-180) mg Cap Take 1,200 mg by mouth daily.  PV W-O MILLICENT/FERROUS FUMARATE/FA (M-VIT PO) Take 1 Tab by mouth. Takes one multivitamin po daily. Does not have iron. Allergies   Allergen Reactions    Bactrim [Sulfamethoprim Ds] Hives and Other (comments)     Nausea,sweating,cold sweats    Penicillins Swelling     Throat and face  Throat and face    Sulfamethoxazole-Trimethoprim Hives and Unknown (comments)     Nausea,sweating,cold sweats    Lisinopril Angioedema and Swelling       Family History   Problem Relation Age of Onset    Lung Disease Mother     No Known Problems Father      Social History     Tobacco Use    Smoking status: Current Some Day Smoker     Types: Cigars    Smokeless tobacco: Never Used    Tobacco comment: occasional cigar   Substance Use Topics    Alcohol use:  Yes     Alcohol/week: 0.8 standard drinks     Types: 1 Glasses of wine per week

## 2021-02-21 NOTE — PROGRESS NOTES
Health Maintenance Due   Topic Date Due    Hepatitis C Screening  1945    DTaP/Tdap/Td series (1 - Tdap) 12/14/2011    MEDICARE YEARLY EXAM  07/13/2016       Chief Complaint   Patient presents with    Hypertension     6 month follow up    Cholesterol Problem    Benign Prostatic Hypertrophy    Annual Wellness Visit       1. Have you been to the ER, urgent care clinic since your last visit? Hospitalized since your last visit? No    2. Have you seen or consulted any other health care providers outside of the [address] [place] [address] since your last visit? Include any pap smears or colon screening. No    3) Do you have an Advance Directive on file? no    4) Are you interested in receiving information on Advance Directives? NO      Patient is accompanied by self I have received verbal consent from [address] [place] [address] to discuss any/all medical information while they are present in the room.  Symptoms

## 2021-04-30 ENCOUNTER — APPOINTMENT (OUTPATIENT)
Dept: GENERAL RADIOLOGY | Age: 76
End: 2021-04-30
Attending: EMERGENCY MEDICINE
Payer: MEDICARE

## 2021-04-30 ENCOUNTER — HOSPITAL ENCOUNTER (EMERGENCY)
Age: 76
Discharge: HOME | End: 2021-04-30
Attending: EMERGENCY MEDICINE
Payer: MEDICARE

## 2021-04-30 VITALS
RESPIRATION RATE: 16 BRPM | OXYGEN SATURATION: 97 % | HEART RATE: 53 BPM | HEIGHT: 69 IN | SYSTOLIC BLOOD PRESSURE: 179 MMHG | TEMPERATURE: 97.9 F | DIASTOLIC BLOOD PRESSURE: 99 MMHG | BODY MASS INDEX: 27.95 KG/M2 | WEIGHT: 188.71 LBS

## 2021-04-30 DIAGNOSIS — S20.212A CONTUSION OF LEFT CHEST WALL, INITIAL ENCOUNTER: Primary | ICD-10-CM

## 2021-04-30 DIAGNOSIS — I10 ESSENTIAL HYPERTENSION: ICD-10-CM

## 2021-04-30 DIAGNOSIS — S23.41XA SPRAIN OF COSTAL CARTILAGE, INITIAL ENCOUNTER: ICD-10-CM

## 2021-04-30 PROCEDURE — 74011000250 HC RX REV CODE- 250: Performed by: EMERGENCY MEDICINE

## 2021-04-30 PROCEDURE — 74011250637 HC RX REV CODE- 250/637: Performed by: EMERGENCY MEDICINE

## 2021-04-30 PROCEDURE — 99284 EMERGENCY DEPT VISIT MOD MDM: CPT

## 2021-04-30 PROCEDURE — 71101 X-RAY EXAM UNILAT RIBS/CHEST: CPT

## 2021-04-30 RX ORDER — CLONIDINE HYDROCHLORIDE 0.1 MG/1
0.1 TABLET ORAL
Status: COMPLETED | OUTPATIENT
Start: 2021-04-30 | End: 2021-04-30

## 2021-04-30 RX ORDER — METOPROLOL TARTRATE 25 MG/1
25 TABLET, FILM COATED ORAL
Status: COMPLETED | OUTPATIENT
Start: 2021-04-30 | End: 2021-04-30

## 2021-04-30 RX ORDER — LIDOCAINE 50 MG/G
PATCH TOPICAL
Qty: 30 EACH | Refills: 0 | Status: SHIPPED | OUTPATIENT
Start: 2021-04-30 | End: 2021-04-30 | Stop reason: SDUPTHER

## 2021-04-30 RX ORDER — LIDOCAINE 4 G/100G
1 PATCH TOPICAL ONCE
Status: DISCONTINUED | OUTPATIENT
Start: 2021-04-30 | End: 2021-04-30 | Stop reason: HOSPADM

## 2021-04-30 RX ORDER — LOSARTAN POTASSIUM 25 MG/1
50 TABLET ORAL
Status: COMPLETED | OUTPATIENT
Start: 2021-04-30 | End: 2021-04-30

## 2021-04-30 RX ORDER — HYDROCHLOROTHIAZIDE 25 MG/1
25 TABLET ORAL DAILY
Qty: 10 TAB | Refills: 0 | Status: SHIPPED | OUTPATIENT
Start: 2021-04-30 | End: 2021-05-05 | Stop reason: SDUPTHER

## 2021-04-30 RX ORDER — NAPROXEN 250 MG/1
500 TABLET ORAL
Status: COMPLETED | OUTPATIENT
Start: 2021-04-30 | End: 2021-04-30

## 2021-04-30 RX ORDER — LIDOCAINE 50 MG/G
PATCH TOPICAL
Qty: 30 EACH | Refills: 0 | Status: SHIPPED | OUTPATIENT
Start: 2021-04-30 | End: 2021-07-13 | Stop reason: ALTCHOICE

## 2021-04-30 RX ADMIN — LOSARTAN POTASSIUM 50 MG: 25 TABLET, FILM COATED ORAL at 11:27

## 2021-04-30 RX ADMIN — NAPROXEN 500 MG: 250 TABLET ORAL at 13:47

## 2021-04-30 RX ADMIN — METOPROLOL TARTRATE 25 MG: 25 TABLET, FILM COATED ORAL at 10:20

## 2021-04-30 RX ADMIN — CLONIDINE HYDROCHLORIDE 0.1 MG: 0.1 TABLET ORAL at 12:50

## 2021-04-30 NOTE — ED PROVIDER NOTES
Patient is a 77-year-old male with past medical history significant for arthritis in left knee status post replacement, hyperlipidemia, hypertension on medications who presents emergency department for evaluation of left posterior chest wall pain after fall last night. He states he had been eating dinner outside when it started to rain so they were hardly bringing everything inside when he tripped on some questions and fell hitting his left posterior chest on the kitchen counter. He states pain was severe initially and has improved however he noted some swelling to the area and some pain with palpation. No difficulty breathing, no blood in the urine, no abdominal pain, no fevers, no neck or back pain. He did not hit his head and has no headache.            Past Medical History:   Diagnosis Date    Arthritis     knee    ED (erectile dysfunction)     Hemorrhoids     Hypercholesterolemia     Hypertension     Other ill-defined conditions(319.89)     hx phlebitis left leg       Past Surgical History:   Procedure Laterality Date    HX CATARACT REMOVAL      bilateral then bilateral laser surgery to open back of eye to let more light in    HX HERNIA REPAIR      inguinal hernia repair    HX ORTHOPAEDIC      8 left knee surgeries - arthroscopies/ACL repair/knee replacement    HX RETINAL DETACHMENT REPAIR      2 right eye/1 left eye to repair retina and macula    HX TONSILLECTOMY      HX VASECTOMY           Family History:   Problem Relation Age of Onset    Lung Disease Mother     No Known Problems Father        Social History     Socioeconomic History    Marital status:      Spouse name: Not on file    Number of children: Not on file    Years of education: Not on file    Highest education level: Not on file   Occupational History    Not on file   Social Needs    Financial resource strain: Not on file    Food insecurity     Worry: Not on file     Inability: Not on file   [employer] needs Medical: Not on file     Non-medical: Not on file   Tobacco Use    Smoking status: Current Some Day Smoker     Types: Cigars    Smokeless tobacco: Never Used    Tobacco comment: occasional cigar   Substance and Sexual Activity    Alcohol use: Yes     Alcohol/week: 0.8 standard drinks     Types: 1 Glasses of wine per week    Drug use: No    Sexual activity: Yes     Partners: Female   Lifestyle    Physical activity     Days per week: Not on file     Minutes per session: Not on file    Stress: Not on file   Relationships    Social connections     Talks on phone: Not on file     Gets together: Not on file     Attends Bahai service: Not on file     Active member of club or organization: Not on file     Attends meetings of clubs or organizations: Not on file     Relationship status: Not on file    Intimate partner violence     Fear of current or ex partner: Not on file     Emotionally abused: Not on file     Physically abused: Not on file     Forced sexual activity: Not on file   Other Topics Concern    Not on file   Social History Narrative    Not on file         ALLERGIES: Bactrim [sulfamethoprim ds], Penicillins, Sulfamethoxazole-trimethoprim, and Lisinopril    Review of Systems   Constitutional: Negative for chills and fever. HENT: Negative for trouble swallowing. Eyes: Negative for photophobia. Respiratory: Negative for shortness of breath. Cardiovascular: Positive for chest pain (Chest wall pain). Gastrointestinal: Negative for abdominal pain and nausea. Genitourinary: Negative for difficulty urinating, dysuria and hematuria. Musculoskeletal: Negative for neck pain. Skin: Negative for rash. Neurological: Negative for dizziness, seizures, syncope, facial asymmetry, speech difficulty, light-headedness, numbness and headaches. Hematological: Does not bruise/bleed easily. Psychiatric/Behavioral: Negative.         Vitals:    04/30/21 0826   BP: (!) 192/108   Pulse: 74   Resp: 16 Temp: 98.1 °F (36.7 °C)   SpO2: 97%   Weight: 85.6 kg (188 lb 11.4 oz)   Height: 5' 9\" (1.753 m)            Physical Exam  Vitals signs and nursing note reviewed. Constitutional:       Appearance: Normal appearance. He is not toxic-appearing. Comments: Appears younger than stated age   HENT:      Head: Normocephalic and atraumatic. Right Ear: External ear normal.      Left Ear: External ear normal.      Nose:      Comments: Mask in place  Eyes:      General: No scleral icterus. Neck:      Musculoskeletal: Normal range of motion and neck supple. No neck rigidity or muscular tenderness. Cardiovascular:      Rate and Rhythm: Normal rate. Pulses: Normal pulses. Pulmonary:      Effort: Pulmonary effort is normal.      Breath sounds: Normal breath sounds. Chest:      Chest wall: No lacerations, deformity or crepitus. Abdominal:      General: Bowel sounds are normal.      Palpations: Abdomen is soft. Tenderness: There is no abdominal tenderness. Musculoskeletal:         General: No deformity. Thoracic back: He exhibits no laceration. Back:    Skin:     General: Skin is warm and dry. Neurological:      Mental Status: He is alert and oriented to person, place, and time. Psychiatric:         Mood and Affect: Mood normal.         Behavior: Behavior normal.         Thought Content: Thought content normal.         Judgment: Judgment normal.          MDM  Number of Diagnoses or Management Options  Contusion of left chest wall, initial encounter  Essential hypertension  Sprain of costal cartilage, initial encounter  Diagnosis management comments: Patient with known hypertension and had just taken his morning medicines upon arrival to the emergency department. Patient is asymptomatic with his blood pressure. Advised to follow with his primary care doctor regarding his high blood pressure as well.   Despite patient not seemingly being in much discomfort the nurse did note that when up walking around and getting up and out of the bed he seemed to have some discomfort in the area of his injury. We will give some pain medicines in case this may be contributing to his hypertension. After meds given patient's blood pressure improved to 179/99. Patient vies close follow-up with his primary care doctor       Amount and/or Complexity of Data Reviewed  Tests in the radiology section of CPT®: ordered and reviewed  Decide to obtain previous medical records or to obtain history from someone other than the patient: yes      ED Course as of Apr 30 1413   Fri Apr 30, 2021   1103 Pt's bp continues to climb. Pt denies any complaint other than soreness in area of bruising from fall. No pain, shortness of breath or headache. No dizziness or difficulty ambulating. Will give patient additional dose of blood pressure medicine and reassess.   Declines anything for pain    [JE]   1404 Blood pressure 179/89 after pain medicine    [JE]      ED Course User Index  [JE] Lorelei Bui MD       Procedures

## 2021-04-30 NOTE — DISCHARGE INSTRUCTIONS
Use diclofenac or Aleve for pain as discussed. Return to the emergency department for any new or worsening symptoms. Ice (20 min on/20 min off) for the next 2 days.

## 2021-04-30 NOTE — ED TRIAGE NOTES
Pt states that last night he tripped and hit his left rib cage and since then the area has been sore. Pt deneis hitting his head and LOC.

## 2021-05-05 ENCOUNTER — OFFICE VISIT (OUTPATIENT)
Dept: INTERNAL MEDICINE CLINIC | Age: 76
End: 2021-05-05
Payer: MEDICARE

## 2021-05-05 VITALS
SYSTOLIC BLOOD PRESSURE: 136 MMHG | RESPIRATION RATE: 16 BRPM | HEIGHT: 69 IN | WEIGHT: 181 LBS | OXYGEN SATURATION: 97 % | HEART RATE: 92 BPM | DIASTOLIC BLOOD PRESSURE: 82 MMHG | BODY MASS INDEX: 26.81 KG/M2 | TEMPERATURE: 97.7 F
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
## 2021-05-05 DIAGNOSIS — I10 ESSENTIAL HYPERTENSION: Primary | ICD-10-CM

## 2021-05-05 PROCEDURE — G8419 CALC BMI OUT NRM PARAM NOF/U: HCPCS | Performed by: INTERNAL MEDICINE

## 2021-05-05 PROCEDURE — G8536 NO DOC ELDER MAL SCRN: HCPCS | Performed by: INTERNAL MEDICINE

## 2021-05-05 PROCEDURE — G8754 DIAS BP LESS 90: HCPCS | Performed by: INTERNAL MEDICINE

## 2021-05-05 PROCEDURE — 3017F COLORECTAL CA SCREEN DOC REV: CPT | Performed by: INTERNAL MEDICINE

## 2021-05-05 PROCEDURE — G8432 DEP SCR NOT DOC, RNG: HCPCS | Performed by: INTERNAL MEDICINE

## 2021-05-05 PROCEDURE — 99214 OFFICE O/P EST MOD 30 MIN: CPT | Performed by: INTERNAL MEDICINE

## 2021-05-05 PROCEDURE — G0463 HOSPITAL OUTPT CLINIC VISIT: HCPCS | Performed by: INTERNAL MEDICINE

## 2021-05-05 PROCEDURE — 1101F PT FALLS ASSESS-DOCD LE1/YR: CPT | Performed by: INTERNAL MEDICINE

## 2021-05-05 PROCEDURE — G8752 SYS BP LESS 140: HCPCS | Performed by: INTERNAL MEDICINE

## 2021-05-05 PROCEDURE — G8427 DOCREV CUR MEDS BY ELIG CLIN: HCPCS | Performed by: INTERNAL MEDICINE

## 2021-05-05 RX ORDER — HYDROCHLOROTHIAZIDE 25 MG/1
25 TABLET ORAL DAILY
Qty: 90 TAB | Refills: 0 | Status: SHIPPED | OUTPATIENT
Start: 2021-05-05 | End: 2021-07-13 | Stop reason: SDUPTHER

## 2021-05-05 NOTE — PROGRESS NOTES
HISTORY OF PRESENT ILLNESS  Lupis Shankar is a 76 y.o. male. Patient was seen after an ER follow up. Patient had a fall and went back on his left side. Reports that he had severe bruising to the lateral side and pain. Patient was stabilized in the ER and wa discharged with medications. While there his BP continued to be significantly high after given additional medications. Was told to follow up with his PCP. Patient trended his BP before coming in. BP daily were in the 170's and would decrease later in the day. Reports no CP, SOB, fever or HA. Was given HCTZ PRN in BP was over 170. Visit Vitals  /82 (BP 1 Location: Right upper arm, BP Patient Position: Sitting, BP Cuff Size: Adult)   Pulse 92   Temp 97.7 °F (36.5 °C) (Oral)   Resp 16   Ht 5' 9\" (1.753 m)   Wt 181 lb (82.1 kg)   SpO2 97%   BMI 26.73 kg/m²     Past Medical History:   Diagnosis Date    Arthritis     knee    ED (erectile dysfunction)     Hemorrhoids     Hypercholesterolemia     Hypertension     Other ill-defined conditions(799.89)     hx phlebitis left leg     Past Surgical History:   Procedure Laterality Date    HX CATARACT REMOVAL      bilateral then bilateral laser surgery to open back of eye to let more light in    HX HERNIA REPAIR      inguinal hernia repair    HX ORTHOPAEDIC      8 left knee surgeries - arthroscopies/ACL repair/knee replacement    HX RETINAL DETACHMENT REPAIR      2 right eye/1 left eye to repair retina and macula    HX TONSILLECTOMY      HX VASECTOMY       Family History   Problem Relation Age of Onset    Lung Disease Mother     No Known Problems Father      Outpatient Encounter Medications as of 5/5/2021   Medication Sig Dispense Refill    hydroCHLOROthiazide (HYDRODIURIL) 25 mg tablet Take 1 Tab by mouth daily for 90 days. 90 Tab 0    lidocaine (LIDODERM) 5 % Apply patch to the affected area for 12 hours a day and remove for 12 hours a day.  30 Each 0    diclofenac EC (VOLTAREN) 75 mg EC tablet TAKE 1 TABLET DAILY 90 Tab 1    losartan (COZAAR) 50 mg tablet TAKE 1 TABLET DAILY 90 Tab 1    simvastatin (ZOCOR) 40 mg tablet TAKE 1 TABLET NIGHTLY 90 Tab 1    tamsulosin (FLOMAX) 0.4 mg capsule       tolterodine (DETROL) 2 mg tablet Take 2 mg by mouth daily.  aspirin delayed-release 81 mg tablet Take 81 mg by mouth daily.  Omega-3-DHA-EPA-Fish Oil 1,000 (120-180) mg Cap Take 1,200 mg by mouth daily.  PV W-O MILLICENT/FERROUS FUMARATE/FA (M-VIT PO) Take 1 Tab by mouth. Takes one multivitamin po daily. Does not have iron.  [DISCONTINUED] hydroCHLOROthiazide (HYDRODIURIL) 25 mg tablet Take 1 Tab by mouth daily for 10 days. 10 Tab 0     No facility-administered encounter medications on file as of 5/5/2021. HPI    Review of Systems   Constitutional: Negative. Respiratory: Negative for cough and sputum production. Cardiovascular: Negative. Gastrointestinal: Negative. Musculoskeletal: Positive for back pain and joint pain. Negative for falls. Neurological: Negative. Physical Exam  Vitals signs and nursing note reviewed. Cardiovascular:      Rate and Rhythm: Normal rate and regular rhythm. Pulmonary:      Effort: Pulmonary effort is normal. No respiratory distress. Breath sounds: Normal breath sounds. Abdominal:      Palpations: Abdomen is soft. Musculoskeletal:      Comments: Left later chest tenderness, hematoma    Neurological:      Mental Status: He is alert. ASSESSMENT and PLAN  Diagnoses and all orders for this visit:    1. Essential hypertension  -     hydroCHLOROthiazide (HYDRODIURIL) 25 mg tablet; Take 1 Tab by mouth daily for 90 days. -     Will continue to low sodium diet   2. Fall, initial encounter       -      Discussed CP, SOB after fall. Dicussed going to the ER if this happened     Follow-up and Dispositions    · Return in about 6 months (around 11/5/2021), or if symptoms worsen or fail to improve, for Follow up.        reviewed diet, exercise and weight control  reviewed medications and side effects in detail

## 2021-05-05 NOTE — PROGRESS NOTES
Health Maintenance Due   Topic Date Due    DTaP/Tdap/Td series (1 - Tdap) 07/17/1966       Chief Complaint   Patient presents with    Fall    Hypertension    Pain (Chronic)       1. Have you been to the ER, urgent care clinic since your last visit? Hospitalized since your last visit? Yes When: 4/30/21, [facility],  Contusion of left chest wall, sprain of costal cartilage, hypertension                    2. Have you seen or consulted any other health care providers outside of the [address] [place] [address] since your last visit? Include any pap smears or colon screening. No    3) Do you have an Advance Directive on file? no    4) Are you interested in receiving information on Advance Directives? NO      Patient is accompanied by self I have received verbal consent from [address] [place] [address] to discuss any/all medical information while they are present in the room.

## 2021-07-06 LAB
ALT SERPL-CCNC: 20 IU/L (ref 0–44)
AST SERPL-CCNC: 20 IU/L (ref 0–40)
BUN SERPL-MCNC: 29 MG/DL (ref 8–27)
BUN/CREAT SERPL: 27 (ref 10–24)
CALCIUM SERPL-MCNC: 9.6 MG/DL (ref 8.6–10.2)
CHLORIDE SERPL-SCNC: 106 MMOL/L (ref 96–106)
CHOLEST SERPL-MCNC: 164 MG/DL (ref 100–199)
CO2 SERPL-SCNC: 26 MMOL/L (ref 20–29)
CREAT SERPL-MCNC: 1.08 MG/DL (ref 0.76–1.27)
GLUCOSE SERPL-MCNC: 107 MG/DL (ref 65–99)
HDLC SERPL-MCNC: 59 MG/DL
IMP & REVIEW OF LAB RESULTS: NORMAL
LDLC SERPL CALC-MCNC: 89 MG/DL (ref 0–99)
POTASSIUM SERPL-SCNC: 4.4 MMOL/L (ref 3.5–5.2)
SODIUM SERPL-SCNC: 143 MMOL/L (ref 134–144)
TRIGL SERPL-MCNC: 85 MG/DL (ref 0–149)
VLDLC SERPL CALC-MCNC: 16 MG/DL (ref 5–40)

## 2021-07-13 ENCOUNTER — OFFICE VISIT (OUTPATIENT)
Dept: INTERNAL MEDICINE CLINIC | Age: 76
End: 2021-07-13
Payer: MEDICARE

## 2021-07-13 VITALS
TEMPERATURE: 97.7 F | OXYGEN SATURATION: 98 % | DIASTOLIC BLOOD PRESSURE: 84 MMHG | WEIGHT: 183 LBS | BODY MASS INDEX: 27.11 KG/M2 | HEART RATE: 76 BPM | SYSTOLIC BLOOD PRESSURE: 138 MMHG | HEIGHT: 69 IN | RESPIRATION RATE: 18 BRPM

## 2021-07-13 DIAGNOSIS — Z85.828 HISTORY OF SKIN CANCER: ICD-10-CM

## 2021-07-13 DIAGNOSIS — I10 ESSENTIAL HYPERTENSION: Primary | ICD-10-CM

## 2021-07-13 DIAGNOSIS — E78.00 PURE HYPERCHOLESTEROLEMIA: ICD-10-CM

## 2021-07-13 DIAGNOSIS — I87.2 VENOUS INSUFFICIENCY OF BOTH LOWER EXTREMITIES: ICD-10-CM

## 2021-07-13 DIAGNOSIS — R39.14 BENIGN PROSTATIC HYPERPLASIA WITH INCOMPLETE BLADDER EMPTYING: ICD-10-CM

## 2021-07-13 DIAGNOSIS — N40.1 BENIGN PROSTATIC HYPERPLASIA WITH INCOMPLETE BLADDER EMPTYING: ICD-10-CM

## 2021-07-13 PROCEDURE — 1101F PT FALLS ASSESS-DOCD LE1/YR: CPT | Performed by: INTERNAL MEDICINE

## 2021-07-13 PROCEDURE — G8427 DOCREV CUR MEDS BY ELIG CLIN: HCPCS | Performed by: INTERNAL MEDICINE

## 2021-07-13 PROCEDURE — G0463 HOSPITAL OUTPT CLINIC VISIT: HCPCS | Performed by: INTERNAL MEDICINE

## 2021-07-13 PROCEDURE — G8510 SCR DEP NEG, NO PLAN REQD: HCPCS | Performed by: INTERNAL MEDICINE

## 2021-07-13 PROCEDURE — G8419 CALC BMI OUT NRM PARAM NOF/U: HCPCS | Performed by: INTERNAL MEDICINE

## 2021-07-13 PROCEDURE — G8752 SYS BP LESS 140: HCPCS | Performed by: INTERNAL MEDICINE

## 2021-07-13 PROCEDURE — G8754 DIAS BP LESS 90: HCPCS | Performed by: INTERNAL MEDICINE

## 2021-07-13 PROCEDURE — G8536 NO DOC ELDER MAL SCRN: HCPCS | Performed by: INTERNAL MEDICINE

## 2021-07-13 PROCEDURE — 99214 OFFICE O/P EST MOD 30 MIN: CPT | Performed by: INTERNAL MEDICINE

## 2021-07-13 PROCEDURE — 3017F COLORECTAL CA SCREEN DOC REV: CPT | Performed by: INTERNAL MEDICINE

## 2021-07-13 RX ORDER — SIMVASTATIN 40 MG/1
TABLET, FILM COATED ORAL
Qty: 90 TABLET | Refills: 1 | Status: SHIPPED | OUTPATIENT
Start: 2021-07-13 | End: 2021-12-22

## 2021-07-13 RX ORDER — HYDROCHLOROTHIAZIDE 25 MG/1
25 TABLET ORAL DAILY
Qty: 90 TABLET | Refills: 1 | Status: SHIPPED | OUTPATIENT
Start: 2021-07-13 | End: 2022-01-09

## 2021-07-13 RX ORDER — LOSARTAN POTASSIUM 50 MG/1
TABLET ORAL
Qty: 90 TABLET | Refills: 1 | Status: SHIPPED | OUTPATIENT
Start: 2021-07-13 | End: 2022-01-11 | Stop reason: SDUPTHER

## 2021-07-13 NOTE — PROGRESS NOTES
ADVISED PATIENT OF THE FOLLOWING HEALTH MAINTAINCE DUE  Health Maintenance Due   Topic Date Due    DTaP/Tdap/Td series (1 - Tdap) 12/14/2011      Chief Complaint   Patient presents with    Hypertension     6 month f/u     Benign Prostatic Hypertrophy    Cholesterol Problem       1. Have you been to the ER, urgent care clinic since your last visit? Hospitalized since your last visit? No    2. Have you seen or consulted any other health care providers outside of the [address] [place] [address] since your last visit? Include any DEXA scan, mammography  or colon screening. No    3. Do you have an Advance Directive on file? no    4. Do you have a DNR on file? no    Patient is accompanied by self I have received verbal consent from [address] [place] [address] to discuss any/all medical information while they are present in the room. No flowsheet data found. [address] [place] [address]  Phone: [phone] Fax: [phone]    [facility] [address] [place] [address] [address]  Phone: [phone] Fax: [phone]        Patient reminded during visit to bring all medication bottles, OTC medications to all appointments.

## 2021-07-13 NOTE — PROGRESS NOTES
HISTORY OF PRESENT ILLNESS  Sofia Courtney is a 76 y.o. male. Pt. comes in for f/u. Has multiple medical problems. BP is stable. Went to ER in April after a fall. BP was high. HCTZ was added. No side effects. History of DVT. Has chronic venous insufficiency which has been stable. Has chronic BPH symptoms. Followed by . On flomax and detrol. No smoking. Drinks alcohol socially. Reports compliance with medications and diet. Med list and most recent labs/studies reviewed with pt. Trying to be active physically to control weight. Still exercising and playing golf on a regular basis. He is retired. Does a lot of volunteer work. Recent labs are stable. Needs med refills. Has had covid vaccination. No related issues. Reports no other new c/o. HPI    Review of Systems   Constitutional: Negative. HENT: Negative. Eyes: Negative for blurred vision. Respiratory: Negative for shortness of breath. Cardiovascular: Positive for leg swelling. Negative for chest pain. Gastrointestinal: Negative for abdominal pain. Genitourinary: Positive for urgency. Negative for dysuria. Musculoskeletal: Positive for joint pain. Negative for falls. Skin: Negative. Neurological: Negative for dizziness, sensory change, focal weakness and headaches. Endo/Heme/Allergies: Negative. Psychiatric/Behavioral: Negative. All other systems reviewed and are negative. Physical Exam  Vitals and nursing note reviewed. Constitutional:       General: He is not in acute distress. Appearance: He is well-developed. Comments: pleasant   HENT:      Head: Normocephalic and atraumatic. Mouth/Throat:      Mouth: Mucous membranes are moist.      Pharynx: Oropharynx is clear. Eyes:      General: No scleral icterus. Conjunctiva/sclera: Conjunctivae normal.   Neck:      Thyroid: No thyromegaly. Vascular: No carotid bruit or JVD.    Cardiovascular:      Rate and Rhythm: Normal rate and regular rhythm. Heart sounds: Normal heart sounds. No murmur heard. Pulmonary:      Effort: Pulmonary effort is normal. No respiratory distress. Breath sounds: Normal breath sounds. No wheezing or rales. Abdominal:      General: Bowel sounds are normal. There is no distension. Palpations: Abdomen is soft. Tenderness: There is no abdominal tenderness. Musculoskeletal:         General: No tenderness. Cervical back: Normal range of motion and neck supple. Right lower leg: No edema. Left lower leg: No edema. Comments: djd  L knee surgical scar   Lymphadenopathy:      Cervical: No cervical adenopathy. Skin:     General: Skin is warm and dry. Findings: No rash. Comments: Distal leg hyperpigmentation changes   Neurological:      Mental Status: He is alert and oriented to person, place, and time. Coordination: Coordination normal.   Psychiatric:         Behavior: Behavior normal.         ASSESSMENT and PLAN  Diagnoses and all orders for this visit:    1. Essential hypertension  -     hydroCHLOROthiazide (HYDRODIURIL) 25 mg tablet; Take 1 Tablet by mouth daily for 180 days. 2. Pure hypercholesterolemia    3. Benign prostatic hyperplasia with incomplete bladder emptying    4. Venous insufficiency of both lower extremities    5. History of skin cancer    Other orders  -     simvastatin (ZOCOR) 40 mg tablet; TAKE 1 TABLET NIGHTLY  -     losartan (COZAAR) 50 mg tablet; TAKE 1 TABLET DAILY      Follow-up and Dispositions    · Return in about 6 months (around 1/13/2022).      lab results and schedule of future lab studies reviewed with patient  reviewed diet, exercise and weight control  reviewed medications and side effects in detail  F/u with other MD's as scheduled  COVID-19 precautions discussed with pt  Overall stable

## 2021-12-22 RX ORDER — SIMVASTATIN 40 MG/1
TABLET, FILM COATED ORAL
Qty: 90 TABLET | Refills: 3 | Status: SHIPPED | OUTPATIENT
Start: 2021-12-22

## 2022-01-11 ENCOUNTER — OFFICE VISIT (OUTPATIENT)
Dept: INTERNAL MEDICINE CLINIC | Age: 77
End: 2022-01-11
Payer: MEDICARE

## 2022-01-11 VITALS
HEIGHT: 69 IN | BODY MASS INDEX: 26.84 KG/M2 | TEMPERATURE: 97.7 F | OXYGEN SATURATION: 99 % | SYSTOLIC BLOOD PRESSURE: 130 MMHG | RESPIRATION RATE: 16 BRPM | DIASTOLIC BLOOD PRESSURE: 74 MMHG | WEIGHT: 181.2 LBS | HEART RATE: 86 BPM

## 2022-01-11 DIAGNOSIS — Z00.00 MEDICARE ANNUAL WELLNESS VISIT, SUBSEQUENT: ICD-10-CM

## 2022-01-11 DIAGNOSIS — R73.9 HYPERGLYCEMIA: ICD-10-CM

## 2022-01-11 DIAGNOSIS — N40.1 BENIGN PROSTATIC HYPERPLASIA WITH INCOMPLETE BLADDER EMPTYING: ICD-10-CM

## 2022-01-11 DIAGNOSIS — I10 ESSENTIAL HYPERTENSION: Primary | ICD-10-CM

## 2022-01-11 DIAGNOSIS — E78.00 PURE HYPERCHOLESTEROLEMIA: ICD-10-CM

## 2022-01-11 DIAGNOSIS — I87.2 VENOUS INSUFFICIENCY OF BOTH LOWER EXTREMITIES: ICD-10-CM

## 2022-01-11 DIAGNOSIS — R39.14 BENIGN PROSTATIC HYPERPLASIA WITH INCOMPLETE BLADDER EMPTYING: ICD-10-CM

## 2022-01-11 PROCEDURE — G8536 NO DOC ELDER MAL SCRN: HCPCS | Performed by: INTERNAL MEDICINE

## 2022-01-11 PROCEDURE — G0439 PPPS, SUBSEQ VISIT: HCPCS | Performed by: INTERNAL MEDICINE

## 2022-01-11 PROCEDURE — G8427 DOCREV CUR MEDS BY ELIG CLIN: HCPCS | Performed by: INTERNAL MEDICINE

## 2022-01-11 PROCEDURE — G8419 CALC BMI OUT NRM PARAM NOF/U: HCPCS | Performed by: INTERNAL MEDICINE

## 2022-01-11 PROCEDURE — G0463 HOSPITAL OUTPT CLINIC VISIT: HCPCS | Performed by: INTERNAL MEDICINE

## 2022-01-11 PROCEDURE — G8752 SYS BP LESS 140: HCPCS | Performed by: INTERNAL MEDICINE

## 2022-01-11 PROCEDURE — 1101F PT FALLS ASSESS-DOCD LE1/YR: CPT | Performed by: INTERNAL MEDICINE

## 2022-01-11 PROCEDURE — 99214 OFFICE O/P EST MOD 30 MIN: CPT | Performed by: INTERNAL MEDICINE

## 2022-01-11 PROCEDURE — G8510 SCR DEP NEG, NO PLAN REQD: HCPCS | Performed by: INTERNAL MEDICINE

## 2022-01-11 PROCEDURE — G8754 DIAS BP LESS 90: HCPCS | Performed by: INTERNAL MEDICINE

## 2022-01-11 RX ORDER — TOLTERODINE TARTRATE 2 MG/1
2 TABLET, EXTENDED RELEASE ORAL DAILY
Qty: 90 TABLET | Refills: 1 | Status: SHIPPED | OUTPATIENT
Start: 2022-01-11 | End: 2022-06-10

## 2022-01-11 RX ORDER — LOSARTAN POTASSIUM 50 MG/1
TABLET ORAL
Qty: 90 TABLET | Refills: 1 | Status: SHIPPED | OUTPATIENT
Start: 2022-01-11 | End: 2022-07-11

## 2022-01-11 RX ORDER — HYDROCHLOROTHIAZIDE 25 MG/1
25 TABLET ORAL DAILY
Qty: 90 TABLET | Refills: 1 | Status: SHIPPED | OUTPATIENT
Start: 2022-01-11 | End: 2022-06-10

## 2022-01-11 RX ORDER — TAMSULOSIN HYDROCHLORIDE 0.4 MG/1
0.4 CAPSULE ORAL DAILY
Qty: 90 CAPSULE | Refills: 1 | Status: SHIPPED | OUTPATIENT
Start: 2022-01-11

## 2022-01-11 NOTE — PROGRESS NOTES
HISTORY OF PRESENT ILLNESS  Neil Sousa is a 68 y.o. male. Pt. comes in for f/u. Has multiple medical problems. BP is stable. History of DVT. Has chronic venous insufficiency which has been stable. Has chronic BPH symptoms. Followed by . On flomax and detrol. 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 Followed by ophthalmologist.  Vanice Goldmann has been stable. No smoking. Drinks alcohol socially. Reports compliance with medications and diet. Med list and most recent labs/studies reviewed with pt. Trying to be active physically to control weight. Still exercising and playing golf on a regular basis. He is retired. Does a lot of volunteer work. Most recent labs are stable. Needs med refills. Has had covid vaccination. No related issues. Reports no other new c/o. HPI    Review of Systems   Constitutional: Negative. HENT: Negative. Eyes: Negative for blurred vision. Respiratory: Negative for shortness of breath. Cardiovascular: Positive for leg swelling. Negative for chest pain. Gastrointestinal: Negative for abdominal pain. Genitourinary: Positive for urgency. Negative for dysuria. Musculoskeletal: Positive for joint pain. Negative for falls. Skin: Negative. Neurological: Negative for dizziness, sensory change, focal weakness and headaches. Endo/Heme/Allergies: Negative. Psychiatric/Behavioral: Negative. All other systems reviewed and are negative. Physical Exam  Vitals and nursing note reviewed. Constitutional:       General: He is not in acute distress. Appearance: He is well-developed. Comments: pleasant   HENT:      Head: Normocephalic and atraumatic. Mouth/Throat:      Mouth: Mucous membranes are moist.      Pharynx: Oropharynx is clear. Eyes:      General: No scleral icterus. Conjunctiva/sclera: Conjunctivae normal.   Neck:      Thyroid: No thyromegaly. Vascular: No carotid bruit or JVD.    Cardiovascular:      Rate and Rhythm: Normal rate and regular rhythm. Heart sounds: Normal heart sounds. No murmur heard. Pulmonary:      Effort: Pulmonary effort is normal. No respiratory distress. Breath sounds: Normal breath sounds. No wheezing or rales. Abdominal:      General: Bowel sounds are normal. There is no distension. Palpations: Abdomen is soft. Tenderness: There is no abdominal tenderness. There is no right CVA tenderness or left CVA tenderness. Musculoskeletal:         General: No tenderness. Cervical back: Normal range of motion and neck supple. Right lower leg: No edema. Left lower leg: No edema. Comments: djd  L knee surgical scar   Lymphadenopathy:      Cervical: No cervical adenopathy. Skin:     General: Skin is warm and dry. Findings: No rash. Comments: Distal leg hyperpigmentation changes   Neurological:      General: No focal deficit present. Mental Status: He is alert and oriented to person, place, and time. Coordination: Coordination normal.   Psychiatric:         Behavior: Behavior normal.         ASSESSMENT and PLAN  Diagnoses and all orders for this visit:    1. Essential hypertension  Stable chronic condition. Continue current treatment/medications. Monitor BP at home with goal of 140/90 or less    2. Pure hypercholesterolemia  -     LIPID PANEL; Future  -     METABOLIC PANEL, COMPREHENSIVE; Future  -     CBC WITH AUTOMATED DIFF; Future    3. Benign prostatic hyperplasia with incomplete bladder emptying  Stable chronic condition. Continue current treatment/medications. Follow-up urologist as scheduled  4. Venous insufficiency of both lower extremities  Stable chronic condition. Continue current treatment/medications. 5. Medicare annual wellness visit, subsequent    6. Hyperglycemia  -     HEMOGLOBIN A1C WITH EAG; Future    Other orders  -     losartan (COZAAR) 50 mg tablet; TAKE 1 TABLET DAILY  -     tolterodine (DetroL) 2 mg tablet;  Take 1 Tablet by mouth daily. -     tamsulosin (FLOMAX) 0.4 mg capsule; Take 1 Capsule by mouth daily. -     hydroCHLOROthiazide (HYDRODIURIL) 25 mg tablet; Take 1 Tablet by mouth daily. Follow-up and Dispositions    · Return in about 6 months (around 7/11/2022). All chronic medical problems are stable  Continue with current medical management and plan  lab results and schedule of future lab studies reviewed with patient  reviewed diet, exercise and weight control  reviewed medications and side effects in detail  F/u with other MD's/ providers as scheduled  COVID-19 precautions discussed with pt  An After Visit Summary was printed and given to the patient.

## 2022-01-11 NOTE — PROGRESS NOTES
Health Maintenance Due   Topic Date Due    DTaP/Tdap/Td series (1 - Tdap) 12/14/2011    Flu Vaccine (1) 09/01/2021    Medicare Yearly Exam  01/16/2022       Chief Complaint   Patient presents with    Annual Wellness Visit    Buttocks pain    Hypotestosterone       1. Have you been to the ER, urgent care clinic since your last visit? Hospitalized since your last visit? YES, [facility]       2. Have you seen or consulted any other health care providers outside of the [address] [place] [address] since your last visit? Include any pap smears or colon screening. No    3) Do you have an Advance Directive on file? no    4) Are you interested in receiving information on Advance Directives? NO      Patient is accompanied by self I have received verbal consent from [address] [place] [address] to discuss any/all medical information while they are present in the room.

## 2022-01-11 NOTE — PROGRESS NOTES
Schedule of Personalized Health Plan  (Provide Copy to Patient)  The best way to stay healthy is to live a healthy lifestyle. A healthy lifestyle includes regular exercise, eating a well-balanced diet, keeping a healthy weight and not smoking. Regular physical exams and screening tests are another important way to take care of yourself. Preventive exams provided by health care providers can find health problems early when treatment works best and can keep you from getting certain diseases or illnesses. Preventive services include exams, lab tests, screenings, shots, monitoring and information to help you take care of your own health. All people over 65 should have a pneumonia shot. Pneumonia shots are usually only needed once in a lifetime unless your doctor decides differently. All people over 65 should have a yearly flu shot. People over 65 are at medium to high risk for Hepatitis B. Three shots are needed for complete protection. In addition to your physical exam, some screening tests are recommended:    Bone mass measurement (dexa scan) is recommended every two years if you have certain risk factors, such as personal history of vertebral fracture or chronic steroid medication use    Diabetes Mellitus screening is recommended every year. Glaucoma is an eye disease caused by high pressure in the eye. An eye exam is recommended every year. Cardiovascular screening tests that check your cholesterol and other blood fat (lipid) levels are recommended every five years. Colorectal Cancer screening tests help to find pre-cancerous polyps (growths in the colon) so they can be removed before they turn into cancer. Tests ordered for screening depend on your personal and family history risk factors.     Screening for Prostate Cancer is recommended yearly with a digital rectal exam and/or a PSA test    Here is a list of your current Health Maintenance items with a due date:  Health Maintenance Topic Date Due    DTaP/Tdap/Td series (1 - Tdap) 12/14/2011    Flu Vaccine (1) 09/01/2021    Lipid Screen  07/01/2022    Medicare Yearly Exam  01/12/2023    Shingrix Vaccine Age 50>  Completed    COVID-19 Vaccine  Completed    Pneumococcal 65+ years  Completed    Hepatitis C Screening  Addressed       This is the Subsequent Medicare Annual Wellness Exam, performed 12 months or more after the Initial AWV or the last Subsequent AWV    I have reviewed the patient's medical history in detail and updated the computerized patient record. Assessment/Plan   Education and counseling provided:  Are appropriate based on today's review and evaluation    1. Essential hypertension  2. Pure hypercholesterolemia  -     LIPID PANEL; Future  -     METABOLIC PANEL, COMPREHENSIVE; Future  -     CBC WITH AUTOMATED DIFF; Future  3. Benign prostatic hyperplasia with incomplete bladder emptying  4. Venous insufficiency of both lower extremities  5. Medicare annual wellness visit, subsequent  6. Hyperglycemia  -     HEMOGLOBIN A1C WITH EAG; Future       Depression Risk Factor Screening     3 most recent PHQ Screens 1/11/2022   Little interest or pleasure in doing things Not at all   Feeling down, depressed, irritable, or hopeless Not at all   Total Score PHQ 2 0       Alcohol & Drug Abuse Risk Screen    Do you average more than 1 drink per night or more than 7 drinks a week: No    In the past three months have you have had more than 4 drinks containing alcohol on one occasion: No          Functional Ability and Level of Safety    Hearing: Hearing is good. Activities of Daily Living: The home contains: no safety equipment. Patient does total self care      Ambulation: with no difficulty     Fall Risk:  Fall Risk Assessment, last 12 mths 1/11/2022   Able to walk? Yes   Fall in past 12 months? 0   Do you feel unsteady? 0   Are you worried about falling 0   Is TUG test greater than 12 seconds?  -   Is the gait abnormal? - Number of falls in past 12 months -   Fall with injury? -      Abuse Screen:  Patient is not abused       Cognitive Screening    Has your family/caregiver stated any concerns about your memory: no     Cognitive Screening: A+O x 3    Health Maintenance Due     Health Maintenance Due   Topic Date Due    DTaP/Tdap/Td series (1 - Tdap) 12/14/2011    Flu Vaccine (1) 09/01/2021       Patient Care Team   Patient Care Team:  Cosme Umanzor DO as PCP - General  Cosme Umanzor DO as PCP - [facility]  Abdelrahman Linton MD (Ophthalmology)  Jimenez Egan MD (Gastroenterology)  Lou Tinajero, RN as [facility] (Internal Medicine)  Andrew Montejo as Physician (Optometry)    History     Patient Active Problem List   Diagnosis Code    HTN (hypertension) I10    Pure hypercholesterolemia E78.00    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    Seborrheic dermatitis L21.9    Hyperglycemia R73.9    ACP (advance care planning) Z71.89    Trigger index finger of left hand M65.322    Wrist swelling, left M25.432    History of skin cancer Z85.828     Past Medical History:   Diagnosis Date    Arthritis     knee    ED (erectile dysfunction)     Hemorrhoids     Hypercholesterolemia     Hypertension     Other ill-defined conditions(799.89)     hx phlebitis left leg      Past Surgical History:   Procedure Laterality Date    HX CATARACT REMOVAL      bilateral then bilateral laser surgery to open back of eye to let more light in    HX HERNIA REPAIR      inguinal hernia repair    HX ORTHOPAEDIC      8 left knee surgeries - arthroscopies/ACL repair/knee replacement    HX RETINAL DETACHMENT REPAIR      2 right eye/1 left eye to repair retina and macula    HX TONSILLECTOMY      HX VASECTOMY       Current Outpatient Medications   Medication Sig Dispense Refill    losartan (COZAAR) 50 mg tablet TAKE 1 TABLET DAILY 90 Tablet 1    tolterodine (DetroL) 2 mg tablet Take 1 Tablet by mouth daily. 90 Tablet 1    tamsulosin (FLOMAX) 0.4 mg capsule Take 1 Capsule by mouth daily. 90 Capsule 1    hydroCHLOROthiazide (HYDRODIURIL) 25 mg tablet Take 1 Tablet by mouth daily. 90 Tablet 1    simvastatin (ZOCOR) 40 mg tablet TAKE 1 TABLET NIGHTLY 90 Tablet 3    LUTEIN PO Take 25 mg by mouth daily.  aspirin delayed-release 81 mg tablet Take 81 mg by mouth daily.  Omega-3-DHA-EPA-Fish Oil 1,000 (120-180) mg Cap Take 1,200 mg by mouth daily.  PV W-O MILLICENT/FERROUS FUMARATE/FA (M-VIT PO) Take 1 Tab by mouth. Takes one multivitamin po daily. Does not have iron. Allergies   Allergen Reactions    Bactrim [Sulfamethoprim Ds] Hives and Other (comments)     Nausea,sweating,cold sweats    Penicillins Swelling and Hives     Throat and face  Throat and face    Sulfamethoxazole-Trimethoprim Hives and Unknown (comments)     Nausea,sweating,cold sweats    Lisinopril Angioedema and Swelling    Sulfamethoxazole Hives       Family History   Problem Relation Age of Onset    Lung Disease Mother     No Known Problems Father      Social History     Tobacco Use    Smoking status: Current Some Day Smoker     Types: Cigars    Smokeless tobacco: Never Used    Tobacco comment: occasional cigar   Substance Use Topics    Alcohol use:  Yes     Alcohol/week: 0.8 standard drinks     Types: 1 Glasses of wine per week         Valente Frank DO

## 2022-03-18 PROBLEM — M65.322 TRIGGER INDEX FINGER OF LEFT HAND: Status: ACTIVE | Noted: 2018-07-13

## 2022-03-18 PROBLEM — Z85.828 HISTORY OF SKIN CANCER: Status: ACTIVE | Noted: 2021-07-13

## 2022-03-19 PROBLEM — Z71.89 ACP (ADVANCE CARE PLANNING): Status: ACTIVE | Noted: 2018-07-13

## 2022-03-19 PROBLEM — M25.432 WRIST SWELLING, LEFT: Status: ACTIVE | Noted: 2019-07-12

## 2022-03-19 PROBLEM — R73.9 HYPERGLYCEMIA: Status: ACTIVE | Noted: 2018-07-13

## 2022-03-20 PROBLEM — L21.9 SEBORRHEIC DERMATITIS: Status: ACTIVE | Noted: 2017-07-07

## 2022-05-06 ENCOUNTER — OFFICE VISIT (OUTPATIENT)
Dept: INTERNAL MEDICINE CLINIC | Age: 77
End: 2022-05-06
Payer: MEDICARE

## 2022-05-06 VITALS
DIASTOLIC BLOOD PRESSURE: 76 MMHG | TEMPERATURE: 98 F | WEIGHT: 185 LBS | RESPIRATION RATE: 18 BRPM | SYSTOLIC BLOOD PRESSURE: 132 MMHG | HEIGHT: 69 IN | OXYGEN SATURATION: 97 % | BODY MASS INDEX: 27.4 KG/M2 | HEART RATE: 87 BPM

## 2022-05-06 DIAGNOSIS — M25.531 RIGHT WRIST PAIN: ICD-10-CM

## 2022-05-06 DIAGNOSIS — Z48.02 VISIT FOR SUTURE REMOVAL: Primary | ICD-10-CM

## 2022-05-06 DIAGNOSIS — I10 HYPERTENSION, UNSPECIFIED TYPE: ICD-10-CM

## 2022-05-06 PROCEDURE — 90000 REMOVAL OF SUTURES: CPT | Performed by: INTERNAL MEDICINE

## 2022-05-06 PROCEDURE — 99213 OFFICE O/P EST LOW 20 MIN: CPT | Performed by: INTERNAL MEDICINE

## 2022-05-06 PROCEDURE — G8752 SYS BP LESS 140: HCPCS | Performed by: INTERNAL MEDICINE

## 2022-05-06 PROCEDURE — G8536 NO DOC ELDER MAL SCRN: HCPCS | Performed by: INTERNAL MEDICINE

## 2022-05-06 PROCEDURE — G8419 CALC BMI OUT NRM PARAM NOF/U: HCPCS | Performed by: INTERNAL MEDICINE

## 2022-05-06 PROCEDURE — 1101F PT FALLS ASSESS-DOCD LE1/YR: CPT | Performed by: INTERNAL MEDICINE

## 2022-05-06 PROCEDURE — G8432 DEP SCR NOT DOC, RNG: HCPCS | Performed by: INTERNAL MEDICINE

## 2022-05-06 PROCEDURE — G8754 DIAS BP LESS 90: HCPCS | Performed by: INTERNAL MEDICINE

## 2022-05-06 PROCEDURE — G8427 DOCREV CUR MEDS BY ELIG CLIN: HCPCS | Performed by: INTERNAL MEDICINE

## 2022-05-06 PROCEDURE — G0463 HOSPITAL OUTPT CLINIC VISIT: HCPCS | Performed by: INTERNAL MEDICINE

## 2022-05-06 NOTE — PROGRESS NOTES
HISTORY OF PRESENT ILLNESS  Tom Watt is a 68 y.o. male. Patient was seen to have sutures removed. Was doing some work at his [employer], became caught in the table linen and went down on the concrete hitting his head and wrist. Was taken to [facility]. Has a laceration to the left forehead. CT was negative. His hand was placed in a brace. No acute abnormality. Denies any HA, dizziness or visual changes. Does have a corneal abrasion to the left eye, but this came from contact being torn. Has a follow up with optometry. Visit Vitals  /76 (BP 1 Location: Left upper arm, BP Patient Position: Sitting, BP Cuff Size: Adult)   Pulse 87   Temp 98 °F (36.7 °C) (Oral)   Resp 18   Ht 5' 9\" (1.753 m)   Wt 185 lb (83.9 kg)   SpO2 97%   BMI 27.32 kg/m²     Past Medical History:   Diagnosis Date    Arthritis     knee    ED (erectile dysfunction)     Hemorrhoids     Hypercholesterolemia     Hypertension     Other ill-defined conditions(799.89)     hx phlebitis left leg     Past Surgical History:   Procedure Laterality Date    HX CATARACT REMOVAL      bilateral then bilateral laser surgery to open back of eye to let more light in    HX HERNIA REPAIR      inguinal hernia repair    HX ORTHOPAEDIC      8 left knee surgeries - arthroscopies/ACL repair/knee replacement    HX RETINAL DETACHMENT REPAIR      2 right eye/1 left eye to repair retina and macula    HX TONSILLECTOMY      HX VASECTOMY       Family History   Problem Relation Age of Onset    Lung Disease Mother     No Known Problems Father      Outpatient Encounter Medications as of 5/6/2022   Medication Sig Dispense Refill    losartan (COZAAR) 50 mg tablet TAKE 1 TABLET DAILY 90 Tablet 1    tolterodine (DetroL) 2 mg tablet Take 1 Tablet by mouth daily. 90 Tablet 1    tamsulosin (FLOMAX) 0.4 mg capsule Take 1 Capsule by mouth daily. 90 Capsule 1    hydroCHLOROthiazide (HYDRODIURIL) 25 mg tablet Take 1 Tablet by mouth daily.  90 Tablet 1    simvastatin (ZOCOR) 40 mg tablet TAKE 1 TABLET NIGHTLY 90 Tablet 3    LUTEIN PO Take 25 mg by mouth daily.  aspirin delayed-release 81 mg tablet Take 81 mg by mouth daily.  Omega-3-DHA-EPA-Fish Oil 1,000 (120-180) mg Cap Take 1,200 mg by mouth daily.  PV W-O MILLICENT/FERROUS FUMARATE/FA (M-VIT PO) Take 1 Tab by mouth. Takes one multivitamin po daily. Does not have iron. No facility-administered encounter medications on file as of 5/6/2022. HPI    Review of Systems   Constitutional: Negative. Eyes: Negative for blurred vision, photophobia and pain. Respiratory: Negative. Cardiovascular: Negative. Musculoskeletal: Positive for joint pain. Neurological: Negative for dizziness and headaches. Psychiatric/Behavioral: Negative. Physical Exam  Vitals and nursing note reviewed. HENT:      Head:      Comments: Clean intact sutures to left forehead, no signs of infection. Eyes:      Pupils: Pupils are equal, round, and reactive to light. Cardiovascular:      Rate and Rhythm: Normal rate and regular rhythm. Pulmonary:      Effort: Pulmonary effort is normal.   Abdominal:      Palpations: Abdomen is soft. Musculoskeletal:      Comments: Pain when flexing and extending wrist. Nos swelling or redness.  equal bilaterally    Skin:     General: Skin is warm. Neurological:      Mental Status: He is alert and oriented to person, place, and time. Psychiatric:         Behavior: Behavior normal.         ASSESSMENT and PLAN  Diagnoses and all orders for this visit:    1. Visit for suture removal  -     REMOVAL OF SUTURES    2. Right wrist pain        -     Continue with brace         -      Tylenol every 8 hours as needed   3.  Hypertension, unspecified type      Follow-up and Dispositions    · Return if symptoms worsen or fail to improve.       lab results and schedule of future lab studies reviewed with patient  reviewed diet, exercise and weight control  reviewed medications and side effects in detail

## 2022-05-06 NOTE — PROGRESS NOTES
[facility] Maintenance Due   Topic Date Due    DTaP/Tdap/Td series (1 - Tdap) 12/14/2011       Chief Complaint   Patient presents with    Hypertension    Cholesterol Problem    Fall       1. Have you been to the ER, urgent care clinic since your last visit? Hospitalized since your last visit? Yes, 5/1/22 and 5/2/22., Fall / laceration of head / and sprained R wrist  [facility]     2. Have you seen or consulted any other health care providers outside of the [address] [place] [address] since your last visit? Include any pap smears or colon screening. No    3) Do you have an Advance Directive on file? no    4) Are you interested in receiving information on Advance Directives? NO      Patient is accompanied by self I have received verbal consent from [address] [place] [address] to discuss any/all medical information while they are present in the room.

## 2022-06-10 RX ORDER — TOLTERODINE TARTRATE 2 MG/1
TABLET, EXTENDED RELEASE ORAL
Qty: 90 TABLET | Refills: 3 | Status: SHIPPED | OUTPATIENT
Start: 2022-06-10

## 2022-06-10 RX ORDER — HYDROCHLOROTHIAZIDE 25 MG/1
TABLET ORAL
Qty: 90 TABLET | Refills: 3 | Status: SHIPPED | OUTPATIENT
Start: 2022-06-10

## 2022-06-29 LAB
ALBUMIN SERPL-MCNC: 4.7 G/DL (ref 3.7–4.7)
ALBUMIN/GLOB SERPL: 2 {RATIO} (ref 1.2–2.2)
ALP SERPL-CCNC: 67 IU/L (ref 44–121)
ALT SERPL-CCNC: 31 IU/L (ref 0–44)
AST SERPL-CCNC: 26 IU/L (ref 0–40)
BASOPHILS # BLD AUTO: 0 X10E3/UL (ref 0–0.2)
BASOPHILS NFR BLD AUTO: 1 %
BILIRUB SERPL-MCNC: 0.5 MG/DL (ref 0–1.2)
BUN SERPL-MCNC: 11 MG/DL (ref 8–27)
BUN/CREAT SERPL: 11 (ref 10–24)
CALCIUM SERPL-MCNC: 9.3 MG/DL (ref 8.6–10.2)
CHLORIDE SERPL-SCNC: 104 MMOL/L (ref 96–106)
CHOLEST SERPL-MCNC: 124 MG/DL (ref 100–199)
CO2 SERPL-SCNC: 22 MMOL/L (ref 20–29)
CREAT SERPL-MCNC: 0.96 MG/DL (ref 0.76–1.27)
EGFR: 82 ML/MIN/1.73
EOSINOPHIL # BLD AUTO: 0.1 X10E3/UL (ref 0–0.4)
EOSINOPHIL NFR BLD AUTO: 2 %
ERYTHROCYTE [DISTWIDTH] IN BLOOD BY AUTOMATED COUNT: 13.2 % (ref 11.6–15.4)
EST. AVERAGE GLUCOSE BLD GHB EST-MCNC: 128 MG/DL
GLOBULIN SER CALC-MCNC: 2.4 G/DL (ref 1.5–4.5)
GLUCOSE SERPL-MCNC: 136 MG/DL (ref 65–99)
HBA1C MFR BLD: 6.1 % (ref 4.8–5.6)
HCT VFR BLD AUTO: 42.3 % (ref 37.5–51)
HDLC SERPL-MCNC: 39 MG/DL
HGB BLD-MCNC: 13.4 G/DL (ref 13–17.7)
IMM GRANULOCYTES # BLD AUTO: 0 X10E3/UL (ref 0–0.1)
IMM GRANULOCYTES NFR BLD AUTO: 0 %
IMP & REVIEW OF LAB RESULTS: NORMAL
LDLC SERPL CALC-MCNC: 67 MG/DL (ref 0–99)
LYMPHOCYTES # BLD AUTO: 2.5 X10E3/UL (ref 0.7–3.1)
LYMPHOCYTES NFR BLD AUTO: 49 %
MCH RBC QN AUTO: 27.2 PG (ref 26.6–33)
MCHC RBC AUTO-ENTMCNC: 31.7 G/DL (ref 31.5–35.7)
MCV RBC AUTO: 86 FL (ref 79–97)
MONOCYTES # BLD AUTO: 0.5 X10E3/UL (ref 0.1–0.9)
MONOCYTES NFR BLD AUTO: 10 %
NEUTROPHILS # BLD AUTO: 2 X10E3/UL (ref 1.4–7)
NEUTROPHILS NFR BLD AUTO: 38 %
PLATELET # BLD AUTO: 294 X10E3/UL (ref 150–450)
POTASSIUM SERPL-SCNC: 3.9 MMOL/L (ref 3.5–5.2)
PROT SERPL-MCNC: 7.1 G/DL (ref 6–8.5)
RBC # BLD AUTO: 4.93 X10E6/UL (ref 4.14–5.8)
SODIUM SERPL-SCNC: 141 MMOL/L (ref 134–144)
TRIGL SERPL-MCNC: 93 MG/DL (ref 0–149)
VLDLC SERPL CALC-MCNC: 18 MG/DL (ref 5–40)
WBC # BLD AUTO: 5.1 X10E3/UL (ref 3.4–10.8)

## 2022-07-06 ENCOUNTER — OFFICE VISIT (OUTPATIENT)
Dept: INTERNAL MEDICINE CLINIC | Age: 77
End: 2022-07-06
Payer: MEDICARE

## 2022-07-06 VITALS
OXYGEN SATURATION: 96 % | DIASTOLIC BLOOD PRESSURE: 88 MMHG | SYSTOLIC BLOOD PRESSURE: 138 MMHG | HEART RATE: 78 BPM | TEMPERATURE: 98.3 F | RESPIRATION RATE: 12 BRPM | BODY MASS INDEX: 27.13 KG/M2 | WEIGHT: 183.2 LBS | HEIGHT: 69 IN

## 2022-07-06 DIAGNOSIS — R73.03 PREDIABETES: ICD-10-CM

## 2022-07-06 DIAGNOSIS — R39.14 BENIGN PROSTATIC HYPERPLASIA WITH INCOMPLETE BLADDER EMPTYING: ICD-10-CM

## 2022-07-06 DIAGNOSIS — E78.00 PURE HYPERCHOLESTEROLEMIA: ICD-10-CM

## 2022-07-06 DIAGNOSIS — N40.1 BENIGN PROSTATIC HYPERPLASIA WITH INCOMPLETE BLADDER EMPTYING: ICD-10-CM

## 2022-07-06 DIAGNOSIS — I10 ESSENTIAL HYPERTENSION: Primary | ICD-10-CM

## 2022-07-06 DIAGNOSIS — I87.2 VENOUS INSUFFICIENCY OF BOTH LOWER EXTREMITIES: ICD-10-CM

## 2022-07-06 PROCEDURE — 1101F PT FALLS ASSESS-DOCD LE1/YR: CPT | Performed by: INTERNAL MEDICINE

## 2022-07-06 PROCEDURE — G8754 DIAS BP LESS 90: HCPCS | Performed by: INTERNAL MEDICINE

## 2022-07-06 PROCEDURE — G8417 CALC BMI ABV UP PARAM F/U: HCPCS | Performed by: INTERNAL MEDICINE

## 2022-07-06 PROCEDURE — G0463 HOSPITAL OUTPT CLINIC VISIT: HCPCS | Performed by: INTERNAL MEDICINE

## 2022-07-06 PROCEDURE — G8752 SYS BP LESS 140: HCPCS | Performed by: INTERNAL MEDICINE

## 2022-07-06 PROCEDURE — G8536 NO DOC ELDER MAL SCRN: HCPCS | Performed by: INTERNAL MEDICINE

## 2022-07-06 PROCEDURE — G8510 SCR DEP NEG, NO PLAN REQD: HCPCS | Performed by: INTERNAL MEDICINE

## 2022-07-06 PROCEDURE — G8427 DOCREV CUR MEDS BY ELIG CLIN: HCPCS | Performed by: INTERNAL MEDICINE

## 2022-07-06 PROCEDURE — 99214 OFFICE O/P EST MOD 30 MIN: CPT | Performed by: INTERNAL MEDICINE

## 2022-07-06 NOTE — PROGRESS NOTES
Health Maintenance Due   Topic Date Due    DTaP/Tdap/Td series (1 - Tdap) 12/14/2011       No chief complaint on file. 1. Have you been to the ER, urgent care clinic since your last visit? Hospitalized since your last visit? yes, 05/01/22  2. Have you seen or consulted any other health care providers outside of the [address] [place] [address] since your last visit? Include any pap smears or colon screening. Yes, Ally pierce    3) Do you have an Advance Directive on file? no    4) Are you interested in receiving information on Advance Directives? NO      Patient is accompanied by self I have received verbal consent from [address] [place] [address] to discuss any/all medical information while they are present in the room.

## 2022-07-06 NOTE — PROGRESS NOTES
HISTORY OF PRESENT ILLNESS  Torrie Trevizo is a 68 y.o. male. Pt. comes in for f/u. Has a few chronic medical issues as documented including HTN, HLD, prediabetes, BPH, venous insufficiency, DJD. Vital signs are stable. Has lost couple pounds with BMI 27.05. Reports recent fall. Had laceration of the left forehead. Went to ER had some sutures. Also sprained the right wrist.  Feeling much better without any further issues. Has been followed by ophthalmologist for dry eyes. Using OTC eyedrops and doing better. Vision stable. All chronic medical issues are stable on current treatment regimen. Has had Covid-19 vaccination. Reports taking proper precautions. Denies any related signs or symptoms. PMH/PSH/Allergies/Social History/medication list and most recent studies reviewed with patient. Recent A1c 6.1. All other labs are stable. Tobacco use: No  Alcohol use: Social    Reports compliance with medications and diet. Trying to be active physically to control weight. Reports no other new c/o. HPI    Review of Systems   Constitutional: Negative. HENT: Negative. Eyes: Negative for blurred vision. Respiratory: Negative for shortness of breath. Cardiovascular: Positive for leg swelling. Negative for chest pain. Gastrointestinal: Negative for abdominal pain. Genitourinary: Positive for urgency. Negative for dysuria. Musculoskeletal: Positive for joint pain. Negative for falls. Skin: Negative. Neurological: Negative for dizziness, sensory change, focal weakness and headaches. Endo/Heme/Allergies: Negative. Psychiatric/Behavioral: Negative. All other systems reviewed and are negative. Physical Exam  Vitals and nursing note reviewed. Constitutional:       General: He is not in acute distress. Appearance: He is well-developed. Comments: pleasant   HENT:      Head: Normocephalic and atraumatic.       Mouth/Throat:      Mouth: Mucous membranes are moist. Pharynx: Oropharynx is clear. Eyes:      General: No scleral icterus. Conjunctiva/sclera: Conjunctivae normal.   Neck:      Thyroid: No thyromegaly. Vascular: No carotid bruit or JVD. Cardiovascular:      Rate and Rhythm: Normal rate and regular rhythm. Heart sounds: Normal heart sounds. No murmur heard. Pulmonary:      Effort: Pulmonary effort is normal. No respiratory distress. Breath sounds: Normal breath sounds. No wheezing or rales. Abdominal:      General: Bowel sounds are normal. There is no distension. Palpations: Abdomen is soft. Tenderness: There is no abdominal tenderness. There is no right CVA tenderness or left CVA tenderness. Musculoskeletal:         General: No tenderness. Cervical back: Normal range of motion and neck supple. Right lower leg: No edema. Left lower leg: No edema. Comments: djd  L knee surgical scar   Lymphadenopathy:      Cervical: No cervical adenopathy. Skin:     General: Skin is warm and dry. Findings: No rash. Comments: Distal leg hyperpigmentation changes   Neurological:      General: No focal deficit present. Mental Status: He is alert and oriented to person, place, and time. Coordination: Coordination normal.      Gait: Gait normal.   Psychiatric:         Behavior: Behavior normal.         ASSESSMENT and PLAN  Diagnoses and all orders for this visit:    1. Essential hypertension  -     METABOLIC PANEL, BASIC; Future  -     HEMOGLOBIN A1C WITH EAG; Future  Monitor BP at home with goal of 140/90 or less   Stable chronic condition. Continue current treatment/medications. 2. Pure hypercholesterolemia  Stable chronic condition. Continue current treatment/medications. 3. Prediabetes  -     METABOLIC PANEL, BASIC; Future  -     HEMOGLOBIN A1C WITH EAG; Future  Do's and don'ts of prediabetes discussed in detail  With diet especially sweets and carbs  Remain active physically  4.  Venous insufficiency of both lower extremities  stable  5. Benign prostatic hyperplasia with incomplete bladder emptying  Stable chronic condition. Continue current treatment/medications. See urologist as scheduled    Follow-up and Dispositions    · Return in about 6 months (around 1/6/2023). All chronic medical problems are stable  Continue with current medical management and plan  lab results and schedule of future lab studies reviewed with patient  reviewed diet, exercise and weight control  reviewed medications and side effects in detail  F/u with other MD's/ providers as scheduled  COVID-19 precautions discussed with pt  An After Visit Summary was printed and given to the patient.

## 2022-08-19 ENCOUNTER — OFFICE VISIT (OUTPATIENT)
Dept: INTERNAL MEDICINE CLINIC | Age: 77
End: 2022-08-19
Payer: MEDICARE

## 2022-08-19 VITALS
BODY MASS INDEX: 27.05 KG/M2 | WEIGHT: 182.6 LBS | OXYGEN SATURATION: 98 % | RESPIRATION RATE: 12 BRPM | SYSTOLIC BLOOD PRESSURE: 148 MMHG | HEIGHT: 69 IN | HEART RATE: 84 BPM | DIASTOLIC BLOOD PRESSURE: 82 MMHG

## 2022-08-19 DIAGNOSIS — I10 ESSENTIAL HYPERTENSION: ICD-10-CM

## 2022-08-19 DIAGNOSIS — M79.661 PAIN IN RIGHT LOWER LEG: Primary | ICD-10-CM

## 2022-08-19 DIAGNOSIS — I87.2 VENOUS INSUFFICIENCY OF BOTH LOWER EXTREMITIES: ICD-10-CM

## 2022-08-19 PROCEDURE — G8753 SYS BP > OR = 140: HCPCS | Performed by: INTERNAL MEDICINE

## 2022-08-19 PROCEDURE — G8432 DEP SCR NOT DOC, RNG: HCPCS | Performed by: INTERNAL MEDICINE

## 2022-08-19 PROCEDURE — 1101F PT FALLS ASSESS-DOCD LE1/YR: CPT | Performed by: INTERNAL MEDICINE

## 2022-08-19 PROCEDURE — G8427 DOCREV CUR MEDS BY ELIG CLIN: HCPCS | Performed by: INTERNAL MEDICINE

## 2022-08-19 PROCEDURE — 99213 OFFICE O/P EST LOW 20 MIN: CPT | Performed by: INTERNAL MEDICINE

## 2022-08-19 PROCEDURE — G8536 NO DOC ELDER MAL SCRN: HCPCS | Performed by: INTERNAL MEDICINE

## 2022-08-19 PROCEDURE — G8754 DIAS BP LESS 90: HCPCS | Performed by: INTERNAL MEDICINE

## 2022-08-19 PROCEDURE — G8417 CALC BMI ABV UP PARAM F/U: HCPCS | Performed by: INTERNAL MEDICINE

## 2022-08-19 PROCEDURE — G0463 HOSPITAL OUTPT CLINIC VISIT: HCPCS | Performed by: INTERNAL MEDICINE

## 2022-08-19 NOTE — PROGRESS NOTES
Health Maintenance Due   Topic Date Due    DTaP/Tdap/Td series (1 - Tdap) 12/14/2011    COVID-19 Vaccine (4 - Booster for Pfizer series) 03/22/2022       Chief Complaint   Patient presents with    Leg Swelling     Right leg has a painful lump     Tingling    Hypertension       1. Have you been to the ER, urgent care clinic since your last visit? Hospitalized since your last visit? No    2. Have you seen or consulted any other health care providers outside of the [address] [place] [address] since your last visit? Include any pap smears or colon screening. No    3) Do you have an Advance Directive on file? no    4) Are you interested in receiving information on Advance Directives? NO      Patient is accompanied by self I have received verbal consent from [address] [place] [address] to discuss any/all medical information while they are present in the room.

## 2022-08-19 NOTE — PROGRESS NOTES
HISTORY OF PRESENT ILLNESS  Aletha Villanueva is a 68 y.o. male. Patient was seen for pain to the right lower like and an harden area. PMH of HTN, ED, and HTN. Patient reports that he has had this area for sometime, but the discomfort is worse. Has venous insuffiencey to the left leg, but no pain. Area does not swelling and pain is only located at the site. No falls or open area to the site. Takes OTC to help. No cough, CP or SOB  Visit Vitals  BP (!) 148/82   Pulse 84   Resp 12   Ht 5' 9\" (1.753 m)   Wt 182 lb 9.6 oz (82.8 kg)   SpO2 98%   BMI 26.97 kg/m²     Past Medical History:   Diagnosis Date    Arthritis     knee    ED (erectile dysfunction)     Hemorrhoids     Hypercholesterolemia     Hypertension     Other ill-defined conditions(799.89)     hx phlebitis left leg     Past Surgical History:   Procedure Laterality Date    HX CATARACT REMOVAL      bilateral then bilateral laser surgery to open back of eye to let more light in    HX HERNIA REPAIR      inguinal hernia repair    HX ORTHOPAEDIC      8 left knee surgeries - arthroscopies/ACL repair/knee replacement    HX RETINAL DETACHMENT REPAIR      2 right eye/1 left eye to repair retina and macula    HX TONSILLECTOMY      HX VASECTOMY       Family History   Problem Relation Age of Onset    Lung Disease Mother     No Known Problems Father      Outpatient Encounter Medications as of 8/19/2022   Medication Sig Dispense Refill    losartan (COZAAR) 50 mg tablet TAKE 1 TABLET DAILY 90 Tablet 1    hydroCHLOROthiazide (HYDRODIURIL) 25 mg tablet TAKE 1 TABLET DAILY 90 Tablet 3    tolterodine (DETROL) 2 mg tablet TAKE 1 TABLET DAILY 90 Tablet 3    tamsulosin (FLOMAX) 0.4 mg capsule Take 1 Capsule by mouth daily. 90 Capsule 1    simvastatin (ZOCOR) 40 mg tablet TAKE 1 TABLET NIGHTLY 90 Tablet 3    LUTEIN PO Take 25 mg by mouth daily. aspirin delayed-release 81 mg tablet Take 81 mg by mouth daily. PV W-O MILLICENT/FERROUS FUMARATE/FA (M-VIT PO) Take 1 Tab by mouth. Takes one multivitamin po daily. Does not have iron. No facility-administered encounter medications on file as of 8/19/2022. HPI    Review of Systems   Constitutional: Negative. Respiratory: Negative. Cardiovascular: Negative. Musculoskeletal:  Positive for joint pain. Neurological: Negative. Physical Exam  Vitals and nursing note reviewed. Cardiovascular:      Rate and Rhythm: Normal rate and regular rhythm. Pulses: Normal pulses. Pulmonary:      Effort: Pulmonary effort is normal.      Breath sounds: Normal breath sounds. Abdominal:      Palpations: Abdomen is soft. Musculoskeletal:      Right lower leg: No edema. Left lower leg: No edema. Legs:       Comments: Area is indurated and when pain when palpated. No swelling or calf pain. Area is discolored     Venous insuff. To left lower leg   Skin:     General: Skin is warm. Neurological:      Mental Status: He is alert and oriented to person, place, and time. Psychiatric:         Behavior: Behavior normal.       ASSESSMENT and PLAN  Diagnoses and all orders for this visit:    1. Pain in right lower leg  -     US EXT NONVAS RT COMP; Future    2. Venous insufficiency of both lower extremities    3.  Essential hypertension      Follow-up and Dispositions    Return if symptoms worsen or fail to improve.       lab results and schedule of future lab studies reviewed with patient  reviewed diet, exercise and weight control  reviewed medications and side effects in detail

## 2022-08-26 ENCOUNTER — HOSPITAL ENCOUNTER (OUTPATIENT)
Dept: ULTRASOUND IMAGING | Age: 77
Discharge: HOME | End: 2022-08-26
Attending: INTERNAL MEDICINE
Payer: MEDICARE

## 2022-08-26 DIAGNOSIS — M79.661 PAIN IN RIGHT LOWER LEG: ICD-10-CM

## 2022-08-26 PROCEDURE — 76882 US LMTD JT/FCL EVL NVASC XTR: CPT

## 2022-08-31 ENCOUNTER — TELEPHONE (OUTPATIENT)
Dept: INTERNAL MEDICINE CLINIC | Age: 77
End: 2022-08-31

## 2022-08-31 DIAGNOSIS — I87.2 VENOUS INSUFFICIENCY OF BOTH LOWER EXTREMITIES: Primary | ICD-10-CM

## 2022-08-31 DIAGNOSIS — M79.604 PAIN OF RIGHT LOWER EXTREMITY: ICD-10-CM

## 2022-08-31 NOTE — TELEPHONE ENCOUNTER
Zakiya Burgos NP   8/26/2022  4:37 PM EDT       Edema but not fluid collection. Monitor , elevated legs when resting       \"TECHNIQUE: Grayscale ultrasound images of the right medial calf     FINDINGS: In the area of concern, there is mild subcutaneous edema no fluid  collection, mass, or lymphadenopathy. IMPRESSION  Mild subcutaneous edema. No mass or fluid collection.  \"

## 2022-08-31 NOTE — TELEPHONE ENCOUNTER
Cecelia Parra was called and verbalized understanding on note below. Aliciaken Setter is still concerned with the pain on contact with the lump on his leg, still feels a mass, mass throbs at night especially (sometimes still burns as well). Please advise if follow up is needed.

## 2022-08-31 NOTE — TELEPHONE ENCOUNTER
Christine Rojas, NP  You 1 hour ago (12:31 PM)     SH  The next step due to the location and other area to the opposite leg would be a vascular referral

## 2022-08-31 NOTE — TELEPHONE ENCOUNTER
Patient called stating that he received the results from his ultrasound but the impression is different than what the ultrasound tech told him. They told him that he has varicose veins,tissue damage and fluid retention. Please call him back at [phone]

## 2022-12-19 RX ORDER — SIMVASTATIN 40 MG/1
TABLET, FILM COATED ORAL
Qty: 90 TABLET | Refills: 3 | Status: SHIPPED | OUTPATIENT
Start: 2022-12-19

## 2022-12-23 LAB
BUN SERPL-MCNC: 21 MG/DL (ref 8–27)
BUN/CREAT SERPL: 24 (ref 10–24)
CALCIUM SERPL-MCNC: 9.8 MG/DL (ref 8.6–10.2)
CHLORIDE SERPL-SCNC: 106 MMOL/L (ref 96–106)
CO2 SERPL-SCNC: 24 MMOL/L (ref 20–29)
CREAT SERPL-MCNC: 0.89 MG/DL (ref 0.76–1.27)
EGFR: 88 ML/MIN/1.73
EST. AVERAGE GLUCOSE BLD GHB EST-MCNC: 114 MG/DL
GLUCOSE SERPL-MCNC: 109 MG/DL (ref 70–99)
HBA1C MFR BLD: 5.6 % (ref 4.8–5.6)
POTASSIUM SERPL-SCNC: 4.2 MMOL/L (ref 3.5–5.2)
SODIUM SERPL-SCNC: 142 MMOL/L (ref 134–144)

## 2023-01-11 ENCOUNTER — OFFICE VISIT (OUTPATIENT)
Dept: INTERNAL MEDICINE CLINIC | Age: 78
End: 2023-01-11
Payer: MEDICARE

## 2023-01-11 VITALS
BODY MASS INDEX: 26.22 KG/M2 | TEMPERATURE: 98 F | WEIGHT: 177 LBS | HEIGHT: 69 IN | DIASTOLIC BLOOD PRESSURE: 74 MMHG | HEART RATE: 83 BPM | SYSTOLIC BLOOD PRESSURE: 118 MMHG | RESPIRATION RATE: 16 BRPM | OXYGEN SATURATION: 95 %

## 2023-01-11 DIAGNOSIS — R73.03 PREDIABETES: ICD-10-CM

## 2023-01-11 DIAGNOSIS — N40.1 BENIGN PROSTATIC HYPERPLASIA WITH INCOMPLETE BLADDER EMPTYING: ICD-10-CM

## 2023-01-11 DIAGNOSIS — R39.14 BENIGN PROSTATIC HYPERPLASIA WITH INCOMPLETE BLADDER EMPTYING: ICD-10-CM

## 2023-01-11 DIAGNOSIS — I10 ESSENTIAL HYPERTENSION: Primary | ICD-10-CM

## 2023-01-11 DIAGNOSIS — Z00.00 MEDICARE ANNUAL WELLNESS VISIT, SUBSEQUENT: ICD-10-CM

## 2023-01-11 DIAGNOSIS — E78.00 PURE HYPERCHOLESTEROLEMIA: ICD-10-CM

## 2023-01-11 DIAGNOSIS — I87.2 VENOUS INSUFFICIENCY OF BOTH LOWER EXTREMITIES: ICD-10-CM

## 2023-01-11 PROCEDURE — G8417 CALC BMI ABV UP PARAM F/U: HCPCS | Performed by: INTERNAL MEDICINE

## 2023-01-11 PROCEDURE — G8427 DOCREV CUR MEDS BY ELIG CLIN: HCPCS | Performed by: INTERNAL MEDICINE

## 2023-01-11 PROCEDURE — 1101F PT FALLS ASSESS-DOCD LE1/YR: CPT | Performed by: INTERNAL MEDICINE

## 2023-01-11 PROCEDURE — G8510 SCR DEP NEG, NO PLAN REQD: HCPCS | Performed by: INTERNAL MEDICINE

## 2023-01-11 PROCEDURE — 99214 OFFICE O/P EST MOD 30 MIN: CPT | Performed by: INTERNAL MEDICINE

## 2023-01-11 PROCEDURE — G0439 PPPS, SUBSEQ VISIT: HCPCS | Performed by: INTERNAL MEDICINE

## 2023-01-11 PROCEDURE — G8536 NO DOC ELDER MAL SCRN: HCPCS | Performed by: INTERNAL MEDICINE

## 2023-01-11 PROCEDURE — G0463 HOSPITAL OUTPT CLINIC VISIT: HCPCS | Performed by: INTERNAL MEDICINE

## 2023-01-11 RX ORDER — LOSARTAN POTASSIUM 50 MG/1
50 TABLET ORAL DAILY
Qty: 90 TABLET | Refills: 3 | Status: SHIPPED | OUTPATIENT
Start: 2023-01-11 | End: 2023-01-11

## 2023-01-11 RX ORDER — GLUCOSAM/CHONDRO/HERB 149/HYAL 750-100 MG
1 TABLET ORAL DAILY
COMMUNITY

## 2023-01-11 RX ORDER — DICLOFENAC SODIUM 75 MG/1
75 TABLET, DELAYED RELEASE ORAL 2 TIMES DAILY
Qty: 90 TABLET | Refills: 3 | Status: SHIPPED | OUTPATIENT
Start: 2023-01-11

## 2023-01-11 RX ORDER — TADALAFIL 5 MG/1
5 TABLET ORAL
COMMUNITY

## 2023-01-11 RX ORDER — LOSARTAN POTASSIUM 50 MG/1
TABLET ORAL
Qty: 90 TABLET | Refills: 3 | Status: SHIPPED | OUTPATIENT
Start: 2023-01-11

## 2023-01-11 RX ORDER — HYDROCHLOROTHIAZIDE 25 MG/1
25 TABLET ORAL DAILY
Qty: 90 TABLET | Refills: 3 | Status: SHIPPED | OUTPATIENT
Start: 2023-01-11

## 2023-01-11 NOTE — PROGRESS NOTES
Health Maintenance Due   Topic Date Due    DTaP/Tdap/Td series (1 - Tdap) 12/14/2011    COVID-19 Vaccine (4 - Booster for Pfizer series) 01/17/2022    Flu Vaccine (1) 08/01/2022    Medicare Yearly Exam  01/12/2023       Chief Complaint   Patient presents with    Hypertension    Cholesterol Problem    Benign Prostatic Hypertrophy       1. Have you been to the ER, urgent care clinic since your last visit? Hospitalized since your last visit? No    2. Have you seen or consulted any other health care providers outside of the [address] [place] [address] since your last visit? Include any pap smears or colon screening. No    3) Do you have an Advance Directive on file? no    4) Are you interested in receiving information on Advance Directives? NO      Patient is accompanied by self I have received verbal consent from [address] [place] [address] to discuss any/all medical information while they are present in the room.

## 2023-01-11 NOTE — PROGRESS NOTES
Subjective  Kent Goodpasture is a 68 y.o. male. Pt. comes in for f/u. Has a few chronic medical issues as documented. Reports having right saphenous vein cauterization since last visit. Right leg pain has improved. Edema is stable. Followed by urologist for BPH. Remains on Detrol and Flomax. Cialis has been added. Is expensive and is planning to stop it. All chronic medical issues are stable on current treatment regimen. Denies any issues with  Covid-19 vaccination. PMH/PSH/Allergies/Social History/medication list and most recent studies reviewed with patient. Tobacco use: No  Alcohol use: Social  Reports compliance with medications and diet. Trying to be active physically to control weight. Reports no other new c/o. Past Medical History:   Diagnosis Date    Arthritis     knee    ED (erectile dysfunction)     Hemorrhoids     Hypercholesterolemia     Hypertension     Other ill-defined conditions(409.34)     hx phlebitis left leg       Allergies   Allergen Reactions    Bactrim [Sulfamethoprim Ds] Hives and Other (comments)     Nausea,sweating,cold sweats    Penicillins Swelling and Hives     Throat and face  Throat and face    Sulfamethoxazole-Trimethoprim Hives and Unknown (comments)     Nausea,sweating,cold sweats    Lisinopril Angioedema and Swelling    Sulfamethoxazole Hives       Current Outpatient Medications on File Prior to Visit   Medication Sig Dispense Refill    tadalafiL (CIALIS) 5 mg tablet Take 5 mg by mouth daily as needed for Erectile Dysfunction. omega 3-DHA-EPA-fish oil 1,000 mg (120 mg-180 mg) capsule Take 1 Capsule by mouth daily. simvastatin (ZOCOR) 40 mg tablet TAKE 1 TABLET NIGHTLY 90 Tablet 3    tolterodine (DETROL) 2 mg tablet TAKE 1 TABLET DAILY 90 Tablet 3    tamsulosin (FLOMAX) 0.4 mg capsule Take 1 Capsule by mouth daily. 90 Capsule 1    LUTEIN PO Take 25 mg by mouth daily. aspirin delayed-release 81 mg tablet Take 81 mg by mouth daily.       PV W-O MILLICENT/FERROUS FUMARATE/FA (M-VIT PO) Take 1 Tab by mouth. Takes one multivitamin po daily. Does not have iron. [DISCONTINUED] losartan (COZAAR) 50 mg tablet TAKE 1 TABLET DAILY 90 Tablet 1    [DISCONTINUED] hydroCHLOROthiazide (HYDRODIURIL) 25 mg tablet TAKE 1 TABLET DAILY 90 Tablet 3     No current facility-administered medications on file prior to visit. Visit Vitals  Blood Pressure 118/74 (BP 1 Location: Left upper arm, BP Patient Position: Sitting, BP Cuff Size: Adult)   Pulse 83   Temperature 98 °F (36.7 °C) (Oral)   Respiration 16   Height 5' 9\" (1.753 m)   Weight 177 lb (80.3 kg)   Oxygen Saturation 95%   Body Mass Index 26.14 kg/m²     HPI  Review of Systems   Constitutional: Negative. HENT: Negative. Eyes:  Negative for blurred vision. Respiratory:  Negative for shortness of breath. Cardiovascular:  Positive for leg swelling. Negative for chest pain. Gastrointestinal:  Negative for abdominal pain. Genitourinary:  Positive for urgency. Negative for dysuria. Musculoskeletal:  Positive for joint pain. Negative for falls. Skin: Negative. Neurological:  Negative for dizziness, sensory change, focal weakness and headaches. Endo/Heme/Allergies: Negative. Psychiatric/Behavioral: Negative. All other systems reviewed and are negative. Objective  Physical Exam  Vitals and nursing note reviewed. Constitutional:       General: He is not in acute distress. Appearance: He is well-developed. Comments: pleasant   HENT:      Head: Normocephalic and atraumatic. Mouth/Throat:      Mouth: Mucous membranes are moist.      Pharynx: Oropharynx is clear. Eyes:      General: No scleral icterus. Conjunctiva/sclera: Conjunctivae normal.   Neck:      Thyroid: No thyromegaly. Vascular: No carotid bruit or JVD. Cardiovascular:      Rate and Rhythm: Normal rate and regular rhythm. Heart sounds: Normal heart sounds. No murmur heard.   Pulmonary:      Effort: Pulmonary effort is normal. No respiratory distress. Breath sounds: Normal breath sounds. No wheezing or rales. Abdominal:      General: Bowel sounds are normal. There is no distension. Palpations: Abdomen is soft. Tenderness: There is no abdominal tenderness. There is no right CVA tenderness or left CVA tenderness. Musculoskeletal:         General: No tenderness. Cervical back: Normal range of motion and neck supple. Right lower leg: No edema. Left lower leg: No edema. Comments: djd  L knee surgical scar   Lymphadenopathy:      Cervical: No cervical adenopathy. Skin:     General: Skin is warm and dry. Findings: No rash. Comments: Distal leg hyperpigmentation changes   Neurological:      General: No focal deficit present. Mental Status: He is alert and oriented to person, place, and time. Coordination: Coordination normal.      Gait: Gait normal.   Psychiatric:         Behavior: Behavior normal.        Assessment & Plan    ICD-10-CM ICD-9-CM    1. Essential hypertension  I10 401.9 CT HEART W/O CONT WITH CALCIUM      2. Pure hypercholesterolemia  E78.00 272.0 LIPID PANEL      METABOLIC PANEL, COMPREHENSIVE      CBC W/O DIFF      CT HEART W/O CONT WITH CALCIUM      3. Prediabetes  R73.03 790.29 HEMOGLOBIN A1C WITH EAG      CT HEART W/O CONT WITH CALCIUM      4. Venous insufficiency of both lower extremities  I87.2 459.81       5. Benign prostatic hyperplasia with incomplete bladder emptying  N40.1 600.01     R39.14 788.21       6.  Medicare annual wellness visit, subsequent  Z00.00 V70.0         Orders Placed This Encounter    CT HEART W/O CONT WITH CALCIUM     Standing Status:   Future     Standing Expiration Date:   2/11/2024    LIPID PANEL     Standing Status:   Future     Standing Expiration Date:   4/02/3472    METABOLIC PANEL, COMPREHENSIVE     Standing Status:   Future     Standing Expiration Date:   1/11/2024    CBC W/O DIFF     Standing Status: Future     Standing Expiration Date:   1/11/2024    HEMOGLOBIN A1C WITH EAG     Standing Status:   Future     Standing Expiration Date:   1/11/2024    losartan (COZAAR) 50 mg tablet     Sig: Take 1 Tablet by mouth daily. Dispense:  90 Tablet     Refill:  3    hydroCHLOROthiazide (HYDRODIURIL) 25 mg tablet     Sig: Take 1 Tablet by mouth daily. Dispense:  90 Tablet     Refill:  3    diclofenac EC (VOLTAREN) 75 mg EC tablet     Sig: Take 1 Tablet by mouth two (2) times a day. Dispense:  90 Tablet     Refill:  3     Follow-up and Dispositions    Return in about 6 months (around 7/11/2023). All chronic medical problems are stable  Continue with current medical management and plan  lab results and schedule of future lab studies reviewed with patient  reviewed diet, exercise and weight control  reviewed medications and side effects in detail  F/u with other MD's/ providers as scheduled  COVID-19 precautions discussed with pt  An After Visit Summary was printed and given to the patient.     Abad Dunbar, DO

## 2023-01-11 NOTE — PROGRESS NOTES
Schedule of Personalized Health Plan  (Provide Copy to Patient)  The best way to stay healthy is to live a healthy lifestyle. A healthy lifestyle includes regular exercise, eating a well-balanced diet, keeping a healthy weight and not smoking. Regular physical exams and screening tests are another important way to take care of yourself. Preventive exams provided by health care providers can find health problems early when treatment works best and can keep you from getting certain diseases or illnesses. Preventive services include exams, lab tests, screenings, shots, monitoring and information to help you take care of your own health. All people over 65 should have a pneumonia shot. Pneumonia shots are usually only needed once in a lifetime unless your doctor decides differently. All people over 65 should have a yearly flu shot. People over 65 are at medium to high risk for Hepatitis B. Three shots are needed for complete protection. In addition to your physical exam, some screening tests are recommended:    Bone mass measurement (dexa scan) is recommended every two years if you have certain risk factors, such as personal history of vertebral fracture or chronic steroid medication use    Diabetes Mellitus screening is recommended every year. Glaucoma is an eye disease caused by high pressure in the eye. An eye exam is recommended every year. Cardiovascular screening tests that check your cholesterol and other blood fat (lipid) levels are recommended every five years. Colorectal Cancer screening tests help to find pre-cancerous polyps (growths in the colon) so they can be removed before they turn into cancer. Tests ordered for screening depend on your personal and family history risk factors.     Screening for Prostate Cancer is recommended yearly with a digital rectal exam and/or a PSA test    Here is a list of your current Health Maintenance items with a due date:  Health Maintenance Topic Date Due    DTaP/Tdap/Td series (1 - Tdap) 12/14/2011    COVID-19 Vaccine (4 - Booster for Pfizer series) 01/17/2022    Flu Vaccine (1) 08/01/2022    Lipid Screen  06/28/2023    Depression Screen  08/19/2023    Medicare Yearly Exam  01/12/2024    Shingles Vaccine  Completed    Pneumococcal 65+ years  Completed    Hepatitis C Screening  Addressed       This is the Subsequent Medicare Annual Wellness Exam, performed 12 months or more after the Initial AWV or the last Subsequent AWV    I have reviewed the patient's medical history in detail and updated the computerized patient record. Assessment/Plan   Education and counseling provided:  Are appropriate based on today's review and evaluation    1. Essential hypertension  2. Pure hypercholesterolemia  3. Prediabetes  4. Venous insufficiency of both lower extremities  5. Benign prostatic hyperplasia with incomplete bladder emptying  6. Medicare annual wellness visit, subsequent       Depression Risk Factor Screening     3 most recent PHQ Screens 1/11/2023   Little interest or pleasure in doing things Not at all   Feeling down, depressed, irritable, or hopeless Not at all   Total Score PHQ 2 0       Alcohol & Drug Abuse Risk Screen    Do you average more than 1 drink per night or more than 7 drinks a week: No    In the past three months have you have had more than 4 drinks containing alcohol on one occasion: No          Functional Ability and Level of Safety    Hearing: Hearing is good. Activities of Daily Living: The home contains: no safety equipment. Patient does total self care      Ambulation: with no difficulty     Fall Risk:  Fall Risk Assessment, last 12 mths 1/11/2023   Able to walk? Yes   Fall in past 12 months? 0   Do you feel unsteady? 0   Are you worried about falling 0   Is TUG test greater than 12 seconds? -   Is the gait abnormal? -   Number of falls in past 12 months -   Fall with injury?  -      Abuse Screen:  Patient is not abused Cognitive Screening    Has your family/caregiver stated any concerns about your memory: no     Cognitive Screening: A+O x 3    Health Maintenance Due     Health Maintenance Due   Topic Date Due    DTaP/Tdap/Td series (1 - Tdap) 12/14/2011    COVID-19 Vaccine (4 - Booster for Wagner Peter series) 01/17/2022    Flu Vaccine (1) 08/01/2022       Patient Care Team   Patient Care Team:  Maribel Currie DO as PCP - General  Maribel Currie DO as PCP - [facility]  Belen Cotton MD (Ophthalmology)  Lani Lawson MD (Inactive) (Gastroenterology)  Tr Bernard, RN as [facility] (Internal Medicine Physician)  Alana Velasco OD as Physician (Optometry)    History     Patient Active Problem List   Diagnosis Code    Essential hypertension I10    Pure hypercholesterolemia E78.00    Retinal detachment H33.20    DJD (degenerative joint disease) M19.90    ED (erectile dysfunction) N52.9    Hypotestosteronemia in male E29.1    Venous insufficiency of leg I87.2    S/P TKR (total knee replacement) Z96.659    BPH (benign prostatic hyperplasia) N40.0    ACE inhibitor-aggravated angioedema T78. 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    Seborrheic dermatitis L21.9    Hyperglycemia R73.9    ACP (advance care planning) Z71.89    Trigger index finger of left hand M65.322    Wrist swelling, left M25.432    History of skin cancer Z85.828    Prediabetes R73.03     Past Medical History:   Diagnosis Date    Arthritis     knee    ED (erectile dysfunction)     Hemorrhoids     Hypercholesterolemia     Hypertension     Other ill-defined conditions(799.89)     hx phlebitis left leg      Past Surgical History:   Procedure Laterality Date    HX CATARACT REMOVAL      bilateral then bilateral laser surgery to open back of eye to let more light in    HX HERNIA REPAIR      inguinal hernia repair    HX ORTHOPAEDIC      8 left knee surgeries - arthroscopies/ACL repair/knee replacement    HX RETINAL DETACHMENT REPAIR      2 right eye/1 left eye to repair retina and macula    HX TONSILLECTOMY      HX VASECTOMY      AR UNLISTED PROCEDURE VASCULAR SURGERY       Current Outpatient Medications   Medication Sig Dispense Refill    tadalafiL (CIALIS) 5 mg tablet Take 5 mg by mouth daily as needed for Erectile Dysfunction. omega 3-DHA-EPA-fish oil 1,000 mg (120 mg-180 mg) capsule Take 1 Capsule by mouth daily. losartan (COZAAR) 50 mg tablet Take 1 Tablet by mouth daily. 90 Tablet 3    hydroCHLOROthiazide (HYDRODIURIL) 25 mg tablet Take 1 Tablet by mouth daily. 90 Tablet 3    diclofenac EC (VOLTAREN) 75 mg EC tablet Take 1 Tablet by mouth two (2) times a day. 90 Tablet 3    simvastatin (ZOCOR) 40 mg tablet TAKE 1 TABLET NIGHTLY 90 Tablet 3    tolterodine (DETROL) 2 mg tablet TAKE 1 TABLET DAILY 90 Tablet 3    tamsulosin (FLOMAX) 0.4 mg capsule Take 1 Capsule by mouth daily. 90 Capsule 1    LUTEIN PO Take 25 mg by mouth daily. aspirin delayed-release 81 mg tablet Take 81 mg by mouth daily. PV W-O MILLICENT/FERROUS FUMARATE/FA (M-VIT PO) Take 1 Tab by mouth. Takes one multivitamin po daily. Does not have iron. Allergies   Allergen Reactions    Bactrim [Sulfamethoprim Ds] Hives and Other (comments)     Nausea,sweating,cold sweats    Penicillins Swelling and Hives     Throat and face  Throat and face    Sulfamethoxazole-Trimethoprim Hives and Unknown (comments)     Nausea,sweating,cold sweats    Lisinopril Angioedema and Swelling    Sulfamethoxazole Hives       Family History   Problem Relation Age of Onset    Lung Disease Mother     No Known Problems Father      Social History     Tobacco Use    Smoking status: Some Days     Types: Cigars    Smokeless tobacco: Never    Tobacco comments:     occasional cigar   Substance Use Topics    Alcohol use:  Yes     Alcohol/week: 0.8 standard drinks     Types: 1 Glasses of wine per week         Luz Maria Chandra DO

## 2023-01-25 ENCOUNTER — HOSPITAL ENCOUNTER (OUTPATIENT)
Dept: CT IMAGING | Age: 78
Discharge: HOME | End: 2023-01-25
Attending: INTERNAL MEDICINE
Payer: SELF-PAY

## 2023-01-25 DIAGNOSIS — I10 ESSENTIAL HYPERTENSION: ICD-10-CM

## 2023-01-25 DIAGNOSIS — E78.00 PURE HYPERCHOLESTEROLEMIA: ICD-10-CM

## 2023-01-25 DIAGNOSIS — R73.03 PREDIABETES: ICD-10-CM

## 2023-01-25 PROCEDURE — 75571 CT HRT W/O DYE W/CA TEST: CPT

## 2023-07-05 LAB
ERYTHROCYTE [DISTWIDTH] IN BLOOD BY AUTOMATED COUNT: 12.7 % (ref 11.6–15.4)
EST. AVERAGE GLUCOSE BLD GHB EST-MCNC: 108 MG/DL
HBA1C MFR BLD: 5.4 % (ref 4.8–5.6)
HCT VFR BLD AUTO: 40.8 % (ref 37.5–51)
HGB BLD-MCNC: 13.5 G/DL (ref 13–17.7)
MCH RBC QN AUTO: 31.3 PG (ref 26.6–33)
MCHC RBC AUTO-ENTMCNC: 33.1 G/DL (ref 31.5–35.7)
MCV RBC AUTO: 94 FL (ref 79–97)
PLATELET # BLD AUTO: 206 X10E3/UL (ref 150–450)
RBC # BLD AUTO: 4.32 X10E6/UL (ref 4.14–5.8)
WBC # BLD AUTO: 7.4 X10E3/UL (ref 3.4–10.8)

## 2023-07-06 LAB
ALBUMIN SERPL-MCNC: 4.1 G/DL (ref 3.7–4.7)
ALBUMIN/GLOB SERPL: 1.6 {RATIO} (ref 1.2–2.2)
ALP SERPL-CCNC: 69 IU/L (ref 44–121)
ALT SERPL-CCNC: 42 IU/L (ref 0–44)
AST SERPL-CCNC: 43 IU/L (ref 0–40)
BILIRUB SERPL-MCNC: 0.3 MG/DL (ref 0–1.2)
BUN SERPL-MCNC: 33 MG/DL (ref 8–27)
BUN/CREAT SERPL: 38 (ref 10–24)
CALCIUM SERPL-MCNC: 9.8 MG/DL (ref 8.6–10.2)
CHLORIDE SERPL-SCNC: 106 MMOL/L (ref 96–106)
CHOLEST SERPL-MCNC: 134 MG/DL (ref 100–199)
CO2 SERPL-SCNC: 25 MMOL/L (ref 20–29)
CREAT SERPL-MCNC: 0.87 MG/DL (ref 0.76–1.27)
EGFRCR SERPLBLD CKD-EPI 2021: 89 ML/MIN/1.73
GLOBULIN SER CALC-MCNC: 2.5 G/DL (ref 1.5–4.5)
GLUCOSE SERPL-MCNC: 106 MG/DL (ref 70–99)
HDLC SERPL-MCNC: 53 MG/DL
IMP & REVIEW OF LAB RESULTS: NORMAL
LDLC SERPL CALC-MCNC: 60 MG/DL (ref 0–99)
POTASSIUM SERPL-SCNC: 4.3 MMOL/L (ref 3.5–5.2)
PROT SERPL-MCNC: 6.6 G/DL (ref 6–8.5)
SODIUM SERPL-SCNC: 143 MMOL/L (ref 134–144)
TRIGL SERPL-MCNC: 117 MG/DL (ref 0–149)
VLDLC SERPL CALC-MCNC: 21 MG/DL (ref 5–40)

## 2023-07-12 ENCOUNTER — OFFICE VISIT (OUTPATIENT)
Age: 78
End: 2023-07-12
Payer: MEDICARE

## 2023-07-12 VITALS
HEART RATE: 67 BPM | RESPIRATION RATE: 16 BRPM | BODY MASS INDEX: 25.33 KG/M2 | SYSTOLIC BLOOD PRESSURE: 126 MMHG | DIASTOLIC BLOOD PRESSURE: 72 MMHG | WEIGHT: 171 LBS | TEMPERATURE: 98 F | OXYGEN SATURATION: 99 % | HEIGHT: 69 IN

## 2023-07-12 DIAGNOSIS — I87.2 VENOUS INSUFFICIENCY OF BOTH LOWER EXTREMITIES: ICD-10-CM

## 2023-07-12 DIAGNOSIS — E78.00 PURE HYPERCHOLESTEROLEMIA: ICD-10-CM

## 2023-07-12 DIAGNOSIS — N40.1 BENIGN PROSTATIC HYPERPLASIA WITH NOCTURIA: ICD-10-CM

## 2023-07-12 DIAGNOSIS — I10 ESSENTIAL HYPERTENSION: Primary | ICD-10-CM

## 2023-07-12 DIAGNOSIS — R35.1 BENIGN PROSTATIC HYPERPLASIA WITH NOCTURIA: ICD-10-CM

## 2023-07-12 PROCEDURE — 3078F DIAST BP <80 MM HG: CPT | Performed by: INTERNAL MEDICINE

## 2023-07-12 PROCEDURE — 1123F ACP DISCUSS/DSCN MKR DOCD: CPT | Performed by: INTERNAL MEDICINE

## 2023-07-12 PROCEDURE — 99214 OFFICE O/P EST MOD 30 MIN: CPT | Performed by: INTERNAL MEDICINE

## 2023-07-12 PROCEDURE — 3074F SYST BP LT 130 MM HG: CPT | Performed by: INTERNAL MEDICINE

## 2023-07-12 PROCEDURE — G8419 CALC BMI OUT NRM PARAM NOF/U: HCPCS | Performed by: INTERNAL MEDICINE

## 2023-07-12 PROCEDURE — 4004F PT TOBACCO SCREEN RCVD TLK: CPT | Performed by: INTERNAL MEDICINE

## 2023-07-12 PROCEDURE — G8427 DOCREV CUR MEDS BY ELIG CLIN: HCPCS | Performed by: INTERNAL MEDICINE

## 2023-07-12 SDOH — ECONOMIC STABILITY: FOOD INSECURITY: WITHIN THE PAST 12 MONTHS, THE FOOD YOU BOUGHT JUST DIDN'T LAST AND YOU DIDN'T HAVE MONEY TO GET MORE.: NEVER TRUE

## 2023-07-12 SDOH — ECONOMIC STABILITY: FOOD INSECURITY: WITHIN THE PAST 12 MONTHS, YOU WORRIED THAT YOUR FOOD WOULD RUN OUT BEFORE YOU GOT MONEY TO BUY MORE.: NEVER TRUE

## 2023-07-12 SDOH — ECONOMIC STABILITY: HOUSING INSECURITY
IN THE LAST 12 MONTHS, WAS THERE A TIME WHEN YOU DID NOT HAVE A STEADY PLACE TO SLEEP OR SLEPT IN A SHELTER (INCLUDING NOW)?: NO

## 2023-07-12 SDOH — ECONOMIC STABILITY: INCOME INSECURITY: HOW HARD IS IT FOR YOU TO PAY FOR THE VERY BASICS LIKE FOOD, HOUSING, MEDICAL CARE, AND HEATING?: NOT VERY HARD

## 2023-07-12 ASSESSMENT — PATIENT HEALTH QUESTIONNAIRE - PHQ9
2. FEELING DOWN, DEPRESSED OR HOPELESS: 0
1. LITTLE INTEREST OR PLEASURE IN DOING THINGS: 0
SUM OF ALL RESPONSES TO PHQ QUESTIONS 1-9: 0
SUM OF ALL RESPONSES TO PHQ QUESTIONS 1-9: 0
SUM OF ALL RESPONSES TO PHQ9 QUESTIONS 1 & 2: 0
SUM OF ALL RESPONSES TO PHQ QUESTIONS 1-9: 0
SUM OF ALL RESPONSES TO PHQ QUESTIONS 1-9: 0

## 2023-07-12 ASSESSMENT — ANXIETY QUESTIONNAIRES
IF YOU CHECKED OFF ANY PROBLEMS ON THIS QUESTIONNAIRE, HOW DIFFICULT HAVE THESE PROBLEMS MADE IT FOR YOU TO DO YOUR WORK, TAKE CARE OF THINGS AT HOME, OR GET ALONG WITH OTHER PEOPLE: NOT DIFFICULT AT ALL
GAD7 TOTAL SCORE: 0
3. WORRYING TOO MUCH ABOUT DIFFERENT THINGS: 0
4. TROUBLE RELAXING: 0
6. BECOMING EASILY ANNOYED OR IRRITABLE: 0
1. FEELING NERVOUS, ANXIOUS, OR ON EDGE: 0
2. NOT BEING ABLE TO STOP OR CONTROL WORRYING: 0
7. FEELING AFRAID AS IF SOMETHING AWFUL MIGHT HAPPEN: 0
5. BEING SO RESTLESS THAT IT IS HARD TO SIT STILL: 0

## 2023-07-12 ASSESSMENT — ENCOUNTER SYMPTOMS
EYES NEGATIVE: 1
ALLERGIC/IMMUNOLOGIC NEGATIVE: 1
ABDOMINAL PAIN: 0
SHORTNESS OF BREATH: 0
RESPIRATORY NEGATIVE: 1
GASTROINTESTINAL NEGATIVE: 1

## 2023-07-12 NOTE — PROGRESS NOTES
Subjective    Austin Reid is a 68 y.o. male who presents today for the following:  Chief Complaint   Patient presents with    Hypertension    Benign Prostatic Hypertrophy    Hyperlipidemia         Pt. comes in for f/u. Has a few chronic medical issues as documented. Overall has been feeling okay. BP and HLD are stable on medications. Denies any side effects. Followed by dermatology. Has a skin cancer on his nose. Plans for surgery. Has had previous lesions removed. Has chronic arthritic pains which are mostly stable. Takes diclofenac as needed. Has BPH. Followed by urologist.  On Detrol. Not using Cialis. Denies dysuria or hematuria. Lives alone and independent. All chronic medical issues are stable on current treatment regimen. Has had Covid-19 vaccination. Reports taking proper precautions. Denies any related signs or symptoms. PMH/PSH/Allergies/Social History/medication list and most recent studies reviewed with patient. Social History     Tobacco Use   Smoking Status Some Days   Smokeless Tobacco Never     Social History     Substance and Sexual Activity   Alcohol Use Yes    Alcohol/week: 0.8 standard drinks         Reports compliance with medications and diet. Trying to be active physically to control weight. Reports no other new c/o.      Past Medical History:   Diagnosis Date    Arthritis     knee    ED (erectile dysfunction)     Hemorrhoids     Hypercholesterolemia     Hypertension     Other ill-defined conditions(989.89)     hx phlebitis left leg       Allergies   Allergen Reactions    Penicillins Hives and Swelling     Throat and face  Throat and face    Sulfa Antibiotics Hives and Other (See Comments)     Nausea,sweating,cold sweats    Sulfamethoxazole-Trimethoprim Hives     Other reaction(s): Unknown (comments)  Nausea,sweating,cold sweats    Lisinopril Angioedema and Swelling    Sulfamethoxazole Hives        Current Outpatient Medications on File Prior to Visit

## 2023-07-12 NOTE — PROGRESS NOTES
1. \"Have you been to the ER, urgent care clinic since your last visit? Hospitalized since your last visit? \" No    2. \"Have you seen or consulted any other health care providers outside of the [address] [place] [address] since your last visit? \" No    3. For patients aged 43-73: Has the patient had a colonoscopy / FIT/ Cologuard? Recommendation: Colonoscopy every 10y or annual FIT test from 50-75 or every 3 year stool DNA based test with consideration of ongoing screening from 76-85. If the patient is female:    4. For patients aged 43-66: Has the patient had a mammogram within the past 2 years? No    5. For patients aged 21-65: Has the patient had a pap smear?  No

## 2023-08-01 DIAGNOSIS — I10 ESSENTIAL HYPERTENSION: Primary | ICD-10-CM

## 2023-08-02 RX ORDER — TOLTERODINE TARTRATE 2 MG/1
TABLET, EXTENDED RELEASE ORAL
Qty: 90 TABLET | Refills: 3 | Status: SHIPPED | OUTPATIENT
Start: 2023-08-02

## 2023-09-08 ENCOUNTER — HOSPITAL ENCOUNTER (EMERGENCY)
Facility: HOSPITAL | Age: 78
Discharge: HOME | End: 2023-09-08
Attending: EMERGENCY MEDICINE
Payer: MEDICARE

## 2023-09-08 VITALS
TEMPERATURE: 97.4 F | WEIGHT: 178.79 LBS | DIASTOLIC BLOOD PRESSURE: 109 MMHG | OXYGEN SATURATION: 99 % | BODY MASS INDEX: 26.4 KG/M2 | RESPIRATION RATE: 16 BRPM | SYSTOLIC BLOOD PRESSURE: 176 MMHG | HEART RATE: 77 BPM

## 2023-09-08 DIAGNOSIS — M79.605 LEFT LEG PAIN: Primary | ICD-10-CM

## 2023-09-08 PROCEDURE — 6360000002 HC RX W HCPCS: Performed by: EMERGENCY MEDICINE

## 2023-09-08 PROCEDURE — 99284 EMERGENCY DEPT VISIT MOD MDM: CPT

## 2023-09-08 PROCEDURE — 96372 THER/PROPH/DIAG INJ SC/IM: CPT

## 2023-09-08 RX ORDER — CYCLOBENZAPRINE HCL 10 MG
10 TABLET ORAL 3 TIMES DAILY PRN
Qty: 15 TABLET | Refills: 0 | Status: SHIPPED | OUTPATIENT
Start: 2023-09-08 | End: 2023-09-18

## 2023-09-08 RX ORDER — KETOROLAC TROMETHAMINE 30 MG/ML
30 INJECTION, SOLUTION INTRAMUSCULAR; INTRAVENOUS
Status: COMPLETED | OUTPATIENT
Start: 2023-09-08 | End: 2023-09-08

## 2023-09-08 RX ORDER — METHYLPREDNISOLONE 4 MG/1
TABLET ORAL
Qty: 1 KIT | Refills: 0 | Status: SHIPPED | OUTPATIENT
Start: 2023-09-08

## 2023-09-08 RX ADMIN — KETOROLAC TROMETHAMINE 30 MG: 30 INJECTION, SOLUTION INTRAMUSCULAR; INTRAVENOUS at 11:13

## 2023-09-08 ASSESSMENT — ENCOUNTER SYMPTOMS: BACK PAIN: 0

## 2023-09-08 NOTE — ED PROVIDER NOTES
SPT [address] ENCOUNTER      Patient Name: Jodee Borjas  MRN: [id number]  [address] [facility] 1945  Date of Evaluation: 9/8/2023  Physician: Dave Cage MD    CHIEF COMPLAINT       Chief Complaint   Patient presents with    Leg Pain       HISTORY OF PRESENT ILLNESS   (Location/Symptom, Timing/Onset, Context/Setting, Quality, Duration, Modifying Factors, Severity)   Josephine Tsai, 66 y.o., male     77-year-old male presents with left-sided leg pain. Patient reports the pain started several days ago in his left buttocks and radiates down the back of the leg. This morning he had several episodes where he felt like his leg was getting give out. He denies back pain, bowel or bladder dysfunction, fever, trauma. Nursing Notes were reviewed. REVIEW OF SYSTEMS    (Not required)   Review of Systems   Musculoskeletal:  Negative for back pain.      PAST MEDICAL HISTORY     Past Medical History:   Diagnosis Date    Arthritis     knee    ED (erectile dysfunction)     Hemorrhoids     Hypercholesterolemia     Hypertension     Other ill-defined conditions(799.89)     hx phlebitis left leg       SURGICAL HISTORY       Past Surgical History:   Procedure Laterality Date    CATARACT REMOVAL      bilateral then bilateral laser surgery to open back of eye to let more light in    HERNIA REPAIR      inguinal hernia repair    ORTHOPEDIC SURGERY      8 left knee surgeries - arthroscopies/ACL repair/knee replacement    RETINAL DETACHMENT SURGERY      2 right eye/1 left eye to repair retina and macula    SQUAMOUS CELL CARCINOMA EXCISION      Nose    TONSILLECTOMY      VASCULAR SURGERY      VASECTOMY         CURRENT MEDICATIONS       Previous Medications    ASPIRIN 81 MG EC TABLET    Take by mouth daily    DICLOFENAC (VOLTAREN) 75 MG EC TABLET    Take by mouth 2 times daily    HYDROCHLOROTHIAZIDE (HYDRODIURIL) 25 MG TABLET    Take by mouth daily    LOSARTAN (COZAAR) 50 MG TABLET    TAKE 1 TABLET DAILY

## 2023-09-08 NOTE — DISCHARGE INSTRUCTIONS
Thank you for allowing us to provide you with medical care today. We realize that you have many choices for your emergency care needs. We thank you for choosing [facility]. Please choose us in the future for any continued health care needs. The exam and treatment you received in the Emergency Department were for an emergent problem and are not intended as complete care. It is important that you follow up with a doctor, nurse practitioner, or physician's assistant for ongoing care. If your symptoms worsen or you do not improve as expected and you are unable to reach your usual health care provider, you should return to the Emergency Department. We are available 24 hours a day. Please make an appointment with your health care provider(s) for follow up of your Emergency Department visit. Take this sheet with you when you go to your follow-up visit.

## 2023-09-08 NOTE — ED NOTES
Pt discharged in stable condition at this time. MD and this RN reviewed discharge instructions, prescriptions, and follow up with patient at bedside. Pt verbalized understanding and denies any needs or questions at this time.         Beto Howard RN  09/08/23 [id number]

## 2023-09-08 NOTE — ED TRIAGE NOTES
Patient arrives ambulatory to the ED with complaints of left leg pain that started in June but has gotten worse. Describes the pain as sharp and states it radiates down his leg.

## 2023-09-11 RX ORDER — DICLOFENAC SODIUM 75 MG/1
75 TABLET, DELAYED RELEASE ORAL 2 TIMES DAILY
Qty: 90 TABLET | Refills: 1 | Status: SHIPPED | OUTPATIENT
Start: 2023-09-11

## 2023-10-12 ENCOUNTER — NURSE TRIAGE (OUTPATIENT)
Dept: OTHER | Facility: CLINIC | Age: 78
End: 2023-10-12

## 2023-10-12 NOTE — TELEPHONE ENCOUNTER
[facility]    Received call from Slava Gallego at [employer] with [email or web address]. Subjective: Caller states \"weakness\"     Current Symptoms:   -most recently pain continues, following period of mild improvement with steroid pack  -buttocks pain, lower back and radiation into left leg  -urinary urgency, not incontinence  -left leg \"buckling\" at times, denies falls/injury  -ER visit on 9/8 for same symptoms  -symptoms began mildly after extended car ride/travel in June    Onset: 1 month ago; unchanged    Pain Severity: 4/10; What has been tried:   -medrol dose pack completed  -muscle relaxer prescribed, he does not like to take  -ibuprofen    Recommended disposition: See PCP within 3 Days    Care advice provided, patient verbalizes understanding; denies any other questions or concerns; instructed to call back for any new or worsening symptoms. Patient/Caller agrees with recommended disposition; writer provided warm transfer to Reta Lo at [employer] for appointment scheduling    Attention Provider: Thank you for allowing me to participate in the care of your patient. The patient was connected to triage in response to information provided to the ECC/PSC. Please do not respond through this encounter as the response is not directed to a shared pool.     Reason for Disposition   [1] MODERATE back pain (e.g., interferes with normal activities) AND [2] present > 3 days    Protocols used: Back Pain-ADULT-

## 2023-10-20 ENCOUNTER — OFFICE VISIT (OUTPATIENT)
Age: 78
End: 2023-10-20
Payer: MEDICARE

## 2023-10-20 ENCOUNTER — HOSPITAL ENCOUNTER (OUTPATIENT)
Facility: HOSPITAL | Age: 78
End: 2023-10-20
Payer: MEDICARE

## 2023-10-20 VITALS
HEIGHT: 69 IN | DIASTOLIC BLOOD PRESSURE: 80 MMHG | OXYGEN SATURATION: 98 % | WEIGHT: 178 LBS | RESPIRATION RATE: 16 BRPM | SYSTOLIC BLOOD PRESSURE: 124 MMHG | HEART RATE: 81 BPM | BODY MASS INDEX: 26.36 KG/M2

## 2023-10-20 DIAGNOSIS — M54.31 BILATERAL SCIATICA: Primary | ICD-10-CM

## 2023-10-20 DIAGNOSIS — M79.18 BILATERAL BUTTOCK PAIN: ICD-10-CM

## 2023-10-20 DIAGNOSIS — M54.32 BILATERAL SCIATICA: ICD-10-CM

## 2023-10-20 DIAGNOSIS — I10 ESSENTIAL HYPERTENSION: ICD-10-CM

## 2023-10-20 DIAGNOSIS — I87.2 VENOUS INSUFFICIENCY OF BOTH LOWER EXTREMITIES: ICD-10-CM

## 2023-10-20 DIAGNOSIS — M54.31 BILATERAL SCIATICA: ICD-10-CM

## 2023-10-20 DIAGNOSIS — M54.32 BILATERAL SCIATICA: Primary | ICD-10-CM

## 2023-10-20 PROCEDURE — 99214 OFFICE O/P EST MOD 30 MIN: CPT | Performed by: INTERNAL MEDICINE

## 2023-10-20 PROCEDURE — 3079F DIAST BP 80-89 MM HG: CPT | Performed by: INTERNAL MEDICINE

## 2023-10-20 PROCEDURE — G8484 FLU IMMUNIZE NO ADMIN: HCPCS | Performed by: INTERNAL MEDICINE

## 2023-10-20 PROCEDURE — G8427 DOCREV CUR MEDS BY ELIG CLIN: HCPCS | Performed by: INTERNAL MEDICINE

## 2023-10-20 PROCEDURE — G8419 CALC BMI OUT NRM PARAM NOF/U: HCPCS | Performed by: INTERNAL MEDICINE

## 2023-10-20 PROCEDURE — 72100 X-RAY EXAM L-S SPINE 2/3 VWS: CPT

## 2023-10-20 PROCEDURE — 3074F SYST BP LT 130 MM HG: CPT | Performed by: INTERNAL MEDICINE

## 2023-10-20 PROCEDURE — 1123F ACP DISCUSS/DSCN MKR DOCD: CPT | Performed by: INTERNAL MEDICINE

## 2023-10-20 PROCEDURE — 4004F PT TOBACCO SCREEN RCVD TLK: CPT | Performed by: INTERNAL MEDICINE

## 2023-10-20 RX ORDER — METHYLPREDNISOLONE 4 MG/1
TABLET ORAL
Qty: 1 KIT | Refills: 0 | Status: SHIPPED | OUTPATIENT
Start: 2023-10-20 | End: 2023-10-26

## 2023-10-20 RX ORDER — TIZANIDINE 4 MG/1
4 TABLET ORAL 3 TIMES DAILY PRN
Qty: 30 TABLET | Refills: 0 | Status: SHIPPED | OUTPATIENT
Start: 2023-10-20

## 2023-10-20 RX ORDER — ROSUVASTATIN CALCIUM 40 MG/1
TABLET, COATED ORAL
COMMUNITY
Start: 2023-08-17

## 2023-10-20 ASSESSMENT — ENCOUNTER SYMPTOMS
EYES NEGATIVE: 1
BACK PAIN: 1
RESPIRATORY NEGATIVE: 1
ABDOMINAL PAIN: 0
ALLERGIC/IMMUNOLOGIC NEGATIVE: 1
GASTROINTESTINAL NEGATIVE: 1
SHORTNESS OF BREATH: 0

## 2023-10-23 ENCOUNTER — PATIENT MESSAGE (OUTPATIENT)
Age: 78
End: 2023-10-23

## 2023-10-23 DIAGNOSIS — M79.605 PAIN OF BACK AND LEFT LOWER EXTREMITY: ICD-10-CM

## 2023-10-23 DIAGNOSIS — M19.90 ARTHRITIS: Primary | ICD-10-CM

## 2023-10-23 DIAGNOSIS — M54.9 PAIN OF BACK AND LEFT LOWER EXTREMITY: ICD-10-CM

## 2023-10-23 NOTE — TELEPHONE ENCOUNTER
From: Bella Johnson  To: Dr. Camille Corona: 10/23/2023 12:01 PM EDT  Subject: Brendalyn Jo Ann results for my spine    Dr, as you know I am leaving for [place] on 11/6 for 10 days. Is there any other medicines I should be taking for the pain in my back and buttocks and legs. Would it be possible to do pt before I go to see what exercises I can do while traveling and then do pt when I get back or is pt not an option for loss of disc height? Thank you for your help.   Allyn Ware [phone]

## 2023-10-31 ENCOUNTER — HOSPITAL ENCOUNTER (OUTPATIENT)
Facility: HOSPITAL | Age: 78
Setting detail: RECURRING SERIES
Discharge: HOME | End: 2023-11-03
Attending: INTERNAL MEDICINE
Payer: MEDICARE

## 2023-10-31 PROCEDURE — 97162 PT EVAL MOD COMPLEX 30 MIN: CPT | Performed by: PHYSICAL THERAPIST

## 2023-10-31 PROCEDURE — 97110 THERAPEUTIC EXERCISES: CPT | Performed by: PHYSICAL THERAPIST

## 2023-10-31 PROCEDURE — 97140 MANUAL THERAPY 1/> REGIONS: CPT | Performed by: PHYSICAL THERAPIST

## 2023-10-31 NOTE — THERAPY EVALUATION
Physical Therapy at [facility],   a part of [address] [place] [address] Evelin Wilks S Maureen Rojo  WKSL:[phone] LVO:[phone]                                                                            PHYSICAL THERAPY - MEDICARE EVALUATION/PLAN OF CARE NOTE (updated 3/23)      Date: 10/31/2023          Patient Name:  Karina Benedict :  1945   Medical   Diagnosis:  Arthritis [M19.90]  Pain of back and left lower extremity [M54.9, M79.605] Treatment Diagnosis:  M54.59  OTHER LOWER BACK PAIN    Referral Source:  Rafa Mc DO Provider #:  [id number]                  Insurance: Payor: MEDICARE / Plan: MEDICARE PART A AND B / Product Type: *No Product type* /      Patient  verified yes     Visit #   Current  / Total 1 24   Time   In / Out 1140 am 1240 pm   Total Treatment Time 60   Total Timed Codes 25   1:1 Treatment Time 25      [facility] Totals Reminder:  bill using total billable   min of TIMED therapeutic procedures and modalities. 8-22 min = 1 unit; 23-37 min = 2 units; 38-52 min = 3 units;  53-67 min = 4 units; 68-82 min = 5 units           SUBJECTIVE  Pain Level (0-10 scale): 1/10 sitting, 7-8/10 at worst  []constant []intermittent []improving []worsening []no change since onset    Any medication changes, allergies to medications, adverse drug reactions, diagnosis change, or new procedure performed?: [x] No    [] Yes (see summary sheet for update)  Medications: Verified on Patient Summary List    Subjective functional status/changes:     Pt reports that on 23 he began to have some pain in back and buttocks. In May pt took a long road trip and had some buttocks pain, but it went away on its own. He went to ED due to LLE buckling when ambulation. He was placed on steroid pack and muscle relaxors. Following prednisone he did feel better, but then pain started to return. He is doing stretches that he found online.  Reaching out, golfing, and bending over

## 2023-11-03 ENCOUNTER — APPOINTMENT (OUTPATIENT)
Facility: HOSPITAL | Age: 78
End: 2023-11-03
Attending: INTERNAL MEDICINE
Payer: MEDICARE

## 2023-11-03 ENCOUNTER — TELEPHONE (OUTPATIENT)
Age: 78
End: 2023-11-03

## 2023-11-03 DIAGNOSIS — M54.9 PAIN OF BACK AND LEFT LOWER EXTREMITY: Primary | ICD-10-CM

## 2023-11-03 DIAGNOSIS — M79.18 BILATERAL BUTTOCK PAIN: ICD-10-CM

## 2023-11-03 DIAGNOSIS — M79.605 PAIN OF BACK AND LEFT LOWER EXTREMITY: Primary | ICD-10-CM

## 2023-11-03 RX ORDER — TRAMADOL HYDROCHLORIDE 50 MG/1
50 TABLET ORAL 2 TIMES DAILY PRN
Qty: 30 TABLET | Refills: 0 | Status: SHIPPED | OUTPATIENT
Start: 2023-11-03 | End: 2023-12-03

## 2023-11-03 NOTE — TELEPHONE ENCOUNTER
[facility]  Patient would like a script for tramadol sent to his pharmacy. He is traveling on Monday and would need it sent before then.   10/20/2023  01/17/2024

## 2023-11-20 ENCOUNTER — HOSPITAL ENCOUNTER (OUTPATIENT)
Facility: HOSPITAL | Age: 78
Setting detail: RECURRING SERIES
Discharge: HOME | End: 2023-11-23
Attending: INTERNAL MEDICINE
Payer: MEDICARE

## 2023-11-20 PROCEDURE — 97110 THERAPEUTIC EXERCISES: CPT | Performed by: PHYSICAL THERAPIST

## 2023-11-20 PROCEDURE — 97140 MANUAL THERAPY 1/> REGIONS: CPT | Performed by: PHYSICAL THERAPIST

## 2023-11-20 NOTE — PROGRESS NOTES
PHYSICAL THERAPY - MEDICARE DAILY TREATMENT NOTE (updated 3/23)      Date: 2023          Patient Name:  Reyna Upton :  1945   Medical   Diagnosis:  Arthritis [M19.90]  Pain of back and left lower extremity [M54.9, M79.605] Treatment Diagnosis:  M54.59  OTHER LOWER BACK PAIN    Referral Source:  Jeffrey Juarez DO Insurance:   Payor: Amanda Mcmanus / Plan: MEDICARE PART A AND B / Product Type: *No Product type* /                     Patient  verified yes     Visit #   Current  / Total 2 24   Time   In / Out 103 pm 146 pm   Total Treatment Time 43   Total Timed Codes 43   1:1 Treatment Time 45      [facility] Totals Reminder:  bill using total billable   min of TIMED therapeutic procedures and modalities. 8-22 min = 1 unit; 23-37 min = 2 units; 38-52 min = 3 units; 53-67 min = 4 units; 68-82 min = 5 units            SUBJECTIVE    Pain Level (0-10 scale): 1/10    Any medication changes, allergies to medications, adverse drug reactions, diagnosis change, or new procedure performed?: [x] No    [] Yes (see summary sheet for update)  Medications: Verified on Patient Summary List    Subjective functional status/changes:     Pt reports that he traveled to [place] and his back did pretty well with traveling and the walking. He followed recommendations and did perform exercises daily. OBJECTIVE      Therapeutic Procedures: Tx Min Billable or 1:1 Min (if diff from Tx Min) Procedure, Rationale, Specifics   8 [phone] [id number] Manual Therapy (timed):  decrease pain, increase ROM, increase tissue extensibility, and decrease trigger points to improve patient's ability to progress to PLOF and address remaining functional goals. The manual therapy interventions were performed at a separate and distinct time from the therapeutic activities interventions .  (see flow sheet as applicable)     Details if applicable:     [id number] [phone] [id number] Therapeutic Exercise (timed):  increase ROM, strength, coordination, balance, and proprioception to

## 2023-11-22 ENCOUNTER — HOSPITAL ENCOUNTER (OUTPATIENT)
Facility: HOSPITAL | Age: 78
Setting detail: RECURRING SERIES
Discharge: HOME | End: 2023-11-25
Attending: INTERNAL MEDICINE
Payer: MEDICARE

## 2023-11-22 PROCEDURE — 97140 MANUAL THERAPY 1/> REGIONS: CPT | Performed by: PHYSICAL THERAPIST

## 2023-11-22 PROCEDURE — 97110 THERAPEUTIC EXERCISES: CPT | Performed by: PHYSICAL THERAPIST

## 2023-11-22 NOTE — PROGRESS NOTES
PHYSICAL THERAPY - MEDICARE DAILY TREATMENT NOTE (updated 3/23)      Date: 2023          Patient Name:  Shai Rojas :  1945   Medical   Diagnosis:  Arthritis [M19.90]  Pain of back and left lower extremity [M54.9, M79.605] Treatment Diagnosis:  M54.59  OTHER LOWER BACK PAIN    Referral Source:  Laura Valentine DO Insurance:   Payor: Lizett Villa / Plan: MEDICARE PART A AND B / Product Type: *No Product type* /                     Patient  verified yes     Visit #   Current  / Total 3 24   Time   In / Out 752 am 838 am   Total Treatment Time 46   Total Timed Codes 46   1:1 Treatment Time 40      [facility] Totals Reminder:  bill using total billable   min of TIMED therapeutic procedures and modalities. 8-22 min = 1 unit; 23-37 min = 2 units; 38-52 min = 3 units; 53-67 min = 4 units; 68-82 min = 5 units            SUBJECTIVE    Pain Level (0-10 scale): 4/10    Any medication changes, allergies to medications, adverse drug reactions, diagnosis change, or new procedure performed?: [x] No    [] Yes (see summary sheet for update)  Medications: Verified on Patient Summary List    Subjective functional status/changes:     Pt reports that he had a very busy day yesterday and has more pain this morning. OBJECTIVE      Therapeutic Procedures: Tx Min Billable or 1:1 Min (if diff from Tx Min) Procedure, Rationale, Specifics   10 10 00290 Manual Therapy (timed):  decrease pain, increase ROM, increase tissue extensibility, and decrease trigger points to improve patient's ability to progress to PLOF and address remaining functional goals. The manual therapy interventions were performed at a separate and distinct time from the therapeutic activities interventions .  (see flow sheet as applicable)     Details if applicable:  STM L QL and L glutes and piriformis   36 [phone] [id number] Therapeutic Exercise (timed):  increase ROM, strength, coordination, balance, and proprioception to improve patient's ability to progress to

## 2023-11-27 ENCOUNTER — HOSPITAL ENCOUNTER (OUTPATIENT)
Facility: HOSPITAL | Age: 78
Setting detail: RECURRING SERIES
Discharge: HOME | End: 2023-11-30
Attending: INTERNAL MEDICINE
Payer: MEDICARE

## 2023-11-27 PROCEDURE — 97140 MANUAL THERAPY 1/> REGIONS: CPT | Performed by: PHYSICAL THERAPIST

## 2023-11-27 PROCEDURE — 97110 THERAPEUTIC EXERCISES: CPT | Performed by: PHYSICAL THERAPIST

## 2023-11-27 NOTE — PROGRESS NOTES
PHYSICAL THERAPY - MEDICARE DAILY TREATMENT NOTE (updated 3/23)      Date: 2023          Patient Name:  Con Saravia :  1945   Medical   Diagnosis:  Arthritis [M19.90]  Pain of back and left lower extremity [M54.9, M79.605] Treatment Diagnosis:  M54.59  OTHER LOWER BACK PAIN    Referral Source:  Kimmy Love DO Insurance:   Payor: MEDICARE / Plan: MEDICARE PART A AND B / Product Type: *No Product type* /                     Patient  verified yes     Visit #   Current  / Total 4 24   Time   In / Out 1251 pm 142 pm   Total Treatment Time 51   Total Timed Codes 51   1:1 Treatment Time 39      [facility] Totals Reminder:  bill using total billable   min of TIMED therapeutic procedures and modalities. 8-22 min = 1 unit; 23-37 min = 2 units; 38-52 min = 3 units; 53-67 min = 4 units; 68-82 min = 5 units            SUBJECTIVE    Pain Level (0-10 scale):2/10    Any medication changes, allergies to medications, adverse drug reactions, diagnosis change, or new procedure performed?: [x] No    [] Yes (see summary sheet for update)  Medications: Verified on Patient Summary List    Subjective functional status/changes:     Pt reports he traveled over [facility] and his back did pretty well, but after watching football yesterday and then standing up he had shooting pain in LLE and felt as if his leg were going to give out. Pain located L buttocks. OBJECTIVE      Therapeutic Procedures: Tx Min Billable or 1:1 Min (if diff from Tx Min) Procedure, Rationale, Specifics   10 10 99502 Manual Therapy (timed):  decrease pain, increase ROM, increase tissue extensibility, and decrease trigger points to improve patient's ability to progress to PLOF and address remaining functional goals. The manual therapy interventions were performed at a separate and distinct time from the therapeutic activities interventions .  (see flow sheet as applicable)     Details if applicable:  STM L gluteal attachments to sacrum   41

## 2023-11-29 ENCOUNTER — APPOINTMENT (OUTPATIENT)
Facility: HOSPITAL | Age: 78
End: 2023-11-29
Attending: INTERNAL MEDICINE
Payer: MEDICARE

## 2023-12-01 ENCOUNTER — HOSPITAL ENCOUNTER (OUTPATIENT)
Facility: HOSPITAL | Age: 78
Setting detail: RECURRING SERIES
Discharge: HOME | End: 2023-12-04
Attending: INTERNAL MEDICINE
Payer: MEDICARE

## 2023-12-01 PROCEDURE — 97110 THERAPEUTIC EXERCISES: CPT | Performed by: PHYSICAL THERAPIST

## 2023-12-01 PROCEDURE — 97140 MANUAL THERAPY 1/> REGIONS: CPT | Performed by: PHYSICAL THERAPIST

## 2023-12-01 NOTE — PROGRESS NOTES
PHYSICAL THERAPY - MEDICARE DAILY TREATMENT NOTE (updated 3/23)      Date: 2023          Patient Name:  Shai Rojas :  1945   Medical   Diagnosis:  Arthritis [M19.90]  Pain of back and left lower extremity [M54.9, M79.605] Treatment Diagnosis:  M54.59  OTHER LOWER BACK PAIN    Referral Source:  Laura Valentine DO Insurance:   Payor: [employer] [place] / Plan: MEDICARE PART A AND B / Product Type: *No Product type* /                     Patient  verified yes     Visit #   Current  / Total 5 24   Time   In / Out 932 am 1017 am   Total Treatment Time 45   Total Timed Codes 45   1:1 Treatment Time 23      [facility] Totals Reminder:  bill using total billable   min of TIMED therapeutic procedures and modalities. 8-22 min = 1 unit; 23-37 min = 2 units; 38-52 min = 3 units; 53-67 min = 4 units; 68-82 min = 5 units            SUBJECTIVE    Pain Level (0-10 scale):0/10    Any medication changes, allergies to medications, adverse drug reactions, diagnosis change, or new procedure performed?: [x] No    [] Yes (see summary sheet for update)  Medications: Verified on Patient Summary List    Subjective functional status/changes:     Pt reports his back has felt pretty good the past few days. OBJECTIVE      Therapeutic Procedures: Tx Min Billable or 1:1 Min (if diff from Tx Min) Procedure, Rationale, Specifics   [phone] [id number] Manual Therapy (timed):  decrease pain, increase ROM, increase tissue extensibility, and decrease trigger points to improve patient's ability to progress to PLOF and address remaining functional goals. The manual therapy interventions were performed at a separate and distinct time from the therapeutic activities interventions .  (see flow sheet as applicable)     Details if applicable:  STM L gluteal attachments to sacrum, L QL   35 [phone] [id number] Therapeutic Exercise (timed):  increase ROM, strength, coordination, balance, and proprioception to improve patient's ability to progress to PLOF and address

## 2023-12-04 ENCOUNTER — HOSPITAL ENCOUNTER (OUTPATIENT)
Facility: HOSPITAL | Age: 78
Setting detail: RECURRING SERIES
Discharge: HOME | End: 2023-12-07
Attending: INTERNAL MEDICINE
Payer: MEDICARE

## 2023-12-04 PROCEDURE — 97110 THERAPEUTIC EXERCISES: CPT | Performed by: PHYSICAL THERAPIST

## 2023-12-04 PROCEDURE — 97140 MANUAL THERAPY 1/> REGIONS: CPT | Performed by: PHYSICAL THERAPIST

## 2023-12-04 NOTE — PROGRESS NOTES
Physical Therapy at [facility],   a part of [address] [place] [address] E[address]in [address]ks [address] Maureen Rojo  Phone: [phone]  Fax: [phone]     PHYSICAL THERAPY PROGRESS NOTE  Patient Name:  Aida Alonzo :  1945   Treatment/Medical Diagnosis: Arthritis [M19.90]  Pain of back and left lower extremity [M54.9, M79.605]   Referral Source:  Easton Guthrie DO     Date of Initial Visit:  10/31/23 Attended Visits:  6 Missed Visits:  0     SUMMARY OF TREATMENT/ASSESSMENT:    Pt has completed 6 skilled PT services demonstrating improvements with lumbar AROM with only discomfort with L sidebending at this time. He continues to have some LLE weakness and stiffness with thoracic rotation limiting functional activities. He has improved TTP in L piriformis and gluteals with minimal TTP in QL. He has met 2/2 STG and 1/4 LTG at this time. Patient will continue to benefit from skilled PT / OT services to modify and progress therapeutic interventions, analyze and address functional mobility deficits, analyze and address ROM deficits, analyze and address strength deficits, analyze and address soft tissue restrictions, analyze and cue for proper movement patterns, analyze and modify for postural abnormalities, and analyze and address imbalance/dizziness to address functional deficits and attain remaining goals. CURRENT STATUS  Lumbar AROM:                                                                                               R                      L                        Flexion                                                 WNL                              Extension                                            25%                          Side Bending                           Finger to jt line bi.  \"Tight\" on L                                                 Rotation                                  WNL                WNL                        LOWER QUARTER

## 2023-12-04 NOTE — PROGRESS NOTES
PHYSICAL THERAPY - MEDICARE DAILY TREATMENT NOTE (updated 3/23)      Date: 2023          Patient Name:  Ita Kraus :  1945   Medical   Diagnosis:  Arthritis [M19.90]  Pain of back and left lower extremity [M54.9, M79.605] Treatment Diagnosis:  M54.59  OTHER LOWER BACK PAIN    Referral Source:  Valerie Dorsey DO Insurance:   Payor: [employer] / Plan: MEDICARE PART A AND B / Product Type: *No Product type* /                     Patient  verified yes     Visit #   Current  / Total 6 24   Time   In / Out 754 am 858 am   Total Treatment Time 64   Total Timed Codes 64   1:1 Treatment Time 59      [facility] Totals Reminder:  bill using total billable   min of TIMED therapeutic procedures and modalities. 8-22 min = 1 unit; 23-37 min = 2 units; 38-52 min = 3 units; 53-67 min = 4 units; 68-82 min = 5 units            SUBJECTIVE    Pain Level (0-10 scale):0/10    Any medication changes, allergies to medications, adverse drug reactions, diagnosis change, or new procedure performed?: [x] No    [] Yes (see summary sheet for update)  Medications: Verified on Patient Summary List    Subjective functional status/changes:     Pt reports his back has been feeling good with occasional twinges. Had some pain in his R hamstring today, but it passed quickly. He would like to work more on his trunk rotation to help with golf swing. OBJECTIVE      Therapeutic Procedures: Tx Min Billable or 1:1 Min (if diff from Tx Min) Procedure, Rationale, Specifics   8  25326 Manual Therapy (timed):  decrease pain, increase ROM, increase tissue extensibility, and decrease trigger points to improve patient's ability to progress to PLOF and address remaining functional goals. The manual therapy interventions were performed at a separate and distinct time from the therapeutic activities interventions .  (see flow sheet as applicable)     Details if applicable:  [facility] L QL   56  95621 Therapeutic Exercise (timed):  increase ROM,

## 2023-12-06 ENCOUNTER — HOSPITAL ENCOUNTER (OUTPATIENT)
Facility: HOSPITAL | Age: 78
Setting detail: RECURRING SERIES
Discharge: HOME | End: 2023-12-09
Attending: INTERNAL MEDICINE
Payer: MEDICARE

## 2023-12-06 PROCEDURE — 97140 MANUAL THERAPY 1/> REGIONS: CPT | Performed by: PHYSICAL THERAPIST

## 2023-12-06 PROCEDURE — 97110 THERAPEUTIC EXERCISES: CPT | Performed by: PHYSICAL THERAPIST

## 2023-12-06 NOTE — PROGRESS NOTES
PHYSICAL THERAPY - MEDICARE DAILY TREATMENT NOTE (updated 3/23)      Date: 2023          Patient Name:  Con Saravia :  1945   Medical   Diagnosis:  Arthritis [M19.90]  Pain of back and left lower extremity [M54.9, M79.605] Treatment Diagnosis:  M54.59  OTHER LOWER BACK PAIN    Referral Source:  Kimmy Love DO Insurance:   Payor: [place] / Plan: MEDICARE PART A AND B / Product Type: *No Product type* /                     Patient  verified yes     Visit #   Current  / Total 7 24   Time   In / Out 755 am 852 am   Total Treatment Time 57   Total Timed Codes 57   1:1 Treatment Time 62      [facility] Totals Reminder:  bill using total billable   min of TIMED therapeutic procedures and modalities. 8-22 min = 1 unit; 23-37 min = 2 units; 38-52 min = 3 units; 53-67 min = 4 units; 68-82 min = 5 units            SUBJECTIVE    Pain Level (0-10 scale):0/10    Any medication changes, allergies to medications, adverse drug reactions, diagnosis change, or new procedure performed?: [x] No    [] Yes (see summary sheet for update)  Medications: Verified on Patient Summary List    Subjective functional status/changes:     Pt reports he has been feeling pretty good. Had some muscle soreness following previous visit. OBJECTIVE      Therapeutic Procedures: Tx Min Billable or 1:1 Min (if diff from Tx Min) Procedure, Rationale, Specifics   8  53616 Manual Therapy (timed):  decrease pain, increase ROM, increase tissue extensibility, and decrease trigger points to improve patient's ability to progress to PLOF and address remaining functional goals. The manual therapy interventions were performed at a separate and distinct time from the therapeutic activities interventions .  (see flow sheet as applicable)     Details if applicable:  STM L QL and piriformis   49  33658 Therapeutic Exercise (timed):  increase ROM, strength, coordination, balance, and proprioception to improve patient's ability to progress to PLOF

## 2023-12-11 ENCOUNTER — APPOINTMENT (OUTPATIENT)
Facility: HOSPITAL | Age: 78
End: 2023-12-11
Attending: INTERNAL MEDICINE
Payer: MEDICARE

## 2023-12-12 ENCOUNTER — HOSPITAL ENCOUNTER (OUTPATIENT)
Facility: HOSPITAL | Age: 78
Setting detail: RECURRING SERIES
End: 2023-12-12
Attending: INTERNAL MEDICINE
Payer: MEDICARE

## 2023-12-13 ENCOUNTER — APPOINTMENT (OUTPATIENT)
Facility: HOSPITAL | Age: 78
End: 2023-12-13
Attending: INTERNAL MEDICINE
Payer: MEDICARE

## 2023-12-27 ENCOUNTER — HOSPITAL ENCOUNTER (OUTPATIENT)
Facility: HOSPITAL | Age: 78
Setting detail: RECURRING SERIES
Discharge: HOME | End: 2023-12-30
Attending: INTERNAL MEDICINE
Payer: MEDICARE

## 2023-12-27 PROCEDURE — 97110 THERAPEUTIC EXERCISES: CPT | Performed by: PHYSICAL THERAPIST

## 2023-12-27 NOTE — PROGRESS NOTES
PHYSICAL THERAPY - MEDICARE DAILY TREATMENT NOTE (updated 3/23)      Date: 2023          Patient Name:  Geoff Knapp :  1945   Medical   Diagnosis:  Arthritis [M19.90]  Pain of back and left lower extremity [M54.9, M79.605] Treatment Diagnosis:  M54.59  OTHER LOWER BACK PAIN    Referral Source:  Burgess Ariane DO Insurance:   Payor: [facility] / Plan: MEDICARE PART A AND B / Product Type: *No Product type* /                     Patient  verified yes     Visit #   Current  / Total 11 24   Time   In / Out 755 am 850 am   Total Treatment Time 55   Total Timed Codes 55   1:1 Treatment Time 40      [facility] Totals Reminder:  bill using total billable   min of TIMED therapeutic procedures and modalities. 8-22 min = 1 unit; 23-37 min = 2 units; 38-52 min = 3 units; 53-67 min = 4 units; 68-82 min = 5 units            SUBJECTIVE    Pain Level (0-10 scale): 1/10    Any medication changes, allergies to medications, adverse drug reactions, diagnosis change, or new procedure performed?: [x] No    [] Yes (see summary sheet for update)  Medications: Verified on Patient Summary List    Subjective functional status/changes:     Pt reports his back has not hurt at all, but gets achiness in his L buttocks first thing in the morning which improves with activity. OBJECTIVE      Therapeutic Procedures: Tx Min Billable or 1:1 Min (if diff from Tx Min) Procedure, Rationale, Specifics   0  24294 Manual Therapy (timed):  decrease pain, increase ROM, increase tissue extensibility, and decrease trigger points to improve patient's ability to progress to PLOF and address remaining functional goals. The manual therapy interventions were performed at a separate and distinct time from the therapeutic activities interventions .  (see flow sheet as applicable)     Details if applicable:  STM L QL and piriformis   55 40 50047 Therapeutic Exercise (timed):  increase ROM, strength, coordination, balance, and proprioception to

## 2024-01-01 DIAGNOSIS — I10 ESSENTIAL HYPERTENSION: Primary | ICD-10-CM

## 2024-01-02 ENCOUNTER — HOSPITAL ENCOUNTER (OUTPATIENT)
Facility: HOSPITAL | Age: 79
Setting detail: RECURRING SERIES
Discharge: HOME | End: 2024-01-05
Attending: INTERNAL MEDICINE
Payer: MEDICARE

## 2024-01-02 PROCEDURE — 97110 THERAPEUTIC EXERCISES: CPT | Performed by: PHYSICAL THERAPIST

## 2024-01-02 RX ORDER — LOSARTAN POTASSIUM 50 MG/1
TABLET ORAL
Qty: 90 TABLET | Refills: 1 | Status: SHIPPED | OUTPATIENT
Start: 2024-01-02

## 2024-01-02 NOTE — PROGRESS NOTES
ability to progress to PLOF and address remaining functional goals. (see flow sheet as applicable)     Details if applicable:  see flow sheet   50     Total Total       [x]  Patient Education billed concurrently with other procedures   [x] Review HEP    [] Progressed/Changed HEP, detail:    [] Other detail:         Other Objective/Functional Measures    NT    Pain Level at end of session (0-10 scale): 0/10      Assessment   Pt participated well progressing with strengthening exercises. No pain at end of treatment and pt educated on which stretches to perform when pain is present.     Patient will continue to benefit from skilled PT / OT services to modify and progress therapeutic interventions, analyze and address functional mobility deficits, analyze and address ROM deficits, analyze and address strength deficits, analyze and address soft tissue restrictions, analyze and cue for proper movement patterns, analyze and modify for postural abnormalities, and analyze and address imbalance/dizziness to address functional deficits and attain remaining goals.    Progress toward goals / Updated goals:  []  See Progress Note/Recertification    Short Term Goals: To be accomplished in 4-6 treatments.  Pt will be ind with HEP. Met  Pt will be able to reduce pain to no greater than 3/10 with flare ups. Met  Long Term Goals: To be accomplished in 24 treatments.  Pt will be able to walk for 1 mile without increased symptoms. Not Met  Pt will be able to play golf with pain no greater than 2/10. Not Met  Pt will improve FOTO by 10 points for improved functional ability. Met  Pt will be able to navigate flight of stairs with step over step gait pattern without increased symptoms. Met         PLAN  Yes  Continue plan of care  Re-Cert Due: 1/30/24  [x]  Upgrade activities as tolerated  []  Discharge due to :  []  Other:      Letitia Stewart, PT       1/2/2024       10:00 AM

## 2024-01-04 ENCOUNTER — HOSPITAL ENCOUNTER (OUTPATIENT)
Facility: HOSPITAL | Age: 79
Setting detail: RECURRING SERIES
Discharge: HOME | End: 2024-01-07
Attending: INTERNAL MEDICINE
Payer: MEDICARE

## 2024-01-04 PROCEDURE — 97110 THERAPEUTIC EXERCISES: CPT | Performed by: PHYSICAL THERAPIST

## 2024-01-04 NOTE — PROGRESS NOTES
PHYSICAL THERAPY - MEDICARE DAILY TREATMENT NOTE (updated 3/23)      Date: 2024          Patient Name:  nAgel Tsai :  1945   Medical   Diagnosis:  Arthritis [M19.90]  Pain of back and left lower extremity [M54.9, M79.605] Treatment Diagnosis:  M54.59  OTHER LOWER BACK PAIN    Referral Source:  Evan Angel DO Insurance:   Payor: MEDICARE / Plan: MEDICARE PART A AND B / Product Type: *No Product type* /                     Patient  verified yes     Visit #   Current  / Total 13 24   Time   In / Out 956 am 1048 am   Total Treatment Time 52   Total Timed Codes 52   1:1 Treatment Time 52      [facility] Totals Reminder:  bill using total billable   min of TIMED therapeutic procedures and modalities.   8-22 min = 1 unit; 23-37 min = 2 units; 38-52 min = 3 units; 53-67 min = 4 units; 68-82 min = 5 units            SUBJECTIVE    Pain Level (0-10 scale): ache/10    Any medication changes, allergies to medications, adverse drug reactions, diagnosis change, or new procedure performed?: [x] No    [] Yes (see summary sheet for update)  Medications: Verified on Patient Summary List    Subjective functional status/changes:     Notes some stiffness in low back this morning, but did a few exercises which helped. Continues to have ache in L buttocks which he doesn't notice when active, but feels it when sitting for long durations. Pt reports overall 90% improvement.     OBJECTIVE      Therapeutic Procedures:  Tx Min Billable or 1:1 Min (if diff from Tx Min) Procedure, Rationale, Specifics   0  64442 Manual Therapy (timed):  decrease pain, increase ROM, increase tissue extensibility, and decrease trigger points to improve patient's ability to progress to PLOF and address remaining functional goals.  The manual therapy interventions were performed at a separate and distinct time from the therapeutic activities interventions . (see flow sheet as applicable)     Details if applicable:  STM L QL and piriformis   52

## 2024-01-04 NOTE — PROGRESS NOTES
Physical Therapy at [facility],   a part of [facility]  [address] [address]  Phone: [phone]  Fax: [phone]     PHYSICAL THERAPY PROGRESS NOTE  Patient Name:  Angel Tsai :  1945   Treatment/Medical Diagnosis: Arthritis [M19.90]  Pain of back and left lower extremity [M54.9, M79.605]   Referral Source:  Evan Angel DO     Date of Initial Visit:  10/31/23 Attended Visits:  13 Missed Visits:  0     SUMMARY OF TREATMENT/ASSESSMENT:  Pt has completed 13 skilled PT sessions focusing on improving lumbar AROM, hip flexibility, and core and hip strength. He has made good progress towards goals, but has continued weakness in L hip. Overall pain levels are reduced, but continues to have ache in L buttocks when at rest for long periods of sitting. He has met 2/2 STG and 2/4 LTG goals and is expected to continue making gains towards goals with continued skilled PT services.     CURRENT STATUS    Short Term Goals: To be accomplished in 4-6 treatments.  Pt will be ind with HEP. Met  Pt will be able to reduce pain to no greater than 3/10 with flare ups. Met  Long Term Goals: To be accomplished in 24 treatments.  Pt will be able to walk for 1 mile without increased symptoms. Not Met  Pt will be able to play golf with pain no greater than 2/10. Not Met  Pt will improve FOTO by 10 points for improved functional ability. Met  Pt will be able to navigate flight of stairs with step over step gait pattern without increased symptoms. Met      RECOMMENDATIONS  Continue Per KRISTOPHER Stewart, PT       2024       10:14 AM    If you have any questions/comments please contact us directly at [phone].   Thank you for allowing us to assist in the care of your patient.

## 2024-01-09 ENCOUNTER — HOSPITAL ENCOUNTER (OUTPATIENT)
Facility: HOSPITAL | Age: 79
Setting detail: RECURRING SERIES
Discharge: HOME | End: 2024-01-12
Attending: INTERNAL MEDICINE
Payer: MEDICARE

## 2024-01-09 PROCEDURE — 97110 THERAPEUTIC EXERCISES: CPT | Performed by: PHYSICAL THERAPIST

## 2024-01-09 NOTE — PROGRESS NOTES
PHYSICAL THERAPY - MEDICARE DAILY TREATMENT NOTE (updated 3/23)      Date: 2024          Patient Name:  Angel Tsai :  1945   Medical   Diagnosis:  Arthritis [M19.90]  Pain of back and left lower extremity [M54.9, M79.605] Treatment Diagnosis:  M54.59  OTHER LOWER BACK PAIN    Referral Source:  Evan Angel DO Insurance:   Payor: MEDICARE / Plan: MEDICARE PART A AND B / Product Type: *No Product type* /                     Patient  verified yes     Visit #   Current  / Total 14 24   Time   In / Out 955 am 1048 am   Total Treatment Time 53   Total Timed Codes 53   1:1 Treatment Time 53      [facility] Totals Reminder:  bill using total billable   min of TIMED therapeutic procedures and modalities.   8-22 min = 1 unit; 23-37 min = 2 units; 38-52 min = 3 units; 53-67 min = 4 units; 68-82 min = 5 units            SUBJECTIVE    Pain Level (0-10 scale): ache/10    Any medication changes, allergies to medications, adverse drug reactions, diagnosis change, or new procedure performed?: [x] No    [] Yes (see summary sheet for update)  Medications: Verified on Patient Summary List    Subjective functional status/changes:     Pt reports no achiness in his buttocks today, but his L hamstring feels tight.     OBJECTIVE      Therapeutic Procedures:  Tx Min Billable or 1:1 Min (if diff from Tx Min) Procedure, Rationale, Specifics   0  36925 Manual Therapy (timed):  decrease pain, increase ROM, increase tissue extensibility, and decrease trigger points to improve patient's ability to progress to PLOF and address remaining functional goals.  The manual therapy interventions were performed at a separate and distinct time from the therapeutic activities interventions . (see flow sheet as applicable)     Details if applicable:  STM L QL and piriformis   53  86133 Therapeutic Exercise (timed):  increase ROM, strength, coordination, balance, and proprioception to improve patient's ability to progress to PLOF and address

## 2024-01-11 ENCOUNTER — HOSPITAL ENCOUNTER (OUTPATIENT)
Facility: HOSPITAL | Age: 79
Setting detail: RECURRING SERIES
Discharge: HOME | End: 2024-01-14
Attending: INTERNAL MEDICINE
Payer: MEDICARE

## 2024-01-11 PROCEDURE — 97110 THERAPEUTIC EXERCISES: CPT | Performed by: PHYSICAL THERAPIST

## 2024-01-11 NOTE — PROGRESS NOTES
PHYSICAL THERAPY - MEDICARE DAILY TREATMENT NOTE (updated 3/23)      Date: 2024          Patient Name:  Angel Tsai :  1945   Medical   Diagnosis:  Arthritis [M19.90]  Pain of back and left lower extremity [M54.9, M79.605] Treatment Diagnosis:  M54.59  OTHER LOWER BACK PAIN    Referral Source:  Evan Angel DO Insurance:   Payor: MEDICARE / Plan: MEDICARE PART A AND B / Product Type: *No Product type* /                     Patient  verified yes     Visit #   Current  / Total 15 24   Time   In / Out 950 am 1045 am   Total Treatment Time 55   Total Timed Codes 55   1:1 Treatment Time 42      [facility] Totals Reminder:  bill using total billable   min of TIMED therapeutic procedures and modalities.   8-22 min = 1 unit; 23-37 min = 2 units; 38-52 min = 3 units; 53-67 min = 4 units; 68-82 min = 5 units            SUBJECTIVE    Pain Level (0-10 scale): 1/10    Any medication changes, allergies to medications, adverse drug reactions, diagnosis change, or new procedure performed?: [x] No    [] Yes (see summary sheet for update)  Medications: Verified on Patient Summary List    Subjective functional status/changes:     Pt reports that he had to sit in a lobby taking his friend to get an MRI yesterday and had a lot of pain sitting in that chair. After walking and performing some stretches that evening his pain reduced, but continued to have nagging discomfort for the evening. Better this morning.     OBJECTIVE      Therapeutic Procedures:  Tx Min Billable or 1:1 Min (if diff from Tx Min) Procedure, Rationale, Specifics   0  81450 Manual Therapy (timed):  decrease pain, increase ROM, increase tissue extensibility, and decrease trigger points to improve patient's ability to progress to PLOF and address remaining functional goals.  The manual therapy interventions were performed at a separate and distinct time from the therapeutic activities interventions . (see flow sheet as applicable)     Details if

## 2024-01-16 ENCOUNTER — HOSPITAL ENCOUNTER (OUTPATIENT)
Facility: HOSPITAL | Age: 79
Setting detail: RECURRING SERIES
Discharge: HOME | End: 2024-01-19
Attending: INTERNAL MEDICINE
Payer: MEDICARE

## 2024-01-16 PROCEDURE — 97110 THERAPEUTIC EXERCISES: CPT | Performed by: PHYSICAL THERAPIST

## 2024-01-16 NOTE — PROGRESS NOTES
PHYSICAL THERAPY - MEDICARE DAILY TREATMENT NOTE (updated 3/23)      Date: 2024          Patient Name:  Angel Tsai :  1945   Medical   Diagnosis:  Arthritis [M19.90]  Pain of back and left lower extremity [M54.9, M79.605] Treatment Diagnosis:  M54.59  OTHER LOWER BACK PAIN    Referral Source:  Evan Angel DO Insurance:   Payor: MEDICARE / Plan: MEDICARE PART A AND B / Product Type: *No Product type* /                     Patient  verified yes     Visit #   Current  / Total 16 24   Time   In / Out 1213 pm 108 pm   Total Treatment Time 55   Total Timed Codes 55   1:1 Treatment Time 55      [facility] Totals Reminder:  bill using total billable   min of TIMED therapeutic procedures and modalities.   8-22 min = 1 unit; 23-37 min = 2 units; 38-52 min = 3 units; 53-67 min = 4 units; 68-82 min = 5 units            SUBJECTIVE    Pain Level (0-10 scale): 0/10    Any medication changes, allergies to medications, adverse drug reactions, diagnosis change, or new procedure performed?: [x] No    [] Yes (see summary sheet for update)  Medications: Verified on Patient Summary List    Subjective functional status/changes:     Pt reports that he has been doing well.      OBJECTIVE      Therapeutic Procedures:  Tx Min Billable or 1:1 Min (if diff from Tx Min) Procedure, Rationale, Specifics   0  80256 Manual Therapy (timed):  decrease pain, increase ROM, increase tissue extensibility, and decrease trigger points to improve patient's ability to progress to PLOF and address remaining functional goals.  The manual therapy interventions were performed at a separate and distinct time from the therapeutic activities interventions . (see flow sheet as applicable)     Details if applicable:  STM L QL and piriformis   55  14119 Therapeutic Exercise (timed):  increase ROM, strength, coordination, balance, and proprioception to improve patient's ability to progress to PLOF and address remaining functional goals. (see flow

## 2024-01-17 ENCOUNTER — OFFICE VISIT (OUTPATIENT)
Age: 79
End: 2024-01-17
Payer: MEDICARE

## 2024-01-17 VITALS
DIASTOLIC BLOOD PRESSURE: 72 MMHG | SYSTOLIC BLOOD PRESSURE: 124 MMHG | OXYGEN SATURATION: 99 % | BODY MASS INDEX: 26.36 KG/M2 | RESPIRATION RATE: 16 BRPM | HEIGHT: 69 IN | WEIGHT: 178 LBS | TEMPERATURE: 98 F | HEART RATE: 72 BPM

## 2024-01-17 DIAGNOSIS — N40.1 BENIGN PROSTATIC HYPERPLASIA (BPH) WITH STRAINING ON URINATION: ICD-10-CM

## 2024-01-17 DIAGNOSIS — I87.2 VENOUS INSUFFICIENCY OF BOTH LOWER EXTREMITIES: ICD-10-CM

## 2024-01-17 DIAGNOSIS — I10 ESSENTIAL HYPERTENSION: Primary | ICD-10-CM

## 2024-01-17 DIAGNOSIS — R39.16 BENIGN PROSTATIC HYPERPLASIA (BPH) WITH STRAINING ON URINATION: ICD-10-CM

## 2024-01-17 DIAGNOSIS — I10 ESSENTIAL HYPERTENSION: ICD-10-CM

## 2024-01-17 DIAGNOSIS — R73.03 PREDIABETES: ICD-10-CM

## 2024-01-17 DIAGNOSIS — E78.00 PURE HYPERCHOLESTEROLEMIA: ICD-10-CM

## 2024-01-17 DIAGNOSIS — Z00.00 MEDICARE ANNUAL WELLNESS VISIT, SUBSEQUENT: ICD-10-CM

## 2024-01-17 PROCEDURE — 99214 OFFICE O/P EST MOD 30 MIN: CPT | Performed by: INTERNAL MEDICINE

## 2024-01-17 PROCEDURE — 1123F ACP DISCUSS/DSCN MKR DOCD: CPT | Performed by: INTERNAL MEDICINE

## 2024-01-17 PROCEDURE — G8419 CALC BMI OUT NRM PARAM NOF/U: HCPCS | Performed by: INTERNAL MEDICINE

## 2024-01-17 PROCEDURE — 3074F SYST BP LT 130 MM HG: CPT | Performed by: INTERNAL MEDICINE

## 2024-01-17 PROCEDURE — 4004F PT TOBACCO SCREEN RCVD TLK: CPT | Performed by: INTERNAL MEDICINE

## 2024-01-17 PROCEDURE — G8427 DOCREV CUR MEDS BY ELIG CLIN: HCPCS | Performed by: INTERNAL MEDICINE

## 2024-01-17 PROCEDURE — 3078F DIAST BP <80 MM HG: CPT | Performed by: INTERNAL MEDICINE

## 2024-01-17 PROCEDURE — G8484 FLU IMMUNIZE NO ADMIN: HCPCS | Performed by: INTERNAL MEDICINE

## 2024-01-17 PROCEDURE — G0439 PPPS, SUBSEQ VISIT: HCPCS | Performed by: INTERNAL MEDICINE

## 2024-01-17 SDOH — ECONOMIC STABILITY: FOOD INSECURITY: WITHIN THE PAST 12 MONTHS, THE FOOD YOU BOUGHT JUST DIDN'T LAST AND YOU DIDN'T HAVE MONEY TO GET MORE.: NEVER TRUE

## 2024-01-17 SDOH — ECONOMIC STABILITY: FOOD INSECURITY: WITHIN THE PAST 12 MONTHS, YOU WORRIED THAT YOUR FOOD WOULD RUN OUT BEFORE YOU GOT MONEY TO BUY MORE.: NEVER TRUE

## 2024-01-17 ASSESSMENT — ANXIETY QUESTIONNAIRES
6. BECOMING EASILY ANNOYED OR IRRITABLE: 0
GAD7 TOTAL SCORE: 0
5. BEING SO RESTLESS THAT IT IS HARD TO SIT STILL: 0
2. NOT BEING ABLE TO STOP OR CONTROL WORRYING: 0
1. FEELING NERVOUS, ANXIOUS, OR ON EDGE: 0
3. WORRYING TOO MUCH ABOUT DIFFERENT THINGS: 0
4. TROUBLE RELAXING: 0
7. FEELING AFRAID AS IF SOMETHING AWFUL MIGHT HAPPEN: 0
IF YOU CHECKED OFF ANY PROBLEMS ON THIS QUESTIONNAIRE, HOW DIFFICULT HAVE THESE PROBLEMS MADE IT FOR YOU TO DO YOUR WORK, TAKE CARE OF THINGS AT HOME, OR GET ALONG WITH OTHER PEOPLE: NOT DIFFICULT AT ALL

## 2024-01-17 ASSESSMENT — PATIENT HEALTH QUESTIONNAIRE - PHQ9
SUM OF ALL RESPONSES TO PHQ QUESTIONS 1-9: 0
SUM OF ALL RESPONSES TO PHQ QUESTIONS 1-9: 0
SUM OF ALL RESPONSES TO PHQ9 QUESTIONS 1 & 2: 0
2. FEELING DOWN, DEPRESSED OR HOPELESS: 0
SUM OF ALL RESPONSES TO PHQ QUESTIONS 1-9: 0
1. LITTLE INTEREST OR PLEASURE IN DOING THINGS: 0
SUM OF ALL RESPONSES TO PHQ QUESTIONS 1-9: 0

## 2024-01-18 ENCOUNTER — HOSPITAL ENCOUNTER (OUTPATIENT)
Facility: HOSPITAL | Age: 79
Setting detail: RECURRING SERIES
Discharge: HOME | End: 2024-01-21
Attending: INTERNAL MEDICINE
Payer: MEDICARE

## 2024-01-18 PROCEDURE — 97110 THERAPEUTIC EXERCISES: CPT | Performed by: PHYSICAL THERAPIST

## 2024-01-18 NOTE — PROGRESS NOTES
PHYSICAL THERAPY - MEDICARE DAILY TREATMENT NOTE (updated 3/23)      Date: 2024          Patient Name:  Angel Tsai :  1945   Medical   Diagnosis:  Arthritis [M19.90]  Pain of back and left lower extremity [M54.9, M79.605] Treatment Diagnosis:  M54.59  OTHER LOWER BACK PAIN    Referral Source:  Evan Angel DO Insurance:   Payor: MEDICARE / Plan: MEDICARE PART A AND B / Product Type: *No Product type* /                     Patient  verified yes     Visit #   Current  / Total 17 24   Time   In / Out 400 pm 451 pm   Total Treatment Time 51   Total Timed Codes 51   1:1 Treatment Time 40      [facility] Totals Reminder:  bill using total billable   min of TIMED therapeutic procedures and modalities.   8-22 min = 1 unit; 23-37 min = 2 units; 38-52 min = 3 units; 53-67 min = 4 units; 68-82 min = 5 units            SUBJECTIVE    Pain Level (0-10 scale): 0/10    Any medication changes, allergies to medications, adverse drug reactions, diagnosis change, or new procedure performed?: [x] No    [] Yes (see summary sheet for update)  Medications: Verified on Patient Summary List    Subjective functional status/changes:     Pt reports that he has been doing well.      OBJECTIVE      Therapeutic Procedures:  Tx Min Billable or 1:1 Min (if diff from Tx Min) Procedure, Rationale, Specifics   0  50763 Manual Therapy (timed):  decrease pain, increase ROM, increase tissue extensibility, and decrease trigger points to improve patient's ability to progress to PLOF and address remaining functional goals.  The manual therapy interventions were performed at a separate and distinct time from the therapeutic activities interventions . (see flow sheet as applicable)     Details if applicable:  STM L QL and piriformis   51 [phone] 25835 Therapeutic Exercise (timed):  increase ROM, strength, coordination, balance, and proprioception to improve patient's ability to progress to PLOF and address remaining functional goals. (see flow

## 2024-01-23 ENCOUNTER — HOSPITAL ENCOUNTER (OUTPATIENT)
Facility: HOSPITAL | Age: 79
Setting detail: RECURRING SERIES
Discharge: HOME | End: 2024-01-26
Attending: INTERNAL MEDICINE
Payer: MEDICARE

## 2024-01-23 PROCEDURE — 97110 THERAPEUTIC EXERCISES: CPT | Performed by: PHYSICAL THERAPIST

## 2024-01-23 NOTE — PROGRESS NOTES
PHYSICAL THERAPY - MEDICARE DAILY TREATMENT NOTE (updated 3/23)      Date: 2024          Patient Name:  Angel Tsai :  1945   Medical   Diagnosis:  Arthritis [M19.90]  Pain of back and left lower extremity [M54.9, M79.605] Treatment Diagnosis:  M54.59  OTHER LOWER BACK PAIN    Referral Source:  Evan Angel DO Insurance:   Payor: MEDICARE / Plan: MEDICARE PART A AND B / Product Type: *No Product type* /                     Patient  verified yes     Visit #   Current  / Total 18 24   Time   In / Out 1228 pm 122 pm   Total Treatment Time 54   Total Timed Codes 54   1:1 Treatment Time 54      [facility] Totals Reminder:  bill using total billable   min of TIMED therapeutic procedures and modalities.   8-22 min = 1 unit; 23-37 min = 2 units; 38-52 min = 3 units; 53-67 min = 4 units; 68-82 min = 5 units            SUBJECTIVE    Pain Level (0-10 scale): 0/10    Any medication changes, allergies to medications, adverse drug reactions, diagnosis change, or new procedure performed?: [x] No    [] Yes (see summary sheet for update)  Medications: Verified on Patient Summary List    Subjective functional status/changes:     Pt reports he had some shooting pain into his leg when he bent over quickly and coughed this morning, but went away quickly.     OBJECTIVE      Therapeutic Procedures:  Tx Min Billable or 1:1 Min (if diff from Tx Min) Procedure, Rationale, Specifics   0  01751 Manual Therapy (timed):  decrease pain, increase ROM, increase tissue extensibility, and decrease trigger points to improve patient's ability to progress to PLOF and address remaining functional goals.  The manual therapy interventions were performed at a separate and distinct time from the therapeutic activities interventions . (see flow sheet as applicable)     Details if applicable:  STM L QL and piriformis   54  86319 Therapeutic Exercise (timed):  increase ROM, strength, coordination, balance, and proprioception to improve

## 2024-01-25 ENCOUNTER — HOSPITAL ENCOUNTER (OUTPATIENT)
Facility: HOSPITAL | Age: 79
Setting detail: RECURRING SERIES
Discharge: HOME | End: 2024-01-28
Attending: INTERNAL MEDICINE
Payer: MEDICARE

## 2024-01-25 PROCEDURE — 97110 THERAPEUTIC EXERCISES: CPT | Performed by: PHYSICAL THERAPIST

## 2024-01-25 NOTE — PROGRESS NOTES
PHYSICAL THERAPY - MEDICARE DAILY TREATMENT NOTE (updated 3/23)      Date: 2024          Patient Name:  Angel Tsai :  1945   Medical   Diagnosis:  Arthritis [M19.90]  Pain of back and left lower extremity [M54.9, M79.605] Treatment Diagnosis:  M54.59  OTHER LOWER BACK PAIN    Referral Source:  Evan Angel DO Insurance:   Payor: MEDICARE / Plan: MEDICARE PART A AND B / Product Type: *No Product type* /                     Patient  verified yes     Visit #   Current  / Total 19 24   Time   In / Out 1053 pm 1143 pm   Total Treatment Time 50   Total Timed Codes 50   1:1 Treatment Time 40      [facility] Totals Reminder:  bill using total billable   min of TIMED therapeutic procedures and modalities.   8-22 min = 1 unit; 23-37 min = 2 units; 38-52 min = 3 units; 53-67 min = 4 units; 68-82 min = 5 units            SUBJECTIVE    Pain Level (0-10 scale): 1/10    Any medication changes, allergies to medications, adverse drug reactions, diagnosis change, or new procedure performed?: [x] No    [] Yes (see summary sheet for update)  Medications: Verified on Patient Summary List    Subjective functional status/changes:     Pt reports he woke with buttocks pain this morning which was pretty severe, but is more of an ache now.     OBJECTIVE      Therapeutic Procedures:  Tx Min Billable or 1:1 Min (if diff from Tx Min) Procedure, Rationale, Specifics   0  88212 Manual Therapy (timed):  decrease pain, increase ROM, increase tissue extensibility, and decrease trigger points to improve patient's ability to progress to PLOF and address remaining functional goals.  The manual therapy interventions were performed at a separate and distinct time from the therapeutic activities interventions . (see flow sheet as applicable)     Details if applicable:  STM L QL and piriformis   50 40 90896 Therapeutic Exercise (timed):  increase ROM, strength, coordination, balance, and proprioception to improve patient's ability to

## 2024-01-30 ENCOUNTER — HOSPITAL ENCOUNTER (OUTPATIENT)
Facility: HOSPITAL | Age: 79
Setting detail: RECURRING SERIES
Discharge: HOME | End: 2024-02-02
Attending: INTERNAL MEDICINE
Payer: MEDICARE

## 2024-01-30 PROCEDURE — 97110 THERAPEUTIC EXERCISES: CPT | Performed by: PHYSICAL THERAPIST

## 2024-01-30 ASSESSMENT — ENCOUNTER SYMPTOMS
GASTROINTESTINAL NEGATIVE: 1
RESPIRATORY NEGATIVE: 1
ALLERGIC/IMMUNOLOGIC NEGATIVE: 1
ABDOMINAL PAIN: 0
BACK PAIN: 1
EYES NEGATIVE: 1
SHORTNESS OF BREATH: 0

## 2024-01-30 NOTE — PROGRESS NOTES
PHYSICAL THERAPY - MEDICARE DAILY TREATMENT NOTE (updated 3/23)      Date: 2024          Patient Name:  Angel Tsai :  1945   Medical   Diagnosis:  Arthritis [M19.90]  Pain of back and left lower extremity [M54.9, M79.605] Treatment Diagnosis:  M54.59  OTHER LOWER BACK PAIN    Referral Source:  Evan Angel DO Insurance:   Payor: MEDICARE / Plan: MEDICARE PART A AND B / Product Type: *No Product type* /                     Patient  verified yes     Visit #   Current  / Total 20 24   Time   In / Out 1259 pm 154 pm   Total Treatment Time 55   Total Timed Codes 55   1:1 Treatment Time 40      [facility] Totals Reminder:  bill using total billable   min of TIMED therapeutic procedures and modalities.   8-22 min = 1 unit; 23-37 min = 2 units; 38-52 min = 3 units; 53-67 min = 4 units; 68-82 min = 5 units            SUBJECTIVE    Pain Level (0-10 scale): 0/10    Any medication changes, allergies to medications, adverse drug reactions, diagnosis change, or new procedure performed?: [x] No    [] Yes (see summary sheet for update)  Medications: Verified on Patient Summary List    Subjective functional status/changes:     Pt reports that he went to the driving range with no pain while performing, but mild low back pain a few hours after which went away quickly.      OBJECTIVE      Therapeutic Procedures:  Tx Min Billable or 1:1 Min (if diff from Tx Min) Procedure, Rationale, Specifics   0  47552 Manual Therapy (timed):  decrease pain, increase ROM, increase tissue extensibility, and decrease trigger points to improve patient's ability to progress to PLOF and address remaining functional goals.  The manual therapy interventions were performed at a separate and distinct time from the therapeutic activities interventions . (see flow sheet as applicable)     Details if applicable:  STM L QL and piriformis   55 40 68781 Therapeutic Exercise (timed):  increase ROM, strength, coordination, balance, and

## 2024-01-30 NOTE — PROGRESS NOTES
Medicare Annual Wellness Visit    Angel Tsai is here for Medication Refill (Discuss medications ), Hypertension, and Back Pain    Assessment & Plan   Essential hypertension  -     CBC; Future  -     Comprehensive Metabolic Panel; Future  Pure hypercholesterolemia  -     Comprehensive Metabolic Panel; Future  -     Lipid Panel; Future  Venous insufficiency of both lower extremities  Benign prostatic hyperplasia (BPH) with straining on urination  Medicare annual wellness visit, subsequent  Prediabetes  -     Hemoglobin A1C; Future  -     CBC; Future  -     Comprehensive Metabolic Panel; Future    Recommendations for Preventive Services Due: see orders and patient instructions/AVS.  Recommended screening schedule for the next 5-10 years is provided to the patient in written form: see Patient Instructions/AVS.     Return in about 6 months (around 7/17/2024).     Subjective       Patient's complete Health Risk Assessment and screening values have been reviewed and are found in Flowsheets. The following problems were reviewed today and where indicated follow up appointments were made and/or referrals ordered.    Positive Risk Factor Screenings with Interventions:                       Tobacco Use:  Tobacco Use: High Risk (1/17/2024)    Patient History     Smoking Tobacco Use: Some Days     Smokeless Tobacco Use: Never     Passive Exposure: Past     E-cigarette/Vaping       Questions Responses    E-cigarette/Vaping Use Never User    Start Date     Passive Exposure No    Quit Date     Counseling Given No    Comments           Interventions:                        Objective   Vitals:    01/17/24 0826   BP: 124/72   Site: Left Upper Arm   Position: Sitting   Cuff Size: Medium Adult   Pulse: 72   Resp: 16   Temp: 98 °F (36.7 °C)   TempSrc: Oral   SpO2: 99%   Weight: 80.7 kg (178 lb)   Height: 1.753 m (5' 9\")      Body mass index is 26.29 kg/m².             Allergies   Allergen Reactions    Penicillins Hives and Swelling

## 2024-01-30 NOTE — PROGRESS NOTES
Subjective    Angel Tsai is a 78 y.o. male who presents today for the following:  Chief Complaint   Patient presents with    Medication Refill     Discuss medications     Hypertension    Back Pain       Pt. comes in for f/u. Has a few chronic medical issues as documented.    Recent low back pain with sciatica has improved with steroids, muscle relaxants and PT.  Denies any trauma or falls.  Travel to [place] and returned recently.    Remains on medications for BPH.  Followed by urologist.  No dysuria or hematuria.    Hypertension stable on current medications.  No side effects.    All other chronic medical issues are stable on current treatment regimen.  Has had Covid-19 vaccination. Reports taking proper precautions. Denies any related signs or symptoms.    PMH/PSH/Allergies/Social History/medication list and most recent studies reviewed with patient.    Reports compliance with medications and diet. Trying to be active physically to control weight. Reports no other new c/o.     Social History     Tobacco Use   Smoking Status Some Days    Types: Cigars    Passive exposure: Past   Smokeless Tobacco Never     Social History     Substance and Sexual Activity   Alcohol Use Yes    Comment: Rarely         Past Medical History:   Diagnosis Date    Arthritis     knee    ED (erectile dysfunction)     Hemorrhoids     Hypercholesterolemia     Hypertension     Other ill-defined conditions(869.89)     hx phlebitis left leg       Allergies   Allergen Reactions    Penicillins Hives and Swelling     Throat and face  Throat and face    Sulfa Antibiotics Hives and Other (See Comments)     Nausea,sweating,cold sweats    Sulfamethoxazole-Trimethoprim Hives     Other reaction(s): Unknown (comments)  Nausea,sweating,cold sweats    Lisinopril Angioedema and Swelling    Sulfamethoxazole Hives        Current Outpatient Medications on File Prior to Visit   Medication Sig Dispense Refill    losartan (COZAAR) 50 MG tablet TAKE 1 TABLET

## 2024-01-30 NOTE — THERAPY DISCHARGE
Physical Therapy at [facility],   a part of [facility]  [address] [address]  Phone: [phone]  Fax: [phone]     DISCHARGE SUMMARY  Patient Name: Angel Tsai : 1945   Treatment/Medical Diagnosis: Arthritis [M19.90]  Pain of back and left lower extremity [M54.9, M79.605]   Referral Source: Evan Angel DO     Date of Initial Visit: 10/31/23 Attended Visits: 20 Missed Visits: 0     SUMMARY OF TREATMENT  Pt has been seen for 20 visits for low back pain addressing ROM, flexibility, and strength deficits. He has made significant progress with all aspects and understands which stretches and exercises to perform to help reduce pain. He has met all goals at this time and reached maximal benefit from skilled PT services.     CURRENT STATUS    Progress toward goals / Updated goals:  []  See Progress Note/Recertification    Short Term Goals: To be accomplished in 4-6 treatments.  Pt will be ind with HEP. Met  Pt will be able to reduce pain to no greater than 3/10 with flare ups. Met  Long Term Goals: To be accomplished in 24 treatments.  Pt will be able to walk for 1 mile without increased symptoms. Met  Pt will be able to play golf with pain no greater than 2/10. Met  Pt will improve FOTO by 10 points for improved functional ability. Met  Pt will be able to navigate flight of stairs with step over step gait pattern without increased symptoms. Met        RECOMMENDATIONS  Discontinue therapy. Progressing towards or have reached established goals.        Letitia Stewart, PT       2024       12:53 PM    If you have any questions/comments please contact us directly at [phone].   Thank you for allowing us to assist in the care of your patient.

## 2024-02-15 DIAGNOSIS — M79.605 PAIN OF BACK AND LEFT LOWER EXTREMITY: Primary | ICD-10-CM

## 2024-02-15 DIAGNOSIS — M54.9 PAIN OF BACK AND LEFT LOWER EXTREMITY: Primary | ICD-10-CM

## 2024-02-15 RX ORDER — DICLOFENAC SODIUM 75 MG/1
75 TABLET, DELAYED RELEASE ORAL 2 TIMES DAILY
Qty: 60 TABLET | Refills: 2 | Status: SHIPPED | OUTPATIENT
Start: 2024-02-15

## 2024-02-16 PROBLEM — Z00.00 MEDICARE ANNUAL WELLNESS VISIT, SUBSEQUENT: Status: RESOLVED | Noted: 2018-07-13 | Resolved: 2024-02-16

## 2024-02-19 DIAGNOSIS — I10 ESSENTIAL HYPERTENSION: Primary | ICD-10-CM

## 2024-02-19 RX ORDER — HYDROCHLOROTHIAZIDE 25 MG/1
25 TABLET ORAL DAILY
Qty: 90 TABLET | Refills: 1 | Status: SHIPPED | OUTPATIENT
Start: 2024-02-19

## 2024-06-11 DIAGNOSIS — M79.605 PAIN OF BACK AND LEFT LOWER EXTREMITY: ICD-10-CM

## 2024-06-11 DIAGNOSIS — M54.9 PAIN OF BACK AND LEFT LOWER EXTREMITY: ICD-10-CM

## 2024-06-12 RX ORDER — DICLOFENAC SODIUM 75 MG/1
75 TABLET, DELAYED RELEASE ORAL 2 TIMES DAILY
Qty: 60 TABLET | Refills: 2 | Status: SHIPPED | OUTPATIENT
Start: 2024-06-12

## 2024-06-24 ENCOUNTER — OFFICE VISIT (OUTPATIENT)
Age: 79
End: 2024-06-24

## 2024-06-24 VITALS
RESPIRATION RATE: 18 BRPM | HEIGHT: 70 IN | OXYGEN SATURATION: 97 % | TEMPERATURE: 98.3 F | WEIGHT: 176.8 LBS | BODY MASS INDEX: 25.31 KG/M2 | DIASTOLIC BLOOD PRESSURE: 84 MMHG | SYSTOLIC BLOOD PRESSURE: 120 MMHG | HEART RATE: 80 BPM

## 2024-06-24 DIAGNOSIS — J30.9 ALLERGIC SINUSITIS: Primary | ICD-10-CM

## 2024-06-24 DIAGNOSIS — R05.9 COUGH, UNSPECIFIED TYPE: ICD-10-CM

## 2024-06-24 RX ORDER — BENZONATATE 100 MG/1
100 CAPSULE ORAL 3 TIMES DAILY PRN
Qty: 30 CAPSULE | Refills: 0 | Status: SHIPPED | OUTPATIENT
Start: 2024-06-24 | End: 2024-07-04

## 2024-06-24 RX ORDER — DEXTROMETHORPHAN HYDROBROMIDE AND PROMETHAZINE HYDROCHLORIDE 15; 6.25 MG/5ML; MG/5ML
5 SYRUP ORAL 4 TIMES DAILY PRN
Qty: 118 ML | Refills: 0 | Status: SHIPPED | OUTPATIENT
Start: 2024-06-24 | End: 2024-07-01

## 2024-06-24 RX ORDER — METHYLPREDNISOLONE 4 MG/1
4 TABLET ORAL SEE ADMIN INSTRUCTIONS
Qty: 1 KIT | Refills: 0 | Status: SHIPPED | OUTPATIENT
Start: 2024-06-24

## 2024-06-24 ASSESSMENT — ENCOUNTER SYMPTOMS
COUGH: 1
GASTROINTESTINAL NEGATIVE: 1
EYES NEGATIVE: 1

## 2024-06-24 NOTE — PROGRESS NOTES
2024   Angel Tsai (: 1945) is a 78 y.o. male, New patient, here for evaluation of the following chief complaint(s):  Cough (Productive cough x 21/2 week otc mucinex not helping )     ASSESSMENT/PLAN:  Below is the assessment and plan developed based on review of pertinent history, physical exam, labs, studies, and medications.  1. Allergic sinusitis  -     methylPREDNISolone (MEDROL, LONG,) 4 MG tablet; Take 1 tablet by mouth See Admin Instructions Take by mouth., Disp-1 kit, R-0Normal  2. Cough, unspecified type  -     promethazine-dextromethorphan (PROMETHAZINE-DM) 6.25-15 MG/5ML syrup; Take 5 mLs by mouth 4 times daily as needed for Cough, Disp-118 mL, R-0Normal  -     benzonatate (TESSALON) 100 MG capsule; Take 1 capsule by mouth 3 times daily as needed for Cough, Disp-30 capsule, R-0Normal         Handout given with care instructions  2. OTC for symptom management. Increase fluid intake, ensure adequate nutritional intake.  3. Follow up with PCP as needed.  4. Go to ED with development of any acute symptoms.     Follow up:  Return if symptoms worsen or fail to improve.  Follow up immediately for any new, worsening or changes or if symptoms are not improving over the next 5-7 days.     SUBJECTIVE/OBJECTIVE:    Cough  Associated symptoms include postnasal drip. His past medical history is significant for environmental allergies.        Diagnoses and all orders for this visit:  Allergic sinusitis  Cough (Productive cough x 21/2 week otc mucinex not helping )  Angel Tsai is a 78 y.o. male who complains of post nasal drip and cough described as productive of clear  sputum for 2 weeks . He denies a history of fatigue, fevers, myalgias, and shortness of breath and denies a history of asthma.  Patient has environmental/ seasonal allergies. Patient has exposure to smoke / cigarettes.Patient denies exposure to  sick contacts . Patient also noted that OTC remedies did not help. PROGRESSION: STABLE

## 2024-07-01 DIAGNOSIS — I10 ESSENTIAL HYPERTENSION: ICD-10-CM

## 2024-07-01 RX ORDER — LOSARTAN POTASSIUM 50 MG/1
TABLET ORAL
Qty: 90 TABLET | Refills: 0 | Status: SHIPPED | OUTPATIENT
Start: 2024-07-01

## 2024-07-08 ENCOUNTER — OFFICE VISIT (OUTPATIENT)
Age: 79
End: 2024-07-08

## 2024-07-08 VITALS
HEIGHT: 70 IN | OXYGEN SATURATION: 95 % | TEMPERATURE: 97.7 F | HEART RATE: 85 BPM | BODY MASS INDEX: 24.77 KG/M2 | WEIGHT: 173 LBS | SYSTOLIC BLOOD PRESSURE: 126 MMHG | DIASTOLIC BLOOD PRESSURE: 82 MMHG | RESPIRATION RATE: 18 BRPM

## 2024-07-08 DIAGNOSIS — R05.9 COUGH IN ADULT: Primary | ICD-10-CM

## 2024-07-08 RX ORDER — AZITHROMYCIN 250 MG/1
TABLET, FILM COATED ORAL
Qty: 6 TABLET | Refills: 0 | Status: SHIPPED | OUTPATIENT
Start: 2024-07-08 | End: 2024-07-18

## 2024-07-08 ASSESSMENT — ENCOUNTER SYMPTOMS
VOMITING: 0
SORE THROAT: 0
DIARRHEA: 0
NAUSEA: 0
COUGH: 1

## 2024-07-08 NOTE — PROGRESS NOTES
Subjective     Chief Complaint   Patient presents with    Cough     Coughing w/ some mucus; last seen 2 weeks ago         Cough  Pertinent negatives include no chills, ear pain, fever or sore throat.    78-year-old male presents for a cough going on for the past 2 weeks.  He was seen and was given a prednisone some Tessalon Perles but now cough is still present.  No fever chills nausea vomiting    Past Medical History:   Diagnosis Date    Arthritis     knee    ED (erectile dysfunction)     Hemorrhoids     Hypercholesterolemia     Hypertension     Other ill-defined conditions(799.89)     hx phlebitis left leg       Past Surgical History:   Procedure Laterality Date    CATARACT REMOVAL      bilateral then bilateral laser surgery to open back of eye to let more light in    EYE SURGERY      HERNIA REPAIR      inguinal hernia repair    JOINT REPLACEMENT      ORTHOPEDIC SURGERY      8 left knee surgeries - arthroscopies/ACL repair/knee replacement    RETINAL DETACHMENT SURGERY      2 right eye/1 left eye to repair retina and macula    ROOT CANAL Bilateral     SQUAMOUS CELL CARCINOMA EXCISION      Nose    TONSILLECTOMY      VASCULAR SURGERY      VASECTOMY         Family History   Problem Relation Age of Onset    No Known Problems Father     Lung Disease Mother        Allergies   Allergen Reactions    Penicillins Hives and Swelling     Throat and face  Throat and face    Sulfa Antibiotics Hives and Other (See Comments)     Nausea,sweating,cold sweats    Sulfamethoxazole-Trimethoprim Hives     Other reaction(s): Unknown (comments)  Nausea,sweating,cold sweats    Lisinopril Angioedema and Swelling    Sulfamethoxazole Hives       Social History     Tobacco Use    Smoking status: Some Days     Types: Cigars     Passive exposure: Past    Smokeless tobacco: Never   Vaping Use    Vaping Use: Never used   Substance Use Topics    Alcohol use: Yes     Comment: Rarely/occ    Drug use: No       Vitals:    07/08/24 1452   BP: 126/82

## 2024-07-10 LAB
ERYTHROCYTE [DISTWIDTH] IN BLOOD BY AUTOMATED COUNT: 12.5 % (ref 11.6–15.4)
HCT VFR BLD AUTO: 38.9 % (ref 37.5–51)
HGB BLD-MCNC: 13.6 G/DL (ref 13–17.7)
MCH RBC QN AUTO: 31.7 PG (ref 26.6–33)
MCHC RBC AUTO-ENTMCNC: 35 G/DL (ref 31.5–35.7)
MCV RBC AUTO: 91 FL (ref 79–97)
PLATELET # BLD AUTO: 209 X10E3/UL (ref 150–450)
RBC # BLD AUTO: 4.29 X10E6/UL (ref 4.14–5.8)
WBC # BLD AUTO: 7.1 X10E3/UL (ref 3.4–10.8)

## 2024-07-11 ENCOUNTER — TELEPHONE (OUTPATIENT)
Age: 79
End: 2024-07-11

## 2024-07-11 LAB
ALBUMIN SERPL-MCNC: 3.8 G/DL (ref 3.8–4.8)
ALP SERPL-CCNC: 79 IU/L (ref 44–121)
ALT SERPL-CCNC: 41 IU/L (ref 0–44)
AST SERPL-CCNC: 31 IU/L (ref 0–40)
BILIRUB SERPL-MCNC: 0.4 MG/DL (ref 0–1.2)
BUN SERPL-MCNC: 30 MG/DL (ref 8–27)
BUN/CREAT SERPL: 30 (ref 10–24)
CALCIUM SERPL-MCNC: 9.6 MG/DL (ref 8.6–10.2)
CHLORIDE SERPL-SCNC: 105 MMOL/L (ref 96–106)
CHOLEST SERPL-MCNC: 124 MG/DL (ref 100–199)
CO2 SERPL-SCNC: 25 MMOL/L (ref 20–29)
CREAT SERPL-MCNC: 0.99 MG/DL (ref 0.76–1.27)
EGFRCR SERPLBLD CKD-EPI 2021: 78 ML/MIN/1.73
GLOBULIN SER CALC-MCNC: 2.3 G/DL (ref 1.5–4.5)
GLUCOSE SERPL-MCNC: 103 MG/DL (ref 70–99)
HBA1C MFR BLD: 5.8 % (ref 4.8–5.6)
HDLC SERPL-MCNC: 54 MG/DL
IMP & REVIEW OF LAB RESULTS: NORMAL
LDLC SERPL CALC-MCNC: 54 MG/DL (ref 0–99)
POTASSIUM SERPL-SCNC: 4.4 MMOL/L (ref 3.5–5.2)
PROT SERPL-MCNC: 6.1 G/DL (ref 6–8.5)
SODIUM SERPL-SCNC: 141 MMOL/L (ref 134–144)
TRIGL SERPL-MCNC: 81 MG/DL (ref 0–149)
VLDLC SERPL CALC-MCNC: 16 MG/DL (ref 5–40)

## 2024-07-11 NOTE — TELEPHONE ENCOUNTER
Angel Tsai was called with no answer, message on  left to call back regarding note below.     ----- Message from TRACIE Recinos CNP sent at 7/11/2024  8:35 AM EDT -----  A1c in prediabetes range, cut back on carbs/sugars and exercise as tolerated. Kidneys show some dehydration, increase water intake. Other labs are stable.

## 2024-07-17 ENCOUNTER — OFFICE VISIT (OUTPATIENT)
Age: 79
End: 2024-07-17
Payer: MEDICARE

## 2024-07-17 VITALS
WEIGHT: 169 LBS | HEIGHT: 70 IN | DIASTOLIC BLOOD PRESSURE: 88 MMHG | OXYGEN SATURATION: 95 % | HEART RATE: 74 BPM | SYSTOLIC BLOOD PRESSURE: 126 MMHG | BODY MASS INDEX: 24.2 KG/M2

## 2024-07-17 DIAGNOSIS — N40.1 BENIGN PROSTATIC HYPERPLASIA (BPH) WITH STRAINING ON URINATION: ICD-10-CM

## 2024-07-17 DIAGNOSIS — R73.03 PREDIABETES: ICD-10-CM

## 2024-07-17 DIAGNOSIS — E78.00 PURE HYPERCHOLESTEROLEMIA: ICD-10-CM

## 2024-07-17 DIAGNOSIS — R39.16 BENIGN PROSTATIC HYPERPLASIA (BPH) WITH STRAINING ON URINATION: ICD-10-CM

## 2024-07-17 DIAGNOSIS — I10 ESSENTIAL HYPERTENSION: Primary | ICD-10-CM

## 2024-07-17 DIAGNOSIS — I87.2 VENOUS INSUFFICIENCY OF BOTH LOWER EXTREMITIES: ICD-10-CM

## 2024-07-17 PROCEDURE — G8427 DOCREV CUR MEDS BY ELIG CLIN: HCPCS | Performed by: INTERNAL MEDICINE

## 2024-07-17 PROCEDURE — 3079F DIAST BP 80-89 MM HG: CPT | Performed by: INTERNAL MEDICINE

## 2024-07-17 PROCEDURE — 99214 OFFICE O/P EST MOD 30 MIN: CPT | Performed by: INTERNAL MEDICINE

## 2024-07-17 PROCEDURE — G8420 CALC BMI NORM PARAMETERS: HCPCS | Performed by: INTERNAL MEDICINE

## 2024-07-17 PROCEDURE — 4004F PT TOBACCO SCREEN RCVD TLK: CPT | Performed by: INTERNAL MEDICINE

## 2024-07-17 PROCEDURE — 1123F ACP DISCUSS/DSCN MKR DOCD: CPT | Performed by: INTERNAL MEDICINE

## 2024-07-17 PROCEDURE — 3074F SYST BP LT 130 MM HG: CPT | Performed by: INTERNAL MEDICINE

## 2024-07-17 NOTE — PROGRESS NOTES
Chief Complaint   Patient presents with    6 Month Follow-Up     \"Have you been to the ER, urgent care clinic since your last visit?  Hospitalized since your last visit?\"    YES - When: approximately 1  weeks ago.  Where and Why: [facility].    “Have you seen or consulted any other health care providers outside of [facility] since your last visit?”    NO            Click Here for Release of Records Request

## 2024-07-29 ASSESSMENT — ENCOUNTER SYMPTOMS
EYES NEGATIVE: 1
RESPIRATORY NEGATIVE: 1
ALLERGIC/IMMUNOLOGIC NEGATIVE: 1
SHORTNESS OF BREATH: 0
GASTROINTESTINAL NEGATIVE: 1
ABDOMINAL PAIN: 0

## 2024-07-30 DIAGNOSIS — M79.605 PAIN OF BACK AND LEFT LOWER EXTREMITY: ICD-10-CM

## 2024-07-30 DIAGNOSIS — M54.9 PAIN OF BACK AND LEFT LOWER EXTREMITY: ICD-10-CM

## 2024-07-30 NOTE — PROGRESS NOTES
Subjective    Angel Tsai is a 79 y.o. male who presents today for the following:  Chief Complaint   Patient presents with    6 Month Follow-Up       History of Present Illness  The patient presents for evaluation of multiple medical concerns.    In June 2023, he experienced a persistent cough, prompting a visit to urgent care. There, he was diagnosed with a grass allergy, a condition known as 8 out of 10 individuals. Allergy testing was not conducted. He was treated with cough medication, steroids, and an antibiotic, both of which he has since completed. His lungs were examined during both visits. He was prescribed Mucinex DM and Allegra. His cough now occurs once or twice a day.    He continues to take his blood pressure medication, Crestor for cholesterol, Detrol and Flomax for prostate health. His urination is normal. He denies experiencing headaches, dizziness, chest pain, or shortness of breath. He also denies any other gastrointestinal issues. He maintains an active lifestyle, participating in a match in the heat yesterday. He consumes water and Gatorade. He denies any swelling in his legs or knees.    Supplemental Information  He went to the dermatologist for 3 spots that are superficial. The report states 2 basal cells and 1 squamous cell carcinoma. They did not take out those spots. They just did testing and will call and set up an appointment for them. They also did shave biopsies.    PMH/PSH/Allergies/Social History/medication list and most recent studies reviewed with patient.    Reports compliance with medications and diet. Trying to be active physically to control weight. Reports no other new c/o.     Social History     Tobacco Use   Smoking Status Some Days    Types: Cigars    Passive exposure: Past   Smokeless Tobacco Never     Social History     Substance and Sexual Activity   Alcohol Use Yes    Comment: Rarely/occ         Past Medical History:   Diagnosis Date    Arthritis     knee    ED

## 2024-07-31 DIAGNOSIS — M79.605 PAIN OF BACK AND LEFT LOWER EXTREMITY: ICD-10-CM

## 2024-07-31 DIAGNOSIS — M54.9 PAIN OF BACK AND LEFT LOWER EXTREMITY: ICD-10-CM

## 2024-07-31 RX ORDER — DICLOFENAC SODIUM 75 MG/1
75 TABLET, DELAYED RELEASE ORAL 2 TIMES DAILY
Qty: 90 TABLET | Refills: 1 | Status: SHIPPED | OUTPATIENT
Start: 2024-07-31 | End: 2024-07-31 | Stop reason: SDUPTHER

## 2024-07-31 RX ORDER — DICLOFENAC SODIUM 75 MG/1
75 TABLET, DELAYED RELEASE ORAL DAILY
Qty: 90 TABLET | Refills: 1 | Status: SHIPPED | OUTPATIENT
Start: 2024-07-31

## 2024-08-19 DIAGNOSIS — I10 ESSENTIAL HYPERTENSION: ICD-10-CM

## 2024-08-19 RX ORDER — HYDROCHLOROTHIAZIDE 25 MG/1
25 TABLET ORAL DAILY
Qty: 90 TABLET | Refills: 3 | Status: SHIPPED | OUTPATIENT
Start: 2024-08-19

## 2024-09-27 DIAGNOSIS — I10 ESSENTIAL HYPERTENSION: ICD-10-CM

## 2024-09-27 RX ORDER — LOSARTAN POTASSIUM 50 MG/1
TABLET ORAL
Qty: 90 TABLET | Refills: 1 | Status: SHIPPED | OUTPATIENT
Start: 2024-09-27

## 2024-12-16 ENCOUNTER — TELEPHONE (OUTPATIENT)
Age: 79
End: 2024-12-16

## 2024-12-16 NOTE — TELEPHONE ENCOUNTER
----- Message from Ainsley JAMISON sent at 12/16/2024  1:48 PM EST -----  Regarding: [facility] Appointment Request  [facility] Appointment Request    Patient needs appointment for [facility] Appointment Type: Existing Condition Follow Up. 6 months follow up    Patient Requested Dates(s): January 21,2025 or January 22,2025 or [facility] 23,2025  Patient Requested Time: after 2:00 pm on 22nd and  after 11:00 on 23rd and 21st  Provider Name: Rut Jackman MD    Reason for Appointment Request: Established Patient - Available appointments did not meet patient need    Additional Information: Patient have an appointment on January 22,2025 at 9:00 am for his 6 months follow up but there's something came up and he have other appointment at exactly 9:00 am this day, As I checked the next availability it is on February 17 and patient wants to have the appointment same day but different time or closer to January 22,2025 so that he can have a refill for his medication prior to February 17,2025. If there is anything on the week of January 6, he is available anytime.   --------------------------------------------------------------------------------------------------------------------------    Relationship to Patient: Self     Call Back Information: OK to leave message on [facility]  Preferred Call Back Number: Phone [phone]

## 2025-01-13 LAB
BASOPHILS # BLD AUTO: 0.1 X10E3/UL (ref 0–0.2)
BASOPHILS NFR BLD AUTO: 1 %
EOSINOPHIL # BLD AUTO: 0.4 X10E3/UL (ref 0–0.4)
EOSINOPHIL NFR BLD AUTO: 6 %
ERYTHROCYTE [DISTWIDTH] IN BLOOD BY AUTOMATED COUNT: 13 % (ref 11.6–15.4)
HBA1C MFR BLD: 5.4 % (ref 4.8–5.6)
HCT VFR BLD AUTO: 41.4 % (ref 37.5–51)
HGB BLD-MCNC: 13.5 G/DL (ref 13–17.7)
IMM GRANULOCYTES # BLD AUTO: 0 X10E3/UL (ref 0–0.1)
IMM GRANULOCYTES NFR BLD AUTO: 0 %
LYMPHOCYTES # BLD AUTO: 2.6 X10E3/UL (ref 0.7–3.1)
LYMPHOCYTES NFR BLD AUTO: 35 %
MCH RBC QN AUTO: 30.5 PG (ref 26.6–33)
MCHC RBC AUTO-ENTMCNC: 32.6 G/DL (ref 31.5–35.7)
MCV RBC AUTO: 94 FL (ref 79–97)
MONOCYTES # BLD AUTO: 0.4 X10E3/UL (ref 0.1–0.9)
MONOCYTES NFR BLD AUTO: 6 %
NEUTROPHILS # BLD AUTO: 3.8 X10E3/UL (ref 1.4–7)
NEUTROPHILS NFR BLD AUTO: 52 %
PLATELET # BLD AUTO: 209 X10E3/UL (ref 150–450)
RBC # BLD AUTO: 4.43 X10E6/UL (ref 4.14–5.8)
WBC # BLD AUTO: 7.2 X10E3/UL (ref 3.4–10.8)

## 2025-01-14 LAB
ALBUMIN SERPL-MCNC: 4.2 G/DL (ref 3.8–4.8)
ALP SERPL-CCNC: 69 IU/L (ref 44–121)
ALT SERPL-CCNC: 24 IU/L (ref 0–44)
AST SERPL-CCNC: 20 IU/L (ref 0–40)
BILIRUB SERPL-MCNC: 0.4 MG/DL (ref 0–1.2)
BUN SERPL-MCNC: 22 MG/DL (ref 8–27)
BUN/CREAT SERPL: 24 (ref 10–24)
CALCIUM SERPL-MCNC: 10 MG/DL (ref 8.6–10.2)
CHLORIDE SERPL-SCNC: 107 MMOL/L (ref 96–106)
CHOLEST SERPL-MCNC: 141 MG/DL (ref 100–199)
CO2 SERPL-SCNC: 24 MMOL/L (ref 20–29)
CREAT SERPL-MCNC: 0.9 MG/DL (ref 0.76–1.27)
EGFRCR SERPLBLD CKD-EPI 2021: 87 ML/MIN/1.73
GLOBULIN SER CALC-MCNC: 2.7 G/DL (ref 1.5–4.5)
GLUCOSE SERPL-MCNC: 102 MG/DL (ref 70–99)
HDLC SERPL-MCNC: 57 MG/DL
IMP & REVIEW OF LAB RESULTS: NORMAL
LDLC SERPL CALC-MCNC: 65 MG/DL (ref 0–99)
POTASSIUM SERPL-SCNC: 4.1 MMOL/L (ref 3.5–5.2)
PROT SERPL-MCNC: 6.9 G/DL (ref 6–8.5)
SODIUM SERPL-SCNC: 146 MMOL/L (ref 134–144)
TRIGL SERPL-MCNC: 101 MG/DL (ref 0–149)
VLDLC SERPL CALC-MCNC: 19 MG/DL (ref 5–40)

## 2025-01-20 SDOH — ECONOMIC STABILITY: FOOD INSECURITY: WITHIN THE PAST 12 MONTHS, THE FOOD YOU BOUGHT JUST DIDN'T LAST AND YOU DIDN'T HAVE MONEY TO GET MORE.: NEVER TRUE

## 2025-01-20 SDOH — ECONOMIC STABILITY: INCOME INSECURITY: IN THE LAST 12 MONTHS, WAS THERE A TIME WHEN YOU WERE NOT ABLE TO PAY THE MORTGAGE OR RENT ON TIME?: NO

## 2025-01-20 SDOH — ECONOMIC STABILITY: FOOD INSECURITY: WITHIN THE PAST 12 MONTHS, YOU WORRIED THAT YOUR FOOD WOULD RUN OUT BEFORE YOU GOT MONEY TO BUY MORE.: NEVER TRUE

## 2025-01-20 SDOH — ECONOMIC STABILITY: TRANSPORTATION INSECURITY
IN THE PAST 12 MONTHS, HAS THE LACK OF TRANSPORTATION KEPT YOU FROM MEDICAL APPOINTMENTS OR FROM GETTING MEDICATIONS?: NO

## 2025-01-20 SDOH — ECONOMIC STABILITY: TRANSPORTATION INSECURITY
IN THE PAST 12 MONTHS, HAS LACK OF TRANSPORTATION KEPT YOU FROM MEETINGS, WORK, OR FROM GETTING THINGS NEEDED FOR DAILY LIVING?: NO

## 2025-01-20 ASSESSMENT — LIFESTYLE VARIABLES
HOW MANY STANDARD DRINKS CONTAINING ALCOHOL DO YOU HAVE ON A TYPICAL DAY: 1
HOW OFTEN DO YOU HAVE SIX OR MORE DRINKS ON ONE OCCASION: 1
HOW OFTEN DO YOU HAVE A DRINK CONTAINING ALCOHOL: 4
HOW OFTEN DO YOU HAVE A DRINK CONTAINING ALCOHOL: 2-3 TIMES A WEEK
HOW MANY STANDARD DRINKS CONTAINING ALCOHOL DO YOU HAVE ON A TYPICAL DAY: 1 OR 2

## 2025-01-20 ASSESSMENT — PATIENT HEALTH QUESTIONNAIRE - PHQ9
SUM OF ALL RESPONSES TO PHQ QUESTIONS 1-9: 0
2. FEELING DOWN, DEPRESSED OR HOPELESS: NOT AT ALL
SUM OF ALL RESPONSES TO PHQ QUESTIONS 1-9: 0
SUM OF ALL RESPONSES TO PHQ9 QUESTIONS 1 & 2: 0
1. LITTLE INTEREST OR PLEASURE IN DOING THINGS: NOT AT ALL

## 2025-01-21 DIAGNOSIS — R73.01 ELEVATED FASTING GLUCOSE: ICD-10-CM

## 2025-01-21 DIAGNOSIS — R73.01 ELEVATED FASTING GLUCOSE: Primary | ICD-10-CM

## 2025-01-22 ENCOUNTER — OFFICE VISIT (OUTPATIENT)
Age: 80
End: 2025-01-22
Payer: MEDICARE

## 2025-01-22 VITALS
SYSTOLIC BLOOD PRESSURE: 160 MMHG | BODY MASS INDEX: 26.51 KG/M2 | OXYGEN SATURATION: 98 % | DIASTOLIC BLOOD PRESSURE: 88 MMHG | RESPIRATION RATE: 16 BRPM | TEMPERATURE: 97.9 F | HEART RATE: 76 BPM | WEIGHT: 179 LBS | HEIGHT: 69 IN

## 2025-01-22 DIAGNOSIS — N32.81 OAB (OVERACTIVE BLADDER): ICD-10-CM

## 2025-01-22 DIAGNOSIS — Z71.89 ACP (ADVANCE CARE PLANNING): ICD-10-CM

## 2025-01-22 DIAGNOSIS — R73.03 PREDIABETES: ICD-10-CM

## 2025-01-22 DIAGNOSIS — Z00.00 MEDICARE ANNUAL WELLNESS VISIT, SUBSEQUENT: ICD-10-CM

## 2025-01-22 DIAGNOSIS — E78.00 PURE HYPERCHOLESTEROLEMIA: ICD-10-CM

## 2025-01-22 DIAGNOSIS — I10 ESSENTIAL HYPERTENSION: Primary | ICD-10-CM

## 2025-01-22 DIAGNOSIS — M54.31 SCIATICA OF RIGHT SIDE: ICD-10-CM

## 2025-01-22 DIAGNOSIS — N52.9 ERECTILE DYSFUNCTION, UNSPECIFIED ERECTILE DYSFUNCTION TYPE: ICD-10-CM

## 2025-01-22 PROCEDURE — 99497 ADVNCD CARE PLAN 30 MIN: CPT | Performed by: INTERNAL MEDICINE

## 2025-01-22 PROCEDURE — 99214 OFFICE O/P EST MOD 30 MIN: CPT | Performed by: INTERNAL MEDICINE

## 2025-01-22 RX ORDER — LOSARTAN POTASSIUM 50 MG/1
50 TABLET ORAL DAILY
Qty: 90 TABLET | Refills: 1 | Status: SHIPPED | OUTPATIENT
Start: 2025-01-22

## 2025-01-22 RX ORDER — HYDROCHLOROTHIAZIDE 25 MG/1
25 TABLET ORAL DAILY
Qty: 90 TABLET | Refills: 1 | Status: SHIPPED | OUTPATIENT
Start: 2025-01-22

## 2025-01-22 RX ORDER — LIDOCAINE 50 MG/G
1 PATCH TOPICAL DAILY
Qty: 30 PATCH | Refills: 2 | Status: SHIPPED | OUTPATIENT
Start: 2025-01-22 | End: 2025-04-22

## 2025-01-22 RX ORDER — ROSUVASTATIN CALCIUM 40 MG/1
20 TABLET, COATED ORAL EVERY EVENING
Qty: 90 TABLET | Refills: 1 | Status: SHIPPED | OUTPATIENT
Start: 2025-01-22

## 2025-01-22 RX ORDER — TOLTERODINE TARTRATE 2 MG/1
2 TABLET, EXTENDED RELEASE ORAL DAILY
Qty: 90 TABLET | Refills: 3 | Status: SHIPPED | OUTPATIENT
Start: 2025-01-22

## 2025-01-22 ASSESSMENT — ENCOUNTER SYMPTOMS
RESPIRATORY NEGATIVE: 1
GASTROINTESTINAL NEGATIVE: 1
BACK PAIN: 1
EYES NEGATIVE: 1

## 2025-01-22 NOTE — PROGRESS NOTES
Chief Complaint   Patient presents with    Medicare AWV    Hypertension    Venous insufficiency of leg    DJD (degenerative joint disease)    Benign Prostatic Hypertrophy    Cholesterol Problem           History of Present Illness  Mr. Tsai is here to establish care.  His PCP just retired.  He has hypertension, compliant with medication.  Denies chest pain palpitation or shortness of breath.  Blood pressure has been slightly elevated today.  He mention at home his blood pressures are listed normal.  Has elevated lipid, on statin.  No myalgia.  Recent lab work done this week.  Lipid panel is normal.  He had prediabetes, A1c stable right now.  Has history of DJD in the knee and lower back.  He has been taking diclofenac for many years.  Kidney function tests remain normal.  He is wondering if he can continue it.  Report lower back pain radiating to right side of the leg.  No numbness or weakness in the leg.  No bowel bladder problem.  He has overactive bladder, taking Detrol, needs a refill.  He had recently seen urologist and received medications for erectile dysfunction this is a compounding medications and 1 tablet was causing him to have lightheaded.  He has been taking half tablet and he is feeling okay.  Vaccinations up-to-date, wondering if he can take RSV vaccine.  He may take eyes vaccine.    Past Medical History:   Diagnosis Date    Arthritis     knee    ED (erectile dysfunction)     Hemorrhoids     Hypercholesterolemia     Hypertension     Other ill-defined conditions(799.89)     hx phlebitis left leg     Review of Systems   Constitutional: Negative.    HENT: Negative.     Eyes: Negative.    Respiratory: Negative.     Cardiovascular: Negative.    Gastrointestinal: Negative.    Endocrine: Negative.    Genitourinary: Negative.    Musculoskeletal:  Positive for back pain.   Skin: Negative.    Neurological: Negative.    Hematological: Negative.    Psychiatric/Behavioral: Negative.         Vitals:    01/22/25

## 2025-01-22 NOTE — PROGRESS NOTES
Chief Complaint   Patient presents with    Medicare AWV    Hypertension    Venous insufficiency of leg    DJD (degenerative joint disease)    Benign Prostatic Hypertrophy    Cholesterol Problem     \"Have you been to the ER, urgent care clinic since your last visit?  Hospitalized since your last visit?\"    NO    “Have you seen or consulted any other health care providers outside our system since your last visit?”    NO

## 2025-01-22 NOTE — PROGRESS NOTES
Medicare Annual Wellness Visit    Angel Tsai is here for Medicare AWV, Hypertension, Venous insufficiency of leg, DJD (degenerative joint disease), Benign Prostatic Hypertrophy, and Cholesterol Problem    Assessment & Plan   Essential hypertension  -     losartan (COZAAR) 50 MG tablet; Take 1 tablet by mouth daily, Disp-90 tablet, R-1Normal  -     hydroCHLOROthiazide (HYDRODIURIL) 25 MG tablet; Take 1 tablet by mouth daily, Disp-90 tablet, R-1Normal  Pure hypercholesterolemia  -     rosuvastatin (CRESTOR) 40 MG tablet; Take 0.5 tablets by mouth every evening, Disp-90 tablet, R-1Normal  Medicare annual wellness visit, subsequent  Prediabetes  ACP (advance care planning)  OAB (overactive bladder)  -     tolterodine (DETROL) 2 MG tablet; Take 1 tablet by mouth daily, Disp-90 tablet, R-3Normal  Erectile dysfunction, unspecified erectile dysfunction type  Sciatica of right side  -     lidocaine (LIDODERM) 5 %; Place 1 patch onto the skin daily 12 hours on, 12 hours off., Disp-30 patch, R-2Normal  -     [facility] - Physical Therapy at [facility]       No follow-ups on file.     Subjective   Mr. Reed is here for Medicare wellness visit.  Has living will.  Memory seems great.  No gait or balance problem    Patient's complete Health Risk Assessment and screening values have been reviewed and are found in Flowsheets. The following problems were reviewed today and where indicated follow up appointments were made and/or referrals ordered.    Positive Risk Factor Screenings with Interventions:                    Safety:  Do you have non-slip mats or non-slip surfaces or shower bars or grab bars in your shower or bathtub?: (!) No    Interventions:  Patient declined any further interventions or treatment      Tobacco Use:    Tobacco Use      Smoking status: Some Days        Types: Cigars        Passive exposure: Past      Smokeless tobacco: Never     Interventions:  Patient advised to follow up in the office for

## 2025-01-22 NOTE — ACP (ADVANCE CARE PLANNING)

## 2025-01-28 DIAGNOSIS — E78.00 PURE HYPERCHOLESTEROLEMIA: Primary | ICD-10-CM

## 2025-01-28 RX ORDER — ROSUVASTATIN CALCIUM 40 MG/1
TABLET, COATED ORAL
Qty: 45 TABLET | Refills: 1 | Status: SHIPPED | OUTPATIENT
Start: 2025-01-28

## 2025-01-28 NOTE — TELEPHONE ENCOUNTER
[facility] called needing clarification on the script for    rosuvastatin (CRESTOR) 40 MG tablet   They need to make sure that the quantity and the directions are correct. Call back number is [phone] reference number is [id number]

## 2025-01-29 ENCOUNTER — TELEPHONE (OUTPATIENT)
Age: 80
End: 2025-01-29

## 2025-01-29 ENCOUNTER — HOSPITAL ENCOUNTER (OUTPATIENT)
Facility: HOSPITAL | Age: 80
Setting detail: RECURRING SERIES
Discharge: HOME | End: 2025-02-01
Attending: INTERNAL MEDICINE
Payer: MEDICARE

## 2025-01-29 PROCEDURE — 97110 THERAPEUTIC EXERCISES: CPT | Performed by: PHYSICAL THERAPIST

## 2025-01-29 PROCEDURE — 97140 MANUAL THERAPY 1/> REGIONS: CPT | Performed by: PHYSICAL THERAPIST

## 2025-01-29 PROCEDURE — 97161 PT EVAL LOW COMPLEX 20 MIN: CPT | Performed by: PHYSICAL THERAPIST

## 2025-01-29 NOTE — TELEPHONE ENCOUNTER
[facility] - [place], [address] - P [phone] - F [phone]     Pharmacy called to get clarification on the script for crestor.Call back number is [phone] Reference number is [id number]

## 2025-01-29 NOTE — TELEPHONE ENCOUNTER
Called back and gave directions:    rosuvastatin (CRESTOR) 40 MG tablet [[id number]]    Order Details  Dose, Route, Frequency: As Directed   Dispense Quantity: 45 tablet Refills: 1    Note to Pharmacy: DC previous script of (40mg with #90) should be 45 tablets for 90 days         Sig: Take 0.5 tablet daily (for 20mg total daily)

## 2025-01-29 NOTE — THERAPY EVALUATION
Physical Therapy at [facility],   a part of [facility]  [address] [address]  Phone:[phone] Fax:[phone]                                                                            PHYSICAL THERAPY - MEDICARE EVALUATION/PLAN OF CARE NOTE (updated 3/23)      Date: 2025          Patient Name:  Angel Tsai :  1945   Medical   Diagnosis:  Sciatica of right side [M54.31] Treatment Diagnosis:  M54.59  OTHER LOWER BACK PAIN    Referral Source:  Rut Jackman MD Provider #:  [id number]                  Insurance: Payor: MEDICARE / Plan: MEDICARE PART A AND B / Product Type: *No Product type* /      Patient  verified yes     Visit #   Current  / Total 1 MN   Time   In / Out 9:25 AM 10:33 AM   Total Treatment Time 68   Total Timed Codes 23   1:1 Treatment Time 23      [facility] Totals Reminder:  bill using total billable   min of TIMED therapeutic procedures and modalities.   8-22 min = 1 unit; 23-37 min = 2 units; 38-52 min = 3 units;  53-67 min = 4 units; 68-82 min = 5 units           SUBJECTIVE  Pain Level (0-10 scale): 5 when waking up, decreased currently  []constant [x]intermittent []improving []worsening []no change since onset    Any medication changes, allergies to medications, adverse drug reactions, diagnosis change, or new procedure performed?: [x] No    [] Yes (see summary sheet for update)  Medications: Verified on Patient Summary List    Subjective functional status/changes:     Patient presents with R lower back and hip pain, with radiating pain down R LE. Pain is worse with extended periods of sitting and with playing golf, and is worst in the morning. He feels that stretching makes it better, and has been doing his previous HEP including bridges, piriformis stretch, SKTC, and LTR. He had felt better after previous PT (ending 2024) until 2024 when the current episode started. Notes with current episodes he has had muscle

## 2025-02-03 ENCOUNTER — HOSPITAL ENCOUNTER (OUTPATIENT)
Facility: HOSPITAL | Age: 80
Setting detail: RECURRING SERIES
Discharge: HOME | End: 2025-02-06
Attending: INTERNAL MEDICINE
Payer: MEDICARE

## 2025-02-03 PROCEDURE — 97110 THERAPEUTIC EXERCISES: CPT

## 2025-02-03 PROCEDURE — 97140 MANUAL THERAPY 1/> REGIONS: CPT

## 2025-02-03 NOTE — PROGRESS NOTES
PHYSICAL THERAPY - MEDICARE DAILY TREATMENT NOTE (updated 3/23)      Date: 2/3/2025          Patient Name:  Angel Tsai :  1945   Medical   Diagnosis:  Sciatica of right side [M54.31] Treatment Diagnosis:  M54.59  OTHER LOWER BACK PAIN    Referral Source:  Rut Jackman MD Insurance:   Payor: MEDICARE / Plan: MEDICARE PART A AND B / Product Type: *No Product type* /                     Patient  verified yes     Visit #   Current  / Total 2 MN   Time   In / Out 12:55 PM 1:40 PM   Total Treatment Time 45   Total Timed Codes 45   1:1 Treatment Time 40      [facility] Totals Reminder:  bill using total billable   min of TIMED therapeutic procedures and modalities.   8-22 min = 1 unit; 23-37 min = 2 units; 38-52 min = 3 units; 53-67 min = 4 units; 68-82 min = 5 units            SUBJECTIVE    Pain Level (0-10 scale): not captured    Any medication changes, allergies to medications, adverse drug reactions, diagnosis change, or new procedure performed?: [x] No    [] Yes (see summary sheet for update)  Medications: Verified on Patient Summary List    Subjective functional status/changes:     Pt reports he is unsure if doing the hip flexor stretch correctly. Feels really stiff when waking up in the morning and better after stretches or when moving around.     OBJECTIVE      Therapeutic Procedures:  Tx Min Billable or 1:1 Min (if diff from Tx Min) Procedure, Rationale, Specifics   35 30 [id number] Therapeutic Exercise (timed):  increase ROM, strength, coordination, balance, and proprioception to improve patient's ability to progress to PLOF and address remaining functional goals. (see flow sheet as applicable)     Details if applicable:  progressed per flowsheet   10 18 72553 Manual Therapy (timed):  decrease pain, increase ROM, and increase tissue extensibility to improve patient's ability to progress to PLOF and address remaining functional goals.  The manual therapy interventions were performed at a separate and

## 2025-02-04 LAB
BASOPHILS # BLD AUTO: 0.1 X10E3/UL (ref 0–0.2)
BASOPHILS NFR BLD AUTO: 1 %
EOSINOPHIL # BLD AUTO: 0.4 X10E3/UL (ref 0–0.4)
EOSINOPHIL NFR BLD AUTO: 5 %
ERYTHROCYTE [DISTWIDTH] IN BLOOD BY AUTOMATED COUNT: 12.3 % (ref 11.6–15.4)
HBA1C MFR BLD: 5.4 % (ref 4.8–5.6)
HCT VFR BLD AUTO: 41 % (ref 37.5–51)
HGB BLD-MCNC: 13.2 G/DL (ref 13–17.7)
IMM GRANULOCYTES # BLD AUTO: 0 X10E3/UL (ref 0–0.1)
IMM GRANULOCYTES NFR BLD AUTO: 0 %
LYMPHOCYTES # BLD AUTO: 2.5 X10E3/UL (ref 0.7–3.1)
LYMPHOCYTES NFR BLD AUTO: 33 %
MCH RBC QN AUTO: 30.8 PG (ref 26.6–33)
MCHC RBC AUTO-ENTMCNC: 32.2 G/DL (ref 31.5–35.7)
MCV RBC AUTO: 96 FL (ref 79–97)
MONOCYTES # BLD AUTO: 0.5 X10E3/UL (ref 0.1–0.9)
MONOCYTES NFR BLD AUTO: 6 %
NEUTROPHILS # BLD AUTO: 4.2 X10E3/UL (ref 1.4–7)
NEUTROPHILS NFR BLD AUTO: 55 %
PLATELET # BLD AUTO: 197 X10E3/UL (ref 150–450)
RBC # BLD AUTO: 4.28 X10E6/UL (ref 4.14–5.8)
WBC # BLD AUTO: 7.7 X10E3/UL (ref 3.4–10.8)

## 2025-02-05 ENCOUNTER — HOSPITAL ENCOUNTER (OUTPATIENT)
Facility: HOSPITAL | Age: 80
Setting detail: RECURRING SERIES
Discharge: HOME | End: 2025-02-08
Attending: INTERNAL MEDICINE
Payer: MEDICARE

## 2025-02-05 LAB
ALBUMIN SERPL-MCNC: 4.2 G/DL (ref 3.8–4.8)
ALP SERPL-CCNC: 71 IU/L (ref 44–121)
ALT SERPL-CCNC: 21 IU/L (ref 0–44)
AST SERPL-CCNC: 22 IU/L (ref 0–40)
BILIRUB SERPL-MCNC: 0.6 MG/DL (ref 0–1.2)
BUN SERPL-MCNC: 26 MG/DL (ref 8–27)
BUN/CREAT SERPL: 23 (ref 10–24)
CALCIUM SERPL-MCNC: 10.2 MG/DL (ref 8.6–10.2)
CHLORIDE SERPL-SCNC: 105 MMOL/L (ref 96–106)
CHOLEST SERPL-MCNC: 128 MG/DL (ref 100–199)
CO2 SERPL-SCNC: 24 MMOL/L (ref 20–29)
CREAT SERPL-MCNC: 1.13 MG/DL (ref 0.76–1.27)
EGFRCR SERPLBLD CKD-EPI 2021: 66 ML/MIN/1.73
GLOBULIN SER CALC-MCNC: 2.2 G/DL (ref 1.5–4.5)
GLUCOSE SERPL-MCNC: 102 MG/DL (ref 70–99)
HDLC SERPL-MCNC: 56 MG/DL
IMP & REVIEW OF LAB RESULTS: NORMAL
LDLC SERPL CALC-MCNC: 52 MG/DL (ref 0–99)
POTASSIUM SERPL-SCNC: 4.1 MMOL/L (ref 3.5–5.2)
PROT SERPL-MCNC: 6.4 G/DL (ref 6–8.5)
SODIUM SERPL-SCNC: 145 MMOL/L (ref 134–144)
TRIGL SERPL-MCNC: 113 MG/DL (ref 0–149)
VLDLC SERPL CALC-MCNC: 20 MG/DL (ref 5–40)

## 2025-02-05 PROCEDURE — 97140 MANUAL THERAPY 1/> REGIONS: CPT | Performed by: PHYSICAL THERAPIST

## 2025-02-05 PROCEDURE — 97110 THERAPEUTIC EXERCISES: CPT | Performed by: PHYSICAL THERAPIST

## 2025-02-05 NOTE — PROGRESS NOTES
(see flow sheet as applicable)     Details if applicable:  STM to R QL   43     Total Total         [x]  Patient Education billed concurrently with other procedures   [x] Review HEP    [] Progressed/Changed HEP, detail:    [] Other detail:         Other Objective/Functional Measures  NT    Pain Level at end of session (0-10 scale): \"like I worked it\"      Assessment   Pt participated well with skilled PT services noting increased TTP in R QL today.  Patient will continue to benefit from skilled PT / OT services to modify and progress therapeutic interventions, analyze and address functional mobility deficits, analyze and address ROM deficits, analyze and address strength deficits, and analyze and address soft tissue restrictions to address functional deficits and attain remaining goals.    Progress toward goals / Updated goals:  []  See Progress Note/Recertification    NT      PLAN  Yes  Continue plan of care  Re-Cert Due: 4/29/25  []  Upgrade activities as tolerated  []  Discharge due to:  []  Other:      Letitia Stewart, PT       2/5/2025       9:00 AM

## 2025-02-10 ENCOUNTER — HOSPITAL ENCOUNTER (OUTPATIENT)
Facility: HOSPITAL | Age: 80
Setting detail: RECURRING SERIES
Discharge: HOME | End: 2025-02-13
Attending: INTERNAL MEDICINE
Payer: MEDICARE

## 2025-02-10 PROCEDURE — 97110 THERAPEUTIC EXERCISES: CPT

## 2025-02-10 PROCEDURE — 97140 MANUAL THERAPY 1/> REGIONS: CPT

## 2025-02-10 NOTE — PROGRESS NOTES
PHYSICAL THERAPY - MEDICARE DAILY TREATMENT NOTE (updated 3/23)      Date: 2/10/2025          Patient Name:  Angel Tsai :  1945   Medical   Diagnosis:  Sciatica of right side [M54.31] Treatment Diagnosis:  M54.59  OTHER LOWER BACK PAIN    Referral Source:  Rut Jackman MD Insurance:   Payor: MEDICARE / Plan: MEDICARE PART A AND B / Product Type: *No Product type* /                     Patient  verified yes     Visit #   Current  / Total 4 MN   Time   In / Out 955 AM 1043 AM   Total Treatment Time 48   Total Timed Codes 48   1:1 Treatment Time 38      [facility] Totals Reminder:  bill using total billable   min of TIMED therapeutic procedures and modalities.   8-22 min = 1 unit; 23-37 min = 2 units; 38-52 min = 3 units; 53-67 min = 4 units; 68-82 min = 5 units            SUBJECTIVE    Pain Level (0-10 scale): 0    Any medication changes, allergies to medications, adverse drug reactions, diagnosis change, or new procedure performed?: [x] No    [] Yes (see summary sheet for update)  Medications: Verified on Patient Summary List    Subjective functional status/changes:     Pt reports he had no pain this weekend, but felt pain this morning. Is better now.     OBJECTIVE      Therapeutic Procedures:  Tx Min Billable or 1:1 Min (if diff from Tx Min) Procedure, Rationale, Specifics   38 74 [id number] Therapeutic Exercise (timed):  increase ROM, strength, coordination, balance, and proprioception to improve patient's ability to progress to PLOF and address remaining functional goals. (see flow sheet as applicable)     Details if applicable:  progressed per flowsheet   10 90 12735 Manual Therapy (timed):  decrease pain, increase ROM, and increase tissue extensibility to improve patient's ability to progress to PLOF and address remaining functional goals.  The manual therapy interventions were performed at a separate and distinct time from the therapeutic activities interventions . (see flow sheet as applicable)

## 2025-02-12 ENCOUNTER — APPOINTMENT (OUTPATIENT)
Facility: HOSPITAL | Age: 80
End: 2025-02-12
Attending: INTERNAL MEDICINE
Payer: MEDICARE

## 2025-02-17 ENCOUNTER — HOSPITAL ENCOUNTER (OUTPATIENT)
Facility: HOSPITAL | Age: 80
Setting detail: RECURRING SERIES
Discharge: HOME | End: 2025-02-20
Attending: INTERNAL MEDICINE
Payer: MEDICARE

## 2025-02-17 PROCEDURE — 97110 THERAPEUTIC EXERCISES: CPT

## 2025-02-17 PROCEDURE — 97140 MANUAL THERAPY 1/> REGIONS: CPT

## 2025-02-17 NOTE — PROGRESS NOTES
PHYSICAL THERAPY - MEDICARE DAILY TREATMENT NOTE (updated 3/23)      Date: 2025          Patient Name:  Angel Tsai :  1945   Medical   Diagnosis:  Sciatica of right side [M54.31] Treatment Diagnosis:  M54.59  OTHER LOWER BACK PAIN    Referral Source:  Rut Jackman MD Insurance:   Payor: MEDICARE / Plan: MEDICARE PART A AND B / Product Type: *No Product type* /                     Patient  verified yes     Visit #   Current  / Total 4 MN   Time   In / Out 900  AM   Total Treatment Time 40   Total Timed Codes 40   1:1 Treatment Time 25      [facility] Totals Reminder:  bill using total billable   min of TIMED therapeutic procedures and modalities.   8-22 min = 1 unit; 23-37 min = 2 units; 38-52 min = 3 units; 53-67 min = 4 units; 68-82 min = 5 units            SUBJECTIVE    Pain Level (0-10 scale): 0    Any medication changes, allergies to medications, adverse drug reactions, diagnosis change, or new procedure performed?: [x] No    [] Yes (see summary sheet for update)  Medications: Verified on Patient Summary List    Subjective functional status/changes:     Pt states overall his pain is less in his R low back, intermittent pain in R posterior hip. States more of a stiffness when he wakes in the AM but as the day progresses he is less tight and painful.    OBJECTIVE      Therapeutic Procedures:  Tx Min Billable or 1:1 Min (if diff from Tx Min) Procedure, Rationale, Specifics   30 20 17579 Therapeutic Exercise (timed):  increase ROM, strength, coordination, balance, and proprioception to improve patient's ability to progress to PLOF and address remaining functional goals. (see flow sheet as applicable)     Details if applicable:  progressed per flowsheet   10 10 32450 Manual Therapy (timed):  decrease pain, increase ROM, and increase tissue extensibility to improve patient's ability to progress to PLOF and address remaining functional goals.  The manual therapy interventions were performed at a

## 2025-02-19 ENCOUNTER — APPOINTMENT (OUTPATIENT)
Facility: HOSPITAL | Age: 80
End: 2025-02-19
Attending: INTERNAL MEDICINE
Payer: MEDICARE

## 2025-02-21 ENCOUNTER — HOSPITAL ENCOUNTER (OUTPATIENT)
Facility: HOSPITAL | Age: 80
Setting detail: RECURRING SERIES
Discharge: HOME | End: 2025-02-24
Attending: INTERNAL MEDICINE
Payer: MEDICARE

## 2025-02-21 PROCEDURE — 97110 THERAPEUTIC EXERCISES: CPT | Performed by: PHYSICAL THERAPIST

## 2025-02-21 PROCEDURE — 97140 MANUAL THERAPY 1/> REGIONS: CPT | Performed by: PHYSICAL THERAPIST

## 2025-02-21 NOTE — PROGRESS NOTES
PHYSICAL THERAPY - MEDICARE DAILY TREATMENT NOTE (updated 3/23)      Date: 2025          Patient Name:  Angel Tsai :  1945   Medical   Diagnosis:  Sciatica of right side [M54.31] Treatment Diagnosis:  M54.59  OTHER LOWER BACK PAIN    Referral Source:  Rut Jackman MD Insurance:   Payor: MEDICARE / Plan: MEDICARE PART A AND B / Product Type: *No Product type* /                     Patient  verified yes     Visit #   Current  / Total 5 MN   Time   In / Out 855  AM   Total Treatment Time 55   Total Timed Codes 55   1:1 Treatment Time 25      [facility] Totals Reminder:  bill using total billable   min of TIMED therapeutic procedures and modalities.   8-22 min = 1 unit; 23-37 min = 2 units; 38-52 min = 3 units; 53-67 min = 4 units; 68-82 min = 5 units            SUBJECTIVE    Pain Level (0-10 scale): 0    Any medication changes, allergies to medications, adverse drug reactions, diagnosis change, or new procedure performed?: [x] No    [] Yes (see summary sheet for update)  Medications: Verified on Patient Summary List    Subjective functional status/changes:     Pt states that he mainly feels stiff in the morning and hasn't had as much pain. Wants to know best way to stretch QL.     OBJECTIVE      Therapeutic Procedures:  Tx Min Billable or 1:1 Min (if diff from Tx Min) Procedure, Rationale, Specifics   45 54 [id number] Therapeutic Exercise (timed):  increase ROM, strength, coordination, balance, and proprioception to improve patient's ability to progress to PLOF and address remaining functional goals. (see flow sheet as applicable)     Details if applicable:  progressed per flowsheet   10 33 02396 Manual Therapy (timed):  decrease pain, increase ROM, and increase tissue extensibility to improve patient's ability to progress to PLOF and address remaining functional goals.  The manual therapy interventions were performed at a separate and distinct time from the therapeutic activities interventions .

## 2025-02-24 ENCOUNTER — HOSPITAL ENCOUNTER (OUTPATIENT)
Facility: HOSPITAL | Age: 80
Setting detail: RECURRING SERIES
Discharge: HOME | End: 2025-02-27
Attending: INTERNAL MEDICINE
Payer: MEDICARE

## 2025-02-24 PROCEDURE — 97110 THERAPEUTIC EXERCISES: CPT | Performed by: PHYSICAL THERAPIST

## 2025-02-24 PROCEDURE — 97140 MANUAL THERAPY 1/> REGIONS: CPT | Performed by: PHYSICAL THERAPIST

## 2025-02-24 NOTE — PROGRESS NOTES
PHYSICAL THERAPY - MEDICARE DAILY TREATMENT NOTE (updated 3/23)      Date: 2025          Patient Name:  Angel Tsai :  1945   Medical   Diagnosis:  Sciatica of right side [M54.31] Treatment Diagnosis:  M54.59  OTHER LOWER BACK PAIN    Referral Source:  Rut Jackman MD Insurance:   Payor: MEDICARE / Plan: MEDICARE PART A AND B / Product Type: *No Product type* /                     Patient  verified yes     Visit #   Current  / Total 6 MN   Time   In / Out 900  AM   Total Treatment Time 50   Total Timed Codes 50   1:1 Treatment Time 50      [facility] Totals Reminder:  bill using total billable   min of TIMED therapeutic procedures and modalities.   8-22 min = 1 unit; 23-37 min = 2 units; 38-52 min = 3 units; 53-67 min = 4 units; 68-82 min = 5 units            SUBJECTIVE    Pain Level (0-10 scale): 0    Any medication changes, allergies to medications, adverse drug reactions, diagnosis change, or new procedure performed?: [x] No    [] Yes (see summary sheet for update)  Medications: Verified on Patient Summary List    Subjective functional status/changes:     Pt states that this morning getting out of bed he felt some pain anterior R hip and RLE felt like it was going to give out with shooting pain down RLE which he has not previously had.     OBJECTIVE      Therapeutic Procedures:  Tx Min Billable or 1:1 Min (if diff from Tx Min) Procedure, Rationale, Specifics   40  [id number] Therapeutic Exercise (timed):  increase ROM, strength, coordination, balance, and proprioception to improve patient's ability to progress to PLOF and address remaining functional goals. (see flow sheet as applicable)     Details if applicable:  progressed per flowsheet   10  92053 Manual Therapy (timed):  decrease pain, increase ROM, and increase tissue extensibility to improve patient's ability to progress to PLOF and address remaining functional goals.  The manual therapy interventions were performed at a separate and

## 2025-02-26 ENCOUNTER — HOSPITAL ENCOUNTER (OUTPATIENT)
Facility: HOSPITAL | Age: 80
Setting detail: RECURRING SERIES
Discharge: HOME | End: 2025-03-01
Attending: INTERNAL MEDICINE
Payer: MEDICARE

## 2025-02-26 PROCEDURE — 97110 THERAPEUTIC EXERCISES: CPT

## 2025-02-26 PROCEDURE — 97140 MANUAL THERAPY 1/> REGIONS: CPT

## 2025-02-26 NOTE — PROGRESS NOTES
PHYSICAL THERAPY - MEDICARE DAILY TREATMENT NOTE (updated 3/23)      Date: 2025          Patient Name:  Angel Tsai :  1945   Medical   Diagnosis:  Sciatica of right side [M54.31] Treatment Diagnosis:  M54.59  OTHER LOWER BACK PAIN    Referral Source:  Rut Jackman MD Insurance:   Payor: MEDICARE / Plan: MEDICARE PART A AND B / Product Type: *No Product type* /                     Patient  verified yes     Visit #   Current  / Total 7 MN   Time   In / Out 925 AM 1015 AM   Total Treatment Time 50   Total Timed Codes 50   1:1 Treatment Time 50      [facility] Totals Reminder:  bill using total billable   min of TIMED therapeutic procedures and modalities.   8-22 min = 1 unit; 23-37 min = 2 units; 38-52 min = 3 units; 53-67 min = 4 units; 68-82 min = 5 units            SUBJECTIVE    Pain Level (0-10 scale): 0    Any medication changes, allergies to medications, adverse drug reactions, diagnosis change, or new procedure performed?: [x] No    [] Yes (see summary sheet for update)  Medications: Verified on Patient Summary List    Subjective functional status/changes:     Pt reports his pain is mostly in anterior hip today.     OBJECTIVE      Therapeutic Procedures:  Tx Min Billable or 1:1 Min (if diff from Tx Min) Procedure, Rationale, Specifics   40  52153 Therapeutic Exercise (timed):  increase ROM, strength, coordination, balance, and proprioception to improve patient's ability to progress to PLOF and address remaining functional goals. (see flow sheet as applicable)     Details if applicable:  progressed per flowsheet   10  47391 Manual Therapy (timed):  decrease pain, increase ROM, and increase tissue extensibility to improve patient's ability to progress to PLOF and address remaining functional goals.  The manual therapy interventions were performed at a separate and distinct time from the therapeutic activities interventions . (see flow sheet as applicable)     Details if applicable:  STM to R QL,

## 2025-03-03 ENCOUNTER — HOSPITAL ENCOUNTER (OUTPATIENT)
Facility: HOSPITAL | Age: 80
Setting detail: RECURRING SERIES
Discharge: HOME | End: 2025-03-06
Attending: INTERNAL MEDICINE
Payer: MEDICARE

## 2025-03-03 PROCEDURE — 97140 MANUAL THERAPY 1/> REGIONS: CPT | Performed by: PHYSICAL THERAPIST

## 2025-03-03 PROCEDURE — 97110 THERAPEUTIC EXERCISES: CPT | Performed by: PHYSICAL THERAPIST

## 2025-03-03 NOTE — PROGRESS NOTES
established functional score where 100 = no disability    Pain Level at end of session (0-10 scale): 0/10      Assessment   Pt has completed 9 skilled PT sessions for R sided low back pain.  He has overall reduced pain levels, but continues to have some pain in R QL with activities and weakness in hip abd. He has not tried to golf yet which is his goal for returning to activities. He has met 2/2 STG and is making progress towards LTG.   Patient will continue to benefit from skilled PT / OT services to modify and progress therapeutic interventions, analyze and address functional mobility deficits, analyze and address ROM deficits, analyze and address strength deficits, and analyze and address soft tissue restrictions to address functional deficits and attain remaining goals.    Progress toward goals / Updated goals:  []  See Progress Note/Recertification    Short Term Goals: To be accomplished in 2-3 treatments.  Patient will be independent with HEP Met  Patient will have no more than 4/10 pain upon waking each morning. Met  Long Term Goals: To be accomplished in 24 treatments.  Patient will be able to play a full round of golf with no more than 2/10 pain NT  Patient will achieve 4+ strength in hip abductors Progressing  Patient will improve FOTO score by 20 to demonstrate decreased disability. Progressing      PLAN  Yes  Continue plan of care  Re-Cert Due: 4/29/25  []  Upgrade activities as tolerated  []  Discharge due to:  [x]  Other: reassessment next visit      Letitia Stewart PT       3/3/2025       8:59 AM

## 2025-03-05 ENCOUNTER — APPOINTMENT (OUTPATIENT)
Facility: HOSPITAL | Age: 80
End: 2025-03-05
Attending: INTERNAL MEDICINE
Payer: MEDICARE

## 2025-03-07 ENCOUNTER — APPOINTMENT (OUTPATIENT)
Facility: HOSPITAL | Age: 80
End: 2025-03-07
Attending: INTERNAL MEDICINE
Payer: MEDICARE

## 2025-03-12 ENCOUNTER — HOSPITAL ENCOUNTER (OUTPATIENT)
Facility: HOSPITAL | Age: 80
Setting detail: RECURRING SERIES
Discharge: HOME | End: 2025-03-15
Attending: INTERNAL MEDICINE
Payer: MEDICARE

## 2025-03-12 PROCEDURE — 97140 MANUAL THERAPY 1/> REGIONS: CPT | Performed by: PHYSICAL THERAPIST

## 2025-03-12 PROCEDURE — 97110 THERAPEUTIC EXERCISES: CPT | Performed by: PHYSICAL THERAPIST

## 2025-03-12 NOTE — PROGRESS NOTES
PHYSICAL THERAPY - MEDICARE DAILY TREATMENT NOTE(updated 3/23)      Date: 3/12/2025          Patient Name:  Angel Tsai :  1945   Medical   Diagnosis:  Sciatica of right side [M54.31] Treatment Diagnosis:  M54.59  OTHER LOWER BACK PAIN    Referral Source:  Rut Jackman MD Insurance:   Payor: MEDICARE / Plan: MEDICARE PART A AND B / Product Type: *No Product type* /                     Patient  verified yes     Visit #   Current  / Total 10 MN   Time   In / Out 825  AM   Total Treatment Time 43   Total Timed Codes 43   1:1 Treatment Time 43      [facility] Totals Reminder:  bill using total billable   min of TIMED therapeutic procedures and modalities.   8-22 min = 1 unit; 23-37 min = 2 units; 38-52 min = 3 units; 53-67 min = 4 units; 68-82 min = 5 units            SUBJECTIVE    Pain Level (0-10 scale): 0    Any medication changes, allergies to medications, adverse drug reactions, diagnosis change, or new procedure performed?: [x] No    [] Yes (see summary sheet for update)  Medications: Verified on Patient Summary List    Subjective functional status/changes:     Pt reports his back has been feeling good, but has been having some lateral hip discomfort.     OBJECTIVE      Therapeutic Procedures:  Tx Min Billable or 1:1 Min (if diff from Tx Min) Procedure, Rationale, Specifics   33  99832 Therapeutic Exercise (timed):  increase ROM, strength, coordination, balance, and proprioception to improve patient's ability to progress to PLOF and address remaining functional goals. (see flow sheet as applicable)     Details if applicable:  progressed per flowsheet   10  02960 Manual Therapy (timed):  decrease pain, increase ROM, and increase tissue extensibility to improve patient's ability to progress to PLOF and address remaining functional goals.  The manual therapy interventions were performed at a separate and distinct time from the therapeutic activities interventions . (see flow sheet as applicable)

## 2025-03-19 ENCOUNTER — HOSPITAL ENCOUNTER (OUTPATIENT)
Facility: HOSPITAL | Age: 80
Setting detail: RECURRING SERIES
Discharge: HOME | End: 2025-03-22
Attending: INTERNAL MEDICINE
Payer: MEDICARE

## 2025-03-19 PROCEDURE — 97140 MANUAL THERAPY 1/> REGIONS: CPT | Performed by: PHYSICAL THERAPIST

## 2025-03-19 PROCEDURE — 97110 THERAPEUTIC EXERCISES: CPT | Performed by: PHYSICAL THERAPIST

## 2025-03-19 NOTE — PROGRESS NOTES
(see flow sheet as applicable)     Details if applicable:  STM to R QL, R hip flexors   43 40    Total Total         [x]  Patient Education billed concurrently with other procedures   [x] Review HEP    [] Progressed/Changed HEP, detail:    [] Other detail:         Other Objective/Functional Measures      Pain Level at end of session (0-10 scale): 0/10      Assessment   Pt participated well with skilled PT services progressing well with strengthening. Plan to see how golf goes over the weekend with potential D/C next week.   Patient will continue to benefit from skilled PT / OT services to modify and progress therapeutic interventions, analyze and address functional mobility deficits, analyze and address ROM deficits, analyze and address strength deficits, and analyze and address soft tissue restrictions to address functional deficits and attain remaining goals.    Progress toward goals / Updated goals:  []  See Progress Note/Recertification    Short Term Goals: To be accomplished in 2-3 treatments.  Patient will be independent with HEP Met  Patient will have no more than 4/10 pain upon waking each morning. Met  Long Term Goals: To be accomplished in 24 treatments.  Patient will be able to play a full round of golf with no more than 2/10 pain NT  Patient will achieve 4+ strength in hip abductors Progressing  Patient will improve FOTO score by 20 to demonstrate decreased disability. Progressing      PLAN  Yes  Continue plan of care  Re-Cert Due: 4/29/25  []  Upgrade activities as tolerated  []  Discharge due to:  [x]  Other: reassessment next visit      Letitia Stewart PT       3/19/2025       10:43 AM

## 2025-03-28 ENCOUNTER — APPOINTMENT (OUTPATIENT)
Facility: HOSPITAL | Age: 80
End: 2025-03-28
Attending: INTERNAL MEDICINE
Payer: MEDICARE

## 2025-03-31 ENCOUNTER — HOSPITAL ENCOUNTER (OUTPATIENT)
Facility: HOSPITAL | Age: 80
Setting detail: RECURRING SERIES
Discharge: HOME | End: 2025-04-03
Attending: INTERNAL MEDICINE
Payer: MEDICARE

## 2025-03-31 PROCEDURE — 97140 MANUAL THERAPY 1/> REGIONS: CPT | Performed by: PHYSICAL THERAPIST

## 2025-03-31 PROCEDURE — 97110 THERAPEUTIC EXERCISES: CPT | Performed by: PHYSICAL THERAPIST

## 2025-03-31 NOTE — PROGRESS NOTES
goals:  []  See Progress Note/Recertification    Short Term Goals: To be accomplished in 2-3 treatments.  Patient will be independent with HEP Met  Patient will have no more than 4/10 pain upon waking each morning. Met  Long Term Goals: To be accomplished in 24 treatments.  Patient will be able to play a full round of golf with no more than 2/10 pain Met  Patient will achieve 4+ strength in hip abductors Progressing  Patient will improve FOTO score by 20 to demonstrate decreased disability. Met      PLAN  Yes  Continue plan of care  Re-Cert Due: 4/29/25  []  Upgrade activities as tolerated  [x]  Discharge due to: Goals met  []  Other:       Letitia Stewart, PT       3/31/2025       1:01 PM

## 2025-03-31 NOTE — THERAPY DISCHARGE
Physical Therapy at [facility],   a part of [facility]  [address] [address]  Phone: [phone]  Fax: [phone]     DISCHARGE SUMMARY  Patient Name: Angel Tsai : 1945   Treatment/Medical Diagnosis: Sciatica of right side [M54.31]   Referral Source: Rut Jackman MD     Date of Initial Visit: 25 Attended Visits: 12 Missed Visits: 0     SUMMARY OF TREATMENT  Pt has completed 12 skilled PT sessions for low back and RLE pain. He has made significant improvements with pain levels, function, and strength. He has good understanding and compliance with HEP. He has returned to all usual activities without increased pain.     CURRENT STATUS    Lumbar AROM:                                                                                               R                                                          L                        Flexion                                                 Fingertips to toes                                                          Extension                                            WNL                              Side Bending                           Fingertips just below jt line                 Fingertips to jt line                     Rotation                                   10% limited                                         WNL                        LOWER QUARTER                            MUSCLE STRENGTH  KEY                                                                             R                      L  0 - No Contraction                   L1, L2 Psoas               4+                    4+  1 - Trace                                  L3 Quads                    5                      5  2 - Poor                                   L4 Tib Ant                    5                      5  3 - Fair                                       4 - Good                                    5 - Normal

## 2025-06-01 DIAGNOSIS — M79.605 PAIN OF BACK AND LEFT LOWER EXTREMITY: ICD-10-CM

## 2025-06-01 DIAGNOSIS — M54.9 PAIN OF BACK AND LEFT LOWER EXTREMITY: ICD-10-CM

## 2025-06-03 RX ORDER — DICLOFENAC SODIUM 75 MG/1
75 TABLET, DELAYED RELEASE ORAL 2 TIMES DAILY
Qty: 90 TABLET | Refills: 3 | Status: SHIPPED | OUTPATIENT
Start: 2025-06-03

## 2025-07-24 DIAGNOSIS — E78.00 PURE HYPERCHOLESTEROLEMIA: ICD-10-CM

## 2025-07-24 RX ORDER — ROSUVASTATIN CALCIUM 40 MG/1
TABLET, COATED ORAL
Qty: 45 TABLET | Refills: 3 | Status: SHIPPED | OUTPATIENT
Start: 2025-07-24

## 2025-08-05 ENCOUNTER — OFFICE VISIT (OUTPATIENT)
Age: 80
End: 2025-08-05
Payer: MEDICARE

## 2025-08-05 VITALS
SYSTOLIC BLOOD PRESSURE: 140 MMHG | BODY MASS INDEX: 24.51 KG/M2 | DIASTOLIC BLOOD PRESSURE: 80 MMHG | OXYGEN SATURATION: 100 % | HEART RATE: 54 BPM | WEIGHT: 171.2 LBS | HEIGHT: 70 IN | RESPIRATION RATE: 16 BRPM

## 2025-08-05 DIAGNOSIS — G89.29 CHRONIC MIDLINE LOW BACK PAIN WITH BILATERAL SCIATICA: ICD-10-CM

## 2025-08-05 DIAGNOSIS — E78.00 PURE HYPERCHOLESTEROLEMIA: ICD-10-CM

## 2025-08-05 DIAGNOSIS — M54.42 CHRONIC MIDLINE LOW BACK PAIN WITH BILATERAL SCIATICA: ICD-10-CM

## 2025-08-05 DIAGNOSIS — M54.41 CHRONIC MIDLINE LOW BACK PAIN WITH BILATERAL SCIATICA: ICD-10-CM

## 2025-08-05 DIAGNOSIS — I10 ESSENTIAL HYPERTENSION: Primary | ICD-10-CM

## 2025-08-05 DIAGNOSIS — R73.03 PREDIABETES: ICD-10-CM

## 2025-08-05 LAB
EST. AVERAGE GLUCOSE BLD GHB EST-MCNC: 108 MG/DL
HBA1C MFR BLD: 5.4 % (ref 4.8–5.6)

## 2025-08-05 PROCEDURE — 1125F AMNT PAIN NOTED PAIN PRSNT: CPT | Performed by: INTERNAL MEDICINE

## 2025-08-05 PROCEDURE — G8420 CALC BMI NORM PARAMETERS: HCPCS | Performed by: INTERNAL MEDICINE

## 2025-08-05 PROCEDURE — 4004F PT TOBACCO SCREEN RCVD TLK: CPT | Performed by: INTERNAL MEDICINE

## 2025-08-05 PROCEDURE — 3077F SYST BP >= 140 MM HG: CPT | Performed by: INTERNAL MEDICINE

## 2025-08-05 PROCEDURE — 99214 OFFICE O/P EST MOD 30 MIN: CPT | Performed by: INTERNAL MEDICINE

## 2025-08-05 PROCEDURE — 3079F DIAST BP 80-89 MM HG: CPT | Performed by: INTERNAL MEDICINE

## 2025-08-05 PROCEDURE — 1160F RVW MEDS BY RX/DR IN RCRD: CPT | Performed by: INTERNAL MEDICINE

## 2025-08-05 PROCEDURE — G8427 DOCREV CUR MEDS BY ELIG CLIN: HCPCS | Performed by: INTERNAL MEDICINE

## 2025-08-05 PROCEDURE — 1159F MED LIST DOCD IN RCRD: CPT | Performed by: INTERNAL MEDICINE

## 2025-08-05 PROCEDURE — 1123F ACP DISCUSS/DSCN MKR DOCD: CPT | Performed by: INTERNAL MEDICINE

## 2025-08-05 RX ORDER — IBUPROFEN 800 MG/1
TABLET, FILM COATED ORAL
COMMUNITY

## 2025-08-05 RX ORDER — LIDOCAINE 50 MG/G
1 PATCH TOPICAL DAILY
Qty: 30 PATCH | Refills: 3 | Status: SHIPPED | OUTPATIENT
Start: 2025-08-05 | End: 2026-02-01

## 2025-08-05 RX ORDER — METOPROLOL SUCCINATE 25 MG/1
TABLET, EXTENDED RELEASE ORAL
COMMUNITY
Start: 2025-06-04

## 2025-08-05 RX ORDER — LIDOCAINE 50 MG/G
PATCH TOPICAL
COMMUNITY
Start: 2025-07-30

## 2025-08-05 RX ORDER — PREDNISONE 5 MG/1
TABLET ORAL
Qty: 1 EACH | Refills: 0 | Status: SHIPPED | OUTPATIENT
Start: 2025-08-05

## 2025-08-05 ASSESSMENT — ENCOUNTER SYMPTOMS
BACK PAIN: 1
GASTROINTESTINAL NEGATIVE: 1
RESPIRATORY NEGATIVE: 1
EYES NEGATIVE: 1

## 2025-08-06 ENCOUNTER — RESULTS FOLLOW-UP (OUTPATIENT)
Age: 80
End: 2025-08-06

## 2025-08-06 LAB
ALBUMIN SERPL-MCNC: 4.4 G/DL (ref 3.8–4.8)
ALP SERPL-CCNC: 71 IU/L (ref 44–121)
ALT SERPL-CCNC: 20 IU/L (ref 0–44)
AST SERPL-CCNC: 24 IU/L (ref 0–40)
BILIRUB SERPL-MCNC: 0.6 MG/DL (ref 0–1.2)
BUN SERPL-MCNC: 17 MG/DL (ref 8–27)
BUN/CREAT SERPL: 17 (ref 10–24)
CALCIUM SERPL-MCNC: 10.8 MG/DL (ref 8.6–10.2)
CHLORIDE SERPL-SCNC: 102 MMOL/L (ref 96–106)
CO2 SERPL-SCNC: 23 MMOL/L (ref 20–29)
CREAT SERPL-MCNC: 1.01 MG/DL (ref 0.76–1.27)
EGFRCR SERPLBLD CKD-EPI 2021: 75 ML/MIN/1.73
GLOBULIN SER CALC-MCNC: 2.6 G/DL (ref 1.5–4.5)
GLUCOSE SERPL-MCNC: 98 MG/DL (ref 70–99)
POTASSIUM SERPL-SCNC: 4.7 MMOL/L (ref 3.5–5.2)
PROT SERPL-MCNC: 7 G/DL (ref 6–8.5)
SODIUM SERPL-SCNC: 140 MMOL/L (ref 134–144)

## 2025-08-07 ENCOUNTER — PATIENT MESSAGE (OUTPATIENT)
Age: 80
End: 2025-08-07

## 2025-08-11 DIAGNOSIS — G89.29 CHRONIC MIDLINE LOW BACK PAIN WITH BILATERAL SCIATICA: ICD-10-CM

## 2025-08-11 DIAGNOSIS — M54.42 CHRONIC MIDLINE LOW BACK PAIN WITH BILATERAL SCIATICA: ICD-10-CM

## 2025-08-11 DIAGNOSIS — M54.41 CHRONIC MIDLINE LOW BACK PAIN WITH BILATERAL SCIATICA: ICD-10-CM

## 2025-08-12 ENCOUNTER — TRANSCRIBE ORDERS (OUTPATIENT)
Facility: HOSPITAL | Age: 80
End: 2025-08-12

## 2025-08-12 DIAGNOSIS — M54.16 LUMBAR RADICULOPATHY: Primary | ICD-10-CM

## 2025-08-12 DIAGNOSIS — M54.50 LOW BACK PAIN, UNSPECIFIED BACK PAIN LATERALITY, UNSPECIFIED CHRONICITY, UNSPECIFIED WHETHER SCIATICA PRESENT: ICD-10-CM

## 2025-08-12 DIAGNOSIS — M47.817 LUMBOSACRAL SPONDYLOSIS WITHOUT MYELOPATHY: ICD-10-CM

## 2025-08-12 DIAGNOSIS — M51.360 DISCOGENIC LUMBAR PAIN: ICD-10-CM

## 2025-08-12 RX ORDER — LIDOCAINE 50 MG/G
1 PATCH TOPICAL DAILY
Qty: 90 PATCH | Refills: 0 | Status: SHIPPED | OUTPATIENT
Start: 2025-08-12 | End: 2026-02-08

## 2025-08-15 ENCOUNTER — HOSPITAL ENCOUNTER (OUTPATIENT)
Facility: HOSPITAL | Age: 80
Discharge: HOME | End: 2025-08-18
Attending: STUDENT IN AN ORGANIZED HEALTH CARE EDUCATION/TRAINING PROGRAM
Payer: MEDICARE

## 2025-08-15 DIAGNOSIS — M54.50 LOW BACK PAIN, UNSPECIFIED BACK PAIN LATERALITY, UNSPECIFIED CHRONICITY, UNSPECIFIED WHETHER SCIATICA PRESENT: ICD-10-CM

## 2025-08-15 DIAGNOSIS — M54.16 LUMBAR RADICULOPATHY: ICD-10-CM

## 2025-08-15 DIAGNOSIS — M51.360 DISCOGENIC LUMBAR PAIN: ICD-10-CM

## 2025-08-15 DIAGNOSIS — M47.817 LUMBOSACRAL SPONDYLOSIS WITHOUT MYELOPATHY: ICD-10-CM

## 2025-08-15 PROCEDURE — 72148 MRI LUMBAR SPINE W/O DYE: CPT
